# Patient Record
Sex: MALE | Race: WHITE | Employment: OTHER | URBAN - METROPOLITAN AREA
[De-identification: names, ages, dates, MRNs, and addresses within clinical notes are randomized per-mention and may not be internally consistent; named-entity substitution may affect disease eponyms.]

---

## 2017-01-05 RX ORDER — ALFUZOSIN HYDROCHLORIDE 10 MG/1
10 TABLET, EXTENDED RELEASE ORAL DAILY
Qty: 90 TABLET | Refills: 3 | Status: SHIPPED | OUTPATIENT
Start: 2017-01-05 | End: 2019-05-21

## 2017-01-05 NOTE — TELEPHONE ENCOUNTER
[facility] requesting refill for patient for Alfuzosin ER Tablet 10mg Take 1 tablet daily. LOV: 10/12/16, LRF: 1/8/16, Future Appt: 4/12/17. Medication pended for approval. Please advise.

## 2017-03-07 RX ORDER — HYDROCHLOROTHIAZIDE 12.5 MG/1
TABLET ORAL
Qty: 90 TABLET | Refills: 1 | Status: SHIPPED | OUTPATIENT
Start: 2017-03-07 | End: 2017-11-26

## 2017-03-07 RX ORDER — SIMVASTATIN 40 MG
TABLET ORAL
Qty: 90 TABLET | Refills: 1 | Status: SHIPPED | OUTPATIENT
Start: 2017-03-07 | End: 2017-04-12

## 2017-03-07 RX ORDER — LISINOPRIL 40 MG/1
TABLET ORAL
Qty: 90 TABLET | Refills: 1 | Status: SHIPPED | OUTPATIENT
Start: 2017-03-07 | End: 2017-11-26

## 2017-03-07 RX ORDER — NIFEDIPINE 60 MG/1
TABLET, FILM COATED, EXTENDED RELEASE ORAL
Qty: 90 TABLET | Refills: 1 | Status: SHIPPED | OUTPATIENT
Start: 2017-03-07 | End: 2017-11-26

## 2017-03-07 RX ORDER — LABETALOL 100 MG/1
TABLET, FILM COATED ORAL
Qty: 360 TABLET | Refills: 1 | Status: SHIPPED | OUTPATIENT
Start: 2017-03-07 | End: 2017-04-12

## 2017-03-27 RX ORDER — FINASTERIDE 5 MG/1
TABLET, FILM COATED ORAL
Qty: 90 TABLET | Refills: 3 | Status: SHIPPED | OUTPATIENT
Start: 2017-03-27 | End: 2018-05-02

## 2017-04-12 ENCOUNTER — APPOINTMENT (OUTPATIENT)
Dept: LAB | Facility: HOSPITAL | Age: 67
End: 2017-04-12
Attending: UROLOGY
Payer: COMMERCIAL

## 2017-04-12 ENCOUNTER — OFFICE VISIT (OUTPATIENT)
Dept: SURGERY | Facility: CLINIC | Age: 67
End: 2017-04-12

## 2017-04-12 VITALS
WEIGHT: 275 LBS | HEART RATE: 68 BPM | SYSTOLIC BLOOD PRESSURE: 124 MMHG | DIASTOLIC BLOOD PRESSURE: 64 MMHG | BODY MASS INDEX: 35.29 KG/M2 | TEMPERATURE: 98 F | RESPIRATION RATE: 16 BRPM | HEIGHT: 74 IN

## 2017-04-12 DIAGNOSIS — Z12.5 SCREENING PSA (PROSTATE SPECIFIC ANTIGEN): Primary | ICD-10-CM

## 2017-04-12 DIAGNOSIS — Z12.5 SCREENING PSA (PROSTATE SPECIFIC ANTIGEN): ICD-10-CM

## 2017-04-12 PROCEDURE — 36415 COLL VENOUS BLD VENIPUNCTURE: CPT

## 2017-04-12 PROCEDURE — 99214 OFFICE O/P EST MOD 30 MIN: CPT | Performed by: UROLOGY

## 2017-04-12 PROCEDURE — 99212 OFFICE O/P EST SF 10 MIN: CPT | Performed by: UROLOGY

## 2017-04-12 NOTE — PROGRESS NOTES
[address] Patient Status:  Outpatient    1950 MRN [id number]   Location [address] [place] [address] Attending Tej Angel.  [address] Day #  PCP MD Fela Nina is a 74 yea MG Oral Tab TAKE 1 TABLET DAILY Disp: 90 tablet Rfl: 3   HYDROCHLOROTHIAZIDE 12.5 MG Oral Tab TAKE 1 TABLET DAILY Disp: 90 tablet Rfl: 1   LISINOPRIL 40 MG Oral Tab TAKE 1 TABLET DAILY Disp: 90 tablet Rfl: 1   NIFEDIPINE ER 60 MG Oral Tablet 24 Hr TAKE 1 T as well as 5 alpha reductase inhibitors to form of Proscar. Nonetheless despite maximal medical therapy patient continues to have somewhat bothersome symptoms.   Initial consultation dates January 2006 low level microscopic hematuria without gross hematuri then today but states maybe with next visit he would consider the possibility of surgery.   I answered all the patient's questions spent 30 minutes with patient and well over half time in face-to-face discussion of further evaluation and therapy          Sc

## 2017-10-19 ENCOUNTER — APPOINTMENT (OUTPATIENT)
Dept: LAB | Facility: HOSPITAL | Age: 67
End: 2017-10-19
Attending: UROLOGY
Payer: COMMERCIAL

## 2017-10-19 ENCOUNTER — OFFICE VISIT (OUTPATIENT)
Dept: SURGERY | Facility: CLINIC | Age: 67
End: 2017-10-19

## 2017-10-19 VITALS
HEIGHT: 73 IN | DIASTOLIC BLOOD PRESSURE: 76 MMHG | HEART RATE: 64 BPM | BODY MASS INDEX: 34.72 KG/M2 | WEIGHT: 262 LBS | SYSTOLIC BLOOD PRESSURE: 130 MMHG

## 2017-10-19 DIAGNOSIS — N40.1 BPH WITH OBSTRUCTION/LOWER URINARY TRACT SYMPTOMS: Primary | ICD-10-CM

## 2017-10-19 DIAGNOSIS — N13.8 BPH WITH OBSTRUCTION/LOWER URINARY TRACT SYMPTOMS: Primary | ICD-10-CM

## 2017-10-19 DIAGNOSIS — N40.1 BPH WITH OBSTRUCTION/LOWER URINARY TRACT SYMPTOMS: ICD-10-CM

## 2017-10-19 DIAGNOSIS — N13.8 BPH WITH OBSTRUCTION/LOWER URINARY TRACT SYMPTOMS: ICD-10-CM

## 2017-10-19 PROCEDURE — 99212 OFFICE O/P EST SF 10 MIN: CPT | Performed by: UROLOGY

## 2017-10-19 PROCEDURE — 99214 OFFICE O/P EST MOD 30 MIN: CPT | Performed by: UROLOGY

## 2017-10-19 PROCEDURE — 81003 URINALYSIS AUTO W/O SCOPE: CPT

## 2017-10-19 NOTE — PROGRESS NOTES
[address] Patient Status:  Outpatient    1950 MRN [id number]   Location [address] [facility] Attending Faisal Jo.  [address] Day # 0 PCP Shayne Bamberger, MD Dorothey Seller is a 74 ye prescriptions have been marked as taking for the 10/19/17 encounter (Office Visit) with Josue Rodríguez MD.    No Known Allergies   10/19/17  0812   BP: 130/76   Pulse: 64   Weight: 262 lb (118.8 kg)   Height: 6' 1\" (1.854 m)          PHYSICAL EXAM:  Aicha mildly dissatisfied with his urination however several times in the past we have discussed possibility of surgery and he has politely refuses.   Again when patient initially presented we did workup for evaluation of outlet obstruction uroflow bladder ultras

## 2017-11-20 ENCOUNTER — TELEPHONE (OUTPATIENT)
Dept: INTERNAL MEDICINE CLINIC | Facility: CLINIC | Age: 67
End: 2017-11-20

## 2017-11-20 NOTE — TELEPHONE ENCOUNTER
Pt has sent in [employer] message requesting the following orders be added to his chart. Please call back once confirmed.   Fit Colorectal Screening   Colonoscopy,10 Years   Adult Pneumonia Vaccine

## 2017-11-28 RX ORDER — LABETALOL 100 MG/1
TABLET, FILM COATED ORAL
Qty: 360 TABLET | Refills: 0 | Status: SHIPPED | OUTPATIENT
Start: 2017-11-28 | End: 2017-12-22

## 2017-11-28 RX ORDER — NIFEDIPINE 60 MG/1
TABLET, FILM COATED, EXTENDED RELEASE ORAL
Qty: 90 TABLET | Refills: 0 | Status: SHIPPED | OUTPATIENT
Start: 2017-11-28 | End: 2018-02-22

## 2017-11-28 RX ORDER — HYDROCHLOROTHIAZIDE 12.5 MG/1
TABLET ORAL
Qty: 90 TABLET | Refills: 0 | Status: SHIPPED | OUTPATIENT
Start: 2017-11-28 | End: 2018-05-02

## 2017-11-28 RX ORDER — SIMVASTATIN 40 MG
TABLET ORAL
Qty: 90 TABLET | Refills: 0 | Status: SHIPPED | OUTPATIENT
Start: 2017-11-28 | End: 2017-12-22

## 2017-11-28 RX ORDER — LISINOPRIL 40 MG/1
TABLET ORAL
Qty: 90 TABLET | Refills: 0 | Status: SHIPPED | OUTPATIENT
Start: 2017-11-28 | End: 2018-05-02

## 2017-11-28 NOTE — TELEPHONE ENCOUNTER
Hypertensive Medications  Protocol Criteria:  · Appointment scheduled in the past 6 months or in the next 3 months  · BMP or CMP in the past 12 months  · Creatinine result < 2  Recent Outpatient Visits            1 month ago BPH with obstruction/lower urin Criteria:  · Appointment scheduled in the past 12 months or in the next 3 months  · ALT & LDL on file in the past 12 months  · ALT result < 80  · LDL result <130   Recent Outpatient Visits            1 month ago BPH with obstruction/lower urinary tract sym

## 2017-12-01 ENCOUNTER — OFFICE VISIT (OUTPATIENT)
Dept: ORTHOPEDICS CLINIC | Facility: CLINIC | Age: 67
End: 2017-12-01

## 2017-12-01 ENCOUNTER — HOSPITAL ENCOUNTER (OUTPATIENT)
Dept: GENERAL RADIOLOGY | Facility: HOSPITAL | Age: 67
Discharge: HOME | End: 2017-12-01
Attending: ORTHOPAEDIC SURGERY
Payer: COMMERCIAL

## 2017-12-01 DIAGNOSIS — M25.561 ACUTE BILATERAL KNEE PAIN: Primary | ICD-10-CM

## 2017-12-01 DIAGNOSIS — M25.562 PAIN IN BOTH KNEES, UNSPECIFIED CHRONICITY: ICD-10-CM

## 2017-12-01 DIAGNOSIS — M25.562 ACUTE BILATERAL KNEE PAIN: Primary | ICD-10-CM

## 2017-12-01 DIAGNOSIS — M25.561 PAIN IN BOTH KNEES, UNSPECIFIED CHRONICITY: ICD-10-CM

## 2017-12-01 PROCEDURE — 73565 X-RAY EXAM OF KNEES: CPT | Performed by: ORTHOPAEDIC SURGERY

## 2017-12-01 PROCEDURE — 99212 OFFICE O/P EST SF 10 MIN: CPT | Performed by: ORTHOPAEDIC SURGERY

## 2017-12-01 PROCEDURE — 99213 OFFICE O/P EST LOW 20 MIN: CPT | Performed by: ORTHOPAEDIC SURGERY

## 2017-12-01 PROCEDURE — 73560 X-RAY EXAM OF KNEE 1 OR 2: CPT | Performed by: ORTHOPAEDIC SURGERY

## 2017-12-01 NOTE — PROGRESS NOTES
12/1/2017  Molinajoy Hirsch  12/21/1950  74year old   male  Jam Patel MD    HPI:   Patient presents with:  Knee Pain: Bilateral- pt states 1 month ago he twisted knee while mowing the lawn and had pain since.  pt states right knee pain started 2 mo TESTING:  Plain films of the bilateral knees were obtained and reveals adequate placement of bilateral total knee arthroplasties.     ASSESSMENT AND PLAN:   Acute bilateral knee pain  (primary encounter diagnosis)  Pain in both knees, unspecified chronicity

## 2017-12-21 NOTE — H&P
[address] [facility] [address] Gastroenterology                                                                                                  Clinic History and Physical     Pa Renal cell carcinoma ([facility] [address]     clear cell   • Unspecified essential hypertension       Past Surgical History:  No date: CHOLECYSTECTOMY  2007: COLONOSCOPY  2012: KIDNEY SURGERY      Comment: left partial nephrectomy  2005: KNEE REPLACEMENT SURGERY Left hematuria  INTEGUMENT/BREAST:  SKIN:  negative for jaundice   ALLERGIC/IMMUNOLOGIC:  negative for hay fever  ENDOCRINE:  negative for cold intolerance and heat intolerance  MUSCULOSKELETAL:  negative for joint effusion/severe erythema  BEHAVIOR/PSYCH:  neg testing, they want to proceed with colonoscopy.   #Hx Colon Polyp - uncertain PATH  #Hx Renal Cell Carcinoma   #HTN  #HLD    Recommend:  -Schedule colonoscopy w/ Dr. Stephen Baron with IV twilight sedation   -Prep: Suprep - If too expensive, alternative preparatio

## 2017-12-22 ENCOUNTER — TELEPHONE (OUTPATIENT)
Dept: GASTROENTEROLOGY | Facility: CLINIC | Age: 67
End: 2017-12-22

## 2017-12-22 ENCOUNTER — OFFICE VISIT (OUTPATIENT)
Dept: GASTROENTEROLOGY | Facility: CLINIC | Age: 67
End: 2017-12-22

## 2017-12-22 VITALS
HEART RATE: 69 BPM | BODY MASS INDEX: 34.19 KG/M2 | HEIGHT: 72.5 IN | WEIGHT: 255.19 LBS | SYSTOLIC BLOOD PRESSURE: 127 MMHG | DIASTOLIC BLOOD PRESSURE: 74 MMHG

## 2017-12-22 DIAGNOSIS — Z12.11 ENCOUNTER FOR SCREENING COLONOSCOPY: Primary | ICD-10-CM

## 2017-12-22 PROCEDURE — 99212 OFFICE O/P EST SF 10 MIN: CPT | Performed by: PHYSICIAN ASSISTANT

## 2017-12-22 PROCEDURE — 99243 OFF/OP CNSLTJ NEW/EST LOW 30: CPT | Performed by: PHYSICIAN ASSISTANT

## 2017-12-22 NOTE — TELEPHONE ENCOUNTER
Scheduled for:  Colonoscopy [id number]  Provider Name: Dr. Moustapha Tenorio  Date:  1/2/18  Location:  [facility]  Sedation:  IV sedation  Time:  7:30 am, arrival 6:30 am   Prep: Suprep  Meds/Allergies Reconciled?: Syeda Dooley PA-C reviewed  Diagnosis with codes:  Encounter for scr

## 2017-12-22 NOTE — PATIENT INSTRUCTIONS
1. Schedule colonoscopy with Dr. Nick Darnell with IV twilight sedation in January 2018. 2.  bowel prep from pharmacy - I have prescribed Suprep. This is a smaller volume preparation.  If this is too expensive, however, please call my office and we nathaly

## 2017-12-28 ENCOUNTER — OFFICE VISIT (OUTPATIENT)
Dept: INTERNAL MEDICINE CLINIC | Facility: CLINIC | Age: 67
End: 2017-12-28

## 2017-12-28 VITALS
BODY MASS INDEX: 33.88 KG/M2 | WEIGHT: 252.88 LBS | DIASTOLIC BLOOD PRESSURE: 72 MMHG | TEMPERATURE: 98 F | SYSTOLIC BLOOD PRESSURE: 118 MMHG | HEART RATE: 60 BPM | HEIGHT: 72.5 IN | RESPIRATION RATE: 20 BRPM

## 2017-12-28 DIAGNOSIS — E78.5 HYPERLIPIDEMIA, UNSPECIFIED HYPERLIPIDEMIA TYPE: ICD-10-CM

## 2017-12-28 DIAGNOSIS — N40.0 BENIGN PROSTATIC HYPERPLASIA WITHOUT LOWER URINARY TRACT SYMPTOMS: ICD-10-CM

## 2017-12-28 DIAGNOSIS — Z00.00 ROUTINE PHYSICAL EXAMINATION: Primary | ICD-10-CM

## 2017-12-28 DIAGNOSIS — I10 ESSENTIAL HYPERTENSION WITH GOAL BLOOD PRESSURE LESS THAN 140/90: ICD-10-CM

## 2017-12-28 DIAGNOSIS — B35.1 TOENAIL FUNGUS: ICD-10-CM

## 2017-12-28 PROCEDURE — 99397 PER PM REEVAL EST PAT 65+ YR: CPT | Performed by: INTERNAL MEDICINE

## 2017-12-28 NOTE — PROGRESS NOTES
HPI:    Patient ID: Hayder Lira is a 69year old male. HPI  Patient is here for physical exam and follow-up on chronic medical problems as listed below. Last seen in the office in late September 2016.   At that time he had regained a lot of the weigh Cancer Mother    • Breast Cancer Mother    • Alcohol and Other Disorders Associated Father      alcoholism      Smoking status: Never Smoker                                                              Smokeless tobacco: Never Used                      Alc PHYSICAL EXAM:   Physical Exam    Constitutional: He appears well-developed and well-nourished.    HENT:   Right Ear: Tympanic membrane and ear canal normal.   Left Ear: Tympanic membrane and ear canal normal.   Nose: Nose normal.   Mouth/Throat: [address] well controlled. Continue current medication. Patient had a question about substitutes for nifedipine as he was in the Medicare part D. This seems to be an expensive medication. Advised to look into the cost of amlodipine. Follow-up here in 6 months.

## 2017-12-30 ENCOUNTER — LAB ENCOUNTER (OUTPATIENT)
Dept: LAB | Facility: HOSPITAL | Age: 67
End: 2017-12-30
Attending: INTERNAL MEDICINE
Payer: COMMERCIAL

## 2017-12-30 DIAGNOSIS — Z00.00 ROUTINE PHYSICAL EXAMINATION: ICD-10-CM

## 2017-12-30 LAB
ALBUMIN SERPL BCP-MCNC: 4.2 G/DL (ref 3.5–4.8)
ALBUMIN/GLOB SERPL: 1.4 {RATIO} (ref 1–2)
ALP SERPL-CCNC: 71 U/L (ref 32–100)
ALT SERPL-CCNC: 17 U/L (ref 17–63)
ANION GAP SERPL CALC-SCNC: 6 MMOL/L (ref 0–18)
AST SERPL-CCNC: 20 U/L (ref 15–41)
BASOPHILS # BLD: 0 K/UL (ref 0–0.2)
BASOPHILS NFR BLD: 1 %
BILIRUB SERPL-MCNC: 0.7 MG/DL (ref 0.3–1.2)
BUN SERPL-MCNC: 19 MG/DL (ref 8–20)
BUN/CREAT SERPL: 19.2 (ref 10–20)
CALCIUM SERPL-MCNC: 9.1 MG/DL (ref 8.5–10.5)
CHLORIDE SERPL-SCNC: 107 MMOL/L (ref 95–110)
CHOLEST SERPL-MCNC: 115 MG/DL (ref 110–200)
CO2 SERPL-SCNC: 27 MMOL/L (ref 22–32)
CREAT SERPL-MCNC: 0.99 MG/DL (ref 0.5–1.5)
EOSINOPHIL # BLD: 0.2 K/UL (ref 0–0.7)
EOSINOPHIL NFR BLD: 3 %
ERYTHROCYTE [DISTWIDTH] IN BLOOD BY AUTOMATED COUNT: 14.9 % (ref 11–15)
GLOBULIN PLAS-MCNC: 3 G/DL (ref 2.5–3.7)
GLUCOSE SERPL-MCNC: 100 MG/DL (ref 70–99)
HCT VFR BLD AUTO: 40.3 % (ref 41–52)
HDLC SERPL-MCNC: 39 MG/DL
HGB BLD-MCNC: 13.2 G/DL (ref 13.5–17.5)
LDLC SERPL CALC-MCNC: 66 MG/DL (ref 0–99)
LYMPHOCYTES # BLD: 1.3 K/UL (ref 1–4)
LYMPHOCYTES NFR BLD: 22 %
MCH RBC QN AUTO: 28.5 PG (ref 27–32)
MCHC RBC AUTO-ENTMCNC: 32.8 G/DL (ref 32–37)
MCV RBC AUTO: 87.1 FL (ref 80–100)
MONOCYTES # BLD: 0.6 K/UL (ref 0–1)
MONOCYTES NFR BLD: 10 %
NEUTROPHILS # BLD AUTO: 3.8 K/UL (ref 1.8–7.7)
NEUTROPHILS NFR BLD: 64 %
NONHDLC SERPL-MCNC: 76 MG/DL
OSMOLALITY UR CALC.SUM OF ELEC: 292 MOSM/KG (ref 275–295)
PLATELET # BLD AUTO: 208 K/UL (ref 140–400)
PMV BLD AUTO: 8.1 FL (ref 7.4–10.3)
POTASSIUM SERPL-SCNC: 3.8 MMOL/L (ref 3.3–5.1)
PROT SERPL-MCNC: 7.2 G/DL (ref 5.9–8.4)
RBC # BLD AUTO: 4.63 M/UL (ref 4.5–5.9)
SODIUM SERPL-SCNC: 140 MMOL/L (ref 136–144)
TRIGL SERPL-MCNC: 51 MG/DL (ref 1–149)
TSH SERPL-ACNC: 3.31 UIU/ML (ref 0.45–5.33)
WBC # BLD AUTO: 6 K/UL (ref 4–11)

## 2017-12-30 PROCEDURE — 80053 COMPREHEN METABOLIC PANEL: CPT

## 2017-12-30 PROCEDURE — 36415 COLL VENOUS BLD VENIPUNCTURE: CPT

## 2017-12-30 PROCEDURE — 80061 LIPID PANEL: CPT

## 2017-12-30 PROCEDURE — 85025 COMPLETE CBC W/AUTO DIFF WBC: CPT

## 2017-12-30 PROCEDURE — 84443 ASSAY THYROID STIM HORMONE: CPT

## 2018-01-02 ENCOUNTER — SURGERY (OUTPATIENT)
Age: 68
End: 2018-01-02

## 2018-01-02 ENCOUNTER — HOSPITAL ENCOUNTER (OUTPATIENT)
Facility: HOSPITAL | Age: 68
Setting detail: HOSPITAL OUTPATIENT SURGERY
Discharge: HOME | End: 2018-01-02
Attending: INTERNAL MEDICINE | Admitting: INTERNAL MEDICINE
Payer: COMMERCIAL

## 2018-01-02 DIAGNOSIS — Z12.11 ENCOUNTER FOR SCREENING COLONOSCOPY: ICD-10-CM

## 2018-01-02 PROCEDURE — 0DBK8ZX EXCISION OF ASCENDING COLON, VIA NATURAL OR ARTIFICIAL OPENING ENDOSCOPIC, DIAGNOSTIC: ICD-10-PCS | Performed by: INTERNAL MEDICINE

## 2018-01-02 PROCEDURE — 0DBH8ZX EXCISION OF CECUM, VIA NATURAL OR ARTIFICIAL OPENING ENDOSCOPIC, DIAGNOSTIC: ICD-10-PCS | Performed by: INTERNAL MEDICINE

## 2018-01-02 PROCEDURE — 0DBL8ZX EXCISION OF TRANSVERSE COLON, VIA NATURAL OR ARTIFICIAL OPENING ENDOSCOPIC, DIAGNOSTIC: ICD-10-PCS | Performed by: INTERNAL MEDICINE

## 2018-01-02 PROCEDURE — 99152 MOD SED SAME PHYS/QHP 5/>YRS: CPT | Performed by: INTERNAL MEDICINE

## 2018-01-02 PROCEDURE — 45385 COLONOSCOPY W/LESION REMOVAL: CPT | Performed by: INTERNAL MEDICINE

## 2018-01-02 RX ORDER — SODIUM CHLORIDE 0.9 % (FLUSH) 0.9 %
10 SYRINGE (ML) INJECTION AS NEEDED
Status: DISCONTINUED | OUTPATIENT
Start: 2018-01-02 | End: 2018-01-02

## 2018-01-02 RX ORDER — MIDAZOLAM HYDROCHLORIDE 1 MG/ML
INJECTION INTRAMUSCULAR; INTRAVENOUS
Status: DISCONTINUED | OUTPATIENT
Start: 2018-01-02 | End: 2018-01-02

## 2018-01-02 RX ORDER — SODIUM CHLORIDE, SODIUM LACTATE, POTASSIUM CHLORIDE, CALCIUM CHLORIDE 600; 310; 30; 20 MG/100ML; MG/100ML; MG/100ML; MG/100ML
INJECTION, SOLUTION INTRAVENOUS CONTINUOUS
Status: DISCONTINUED | OUTPATIENT
Start: 2018-01-02 | End: 2018-01-02

## 2018-01-02 RX ORDER — MIDAZOLAM HYDROCHLORIDE 1 MG/ML
1 INJECTION INTRAMUSCULAR; INTRAVENOUS EVERY 5 MIN PRN
Status: DISCONTINUED | OUTPATIENT
Start: 2018-01-02 | End: 2018-01-02

## 2018-01-02 NOTE — H&P
History & Physical Examination    Patient Name: Poly Blackwell  MRN: [id number]  CSN: [id number]  YOB: 1950    Diagnosis: Screening colonoscopy examination    Present Illness:     69year old male patient of Dr. Ovi Zhao with history of HLD, mouth daily. Disp: 90 tablet Rfl: 3 Taking   Labetalol HCl 100 MG Oral Tab Take 2 tablets (200 mg total) by mouth 2 (two) times daily.  Disp: 360 tablet Rfl: 1 1/1/2018   simvastatin (ZOCOR) 40 MG Oral Tab TAKE 1 TABLET NIGHTLY Disp: 90 tablet Rfl: 1 Taking [ x ] I have discussed the risks and benefits and alternatives with the patient/family. They understand and agree to proceed with plan of care. [ x ] I have reviewed the History and Physical done within the last 30 days. Any changes noted above.

## 2018-01-02 NOTE — OPERATIVE REPORT
COLONOSCOPY PROCEDURE REPORT     DATE OF PROCEDURE:  1/2/2018     PCP: Robert Wagoner MD     PREOPERATIVE DIAGNOSIS:  Screening colonoscopy examination     POSTOPERATIVE DIAGNOSIS:  See impression. SURGEON:  AURA Tucker:

## 2018-01-03 VITALS
DIASTOLIC BLOOD PRESSURE: 57 MMHG | HEIGHT: 73 IN | OXYGEN SATURATION: 96 % | HEART RATE: 55 BPM | RESPIRATION RATE: 14 BRPM | WEIGHT: 250 LBS | SYSTOLIC BLOOD PRESSURE: 123 MMHG | BODY MASS INDEX: 33.13 KG/M2

## 2018-01-18 ENCOUNTER — TELEPHONE (OUTPATIENT)
Dept: GASTROENTEROLOGY | Facility: CLINIC | Age: 68
End: 2018-01-18

## 2018-01-18 NOTE — TELEPHONE ENCOUNTER
I mailed out colonoscopy results letter to pt  Updated health maintenance  Entered into 3 yr recall  Recall colon in 3 years per.  Colon done 1/02/2018  GI RNs - please print and mail this letter to pt; recall for colonoscopy exam in 3yrs

## 2018-02-20 ENCOUNTER — PATIENT MESSAGE (OUTPATIENT)
Dept: INTERNAL MEDICINE CLINIC | Facility: CLINIC | Age: 68
End: 2018-02-20

## 2018-02-20 NOTE — TELEPHONE ENCOUNTER
Pt called regarding this concern. States has lump or pimple to right cheek , thinks it may be a boil.  + redness and tender to touch. No purulent drainage, afebrile. Does not effect oral movement, vision, or respiratory issues.   Pt is not touching it or

## 2018-02-20 NOTE — TELEPHONE ENCOUNTER
From: Rosetta Deluca  To: Zeeshan Nieto MD  Sent: 2/20/2018 9:47 AM CST  Subject: Non-Urgent Medical Question    I have some sort of \"pimple\" or something on my cheek for about a week now. it is a light brown and hard underneath.  You don't have an marita

## 2018-02-22 ENCOUNTER — OFFICE VISIT (OUTPATIENT)
Dept: INTERNAL MEDICINE CLINIC | Facility: CLINIC | Age: 68
End: 2018-02-22

## 2018-02-22 VITALS
HEIGHT: 72.5 IN | TEMPERATURE: 98 F | HEART RATE: 62 BPM | DIASTOLIC BLOOD PRESSURE: 66 MMHG | BODY MASS INDEX: 34.03 KG/M2 | WEIGHT: 254 LBS | SYSTOLIC BLOOD PRESSURE: 108 MMHG | RESPIRATION RATE: 18 BRPM

## 2018-02-22 DIAGNOSIS — L72.9 INFECTED CYST OF SKIN: Primary | ICD-10-CM

## 2018-02-22 DIAGNOSIS — L08.9 INFECTED CYST OF SKIN: Primary | ICD-10-CM

## 2018-02-22 DIAGNOSIS — I10 ESSENTIAL HYPERTENSION WITH GOAL BLOOD PRESSURE LESS THAN 140/90: ICD-10-CM

## 2018-02-22 PROCEDURE — 99213 OFFICE O/P EST LOW 20 MIN: CPT | Performed by: INTERNAL MEDICINE

## 2018-02-22 PROCEDURE — G0463 HOSPITAL OUTPT CLINIC VISIT: HCPCS | Performed by: INTERNAL MEDICINE

## 2018-02-22 RX ORDER — AMLODIPINE BESYLATE 5 MG/1
5 TABLET ORAL DAILY
Qty: 90 TABLET | Refills: 1 | Status: SHIPPED | OUTPATIENT
Start: 2018-02-22 | End: 2018-12-10

## 2018-02-22 RX ORDER — CEFADROXIL 500 MG/1
500 CAPSULE ORAL 2 TIMES DAILY
Qty: 10 CAPSULE | Refills: 0 | Status: SHIPPED | OUTPATIENT
Start: 2018-02-22 | End: 2018-02-27

## 2018-02-22 RX ORDER — SIMVASTATIN 40 MG
TABLET ORAL
Qty: 90 TABLET | Refills: 1 | Status: SHIPPED | OUTPATIENT
Start: 2018-02-22 | End: 2018-08-03

## 2018-02-22 NOTE — PROGRESS NOTES
HPI:    Patient ID: Doyle Lambert is a 69year old male. HPI  Patient is here with concerns about bump on his right cheek of his face. There is a red bump there. Is been there for 1 week. No pain, drainage, itching. He has not treated it.   No histo was drained. It was cleaned with alcohol swab. Using a #11 scalpel a tiny nick was made. Small amount of pus and blood was obtained. Patient tolerated the procedure well. Bleeding stopped with pressure applied. Placed on [address] [place] [address] for 5 days.   Call if

## 2018-05-02 DIAGNOSIS — I10 ESSENTIAL HYPERTENSION: Primary | ICD-10-CM

## 2018-05-03 RX ORDER — LISINOPRIL 40 MG/1
40 TABLET ORAL
Qty: 90 TABLET | Refills: 0 | Status: SHIPPED
Start: 2018-05-03 | End: 2018-08-03

## 2018-05-03 RX ORDER — LABETALOL 100 MG/1
200 TABLET, FILM COATED ORAL 2 TIMES DAILY
Qty: 360 TABLET | Refills: 0 | Status: SHIPPED
Start: 2018-05-03 | End: 2018-10-08

## 2018-05-03 RX ORDER — HYDROCHLOROTHIAZIDE 12.5 MG/1
12.5 TABLET ORAL
Qty: 90 TABLET | Refills: 0 | Status: SHIPPED
Start: 2018-05-03 | End: 2018-10-03

## 2018-05-03 RX ORDER — FINASTERIDE 5 MG/1
5 TABLET, FILM COATED ORAL
Qty: 90 TABLET | Refills: 0 | Status: SHIPPED
Start: 2018-05-03 | End: 2018-08-03

## 2018-05-03 NOTE — TELEPHONE ENCOUNTER
Hypertensive Medications  Protocol Criteria:  · Appointment scheduled in the past 6 months or in the next 3 months  · BMP or CMP in the past 12 months  · Creatinine result < 2  Recent Outpatient Visits            2 months ago Infected cyst of skin    Elu

## 2018-05-03 NOTE — TELEPHONE ENCOUNTER
From: Jannet Ortiz  Sent: 5/2/2018 10:05 AM CDT  Subject: Medication Renewal Request    Jannet Ortiz would like a refill of the following medications:     Labetalol HCl 100 MG Oral Tab Oli Shaw MD]     FINASTERIDE 5 MG Oral Tab Oli Tinajeros

## 2018-05-03 NOTE — TELEPHONE ENCOUNTER
Refill Protocol Appointment Criteria  · Appointment scheduled in the past 12 months or in the next 3 months  Recent Outpatient Visits            2 months ago Infected cyst of skin    Ella Dorsey MD    Office Visit

## 2018-05-23 ENCOUNTER — PATIENT MESSAGE (OUTPATIENT)
Dept: GASTROENTEROLOGY | Facility: CLINIC | Age: 68
End: 2018-05-23

## 2018-05-23 NOTE — TELEPHONE ENCOUNTER
From: Jaime Hutton  To: Reese [place]  Sent: 5/23/2018 10:36 AM CDT  Subject: Test Results Question    Hi Velia     I had a colonoscopy with Dr Ross Mann on January 2 and I just realized I don't remember seeing any results.  I just went to [facility] and co

## 2018-05-23 NOTE — TELEPHONE ENCOUNTER
Patient contacted, verified and letter read to patient. Patient also requested copy be mailed to him. Address verified and letter mailed. Patient verbalized understanding.     Dear Mr. Marisela Gillis  Your recent colonoscopy exam at [facility]

## 2018-05-28 ENCOUNTER — APPOINTMENT (OUTPATIENT)
Dept: GENERAL RADIOLOGY | Age: 68
End: 2018-05-28
Attending: EMERGENCY MEDICINE
Payer: MEDICARE

## 2018-05-28 ENCOUNTER — HOSPITAL ENCOUNTER (OUTPATIENT)
Age: 68
Discharge: HOME | End: 2018-05-28
Attending: EMERGENCY MEDICINE
Payer: MEDICARE

## 2018-05-28 VITALS
DIASTOLIC BLOOD PRESSURE: 73 MMHG | OXYGEN SATURATION: 100 % | BODY MASS INDEX: 33.13 KG/M2 | HEIGHT: 73 IN | TEMPERATURE: 98 F | HEART RATE: 71 BPM | WEIGHT: 250 LBS | SYSTOLIC BLOOD PRESSURE: 131 MMHG | RESPIRATION RATE: 18 BRPM

## 2018-05-28 DIAGNOSIS — M25.552 ACUTE HIP PAIN, LEFT: Primary | ICD-10-CM

## 2018-05-28 DIAGNOSIS — M54.32 SCIATICA OF LEFT SIDE: ICD-10-CM

## 2018-05-28 PROCEDURE — 73552 X-RAY EXAM OF FEMUR 2/>: CPT | Performed by: EMERGENCY MEDICINE

## 2018-05-28 PROCEDURE — 99213 OFFICE O/P EST LOW 20 MIN: CPT

## 2018-05-28 PROCEDURE — 72170 X-RAY EXAM OF PELVIS: CPT | Performed by: EMERGENCY MEDICINE

## 2018-05-28 PROCEDURE — 99214 OFFICE O/P EST MOD 30 MIN: CPT

## 2018-05-28 RX ORDER — PREDNISONE 20 MG/1
40 TABLET ORAL DAILY
Qty: 10 TABLET | Refills: 0 | Status: SHIPPED | OUTPATIENT
Start: 2018-05-28 | End: 2018-06-02

## 2018-05-28 NOTE — ED PROVIDER NOTES
Patient Seen in: [address]    History   Patient presents with:  Leg Pain    Stated Complaint: left leg pain     HPI    The patient is a 17-year-old male with past history of hypertension, hyperlipidemia, renal cell carc systems reviewed and negative except as noted above.     Physical Exam   ED Triage Vitals [05/28/18 1449]  BP: 131/73  Pulse: 71  Resp: 18  Temp: 98 °F (36.7 °C)  Temp src: Oral  SpO2: 100 %  O2 Device: None (Room air)    Current:/73   Pulse 71   Temp diagnosis)  Sciatica of left side    Disposition:  Discharge  5/28/2018  3:46 pm    Follow-up:  Maurizio Carpio MD  [address]  [phone]    In 3 days  If symptoms worsen        Medications Prescribed:  Discharge Medica

## 2018-05-28 NOTE — ED INITIAL ASSESSMENT (HPI)
Patient states having pain going down left upper leg, states pain increases with laying down. Patient states he first noticed the pain to left upper leg when he was walking around today.

## 2018-06-05 ENCOUNTER — HOSPITAL ENCOUNTER (OUTPATIENT)
Dept: GENERAL RADIOLOGY | Facility: HOSPITAL | Age: 68
Discharge: HOME | End: 2018-06-05
Attending: ORTHOPAEDIC SURGERY | Admitting: ORTHOPAEDIC SURGERY
Payer: MEDICARE

## 2018-06-05 ENCOUNTER — OFFICE VISIT (OUTPATIENT)
Dept: ORTHOPEDICS CLINIC | Facility: CLINIC | Age: 68
End: 2018-06-05

## 2018-06-05 DIAGNOSIS — M51.36 DDD (DEGENERATIVE DISC DISEASE), LUMBAR: Primary | ICD-10-CM

## 2018-06-05 DIAGNOSIS — M79.605 LEFT LEG PAIN: ICD-10-CM

## 2018-06-05 PROCEDURE — 72100 X-RAY EXAM L-S SPINE 2/3 VWS: CPT | Performed by: ORTHOPAEDIC SURGERY

## 2018-06-05 PROCEDURE — 99214 OFFICE O/P EST MOD 30 MIN: CPT | Performed by: ORTHOPAEDIC SURGERY

## 2018-06-05 PROCEDURE — G0463 HOSPITAL OUTPT CLINIC VISIT: HCPCS | Performed by: ORTHOPAEDIC SURGERY

## 2018-06-05 NOTE — PROGRESS NOTES
6/5/2018  Courtney Davies  12/21/1950  69year old   male  Lalita Mike MD    HPI:   Patient presents with:  Leg Pain: left upper leg pain- pt states pain started 3 wks ago. pt denies any injury.  pt states he went to  on 5/28 and had pelvis and fem disease. ASSESSMENT AND PLAN:   Ddd (degenerative disc disease), lumbar  (primary encounter diagnosis)  Left leg pain    This is a pleasant 56-year-old male with degenerative disc disease of the lumbar spine.   Patient was told that he would benefit from

## 2018-06-06 ENCOUNTER — APPOINTMENT (OUTPATIENT)
Dept: LAB | Facility: HOSPITAL | Age: 68
End: 2018-06-06
Attending: UROLOGY
Payer: MEDICARE

## 2018-06-06 ENCOUNTER — OFFICE VISIT (OUTPATIENT)
Dept: SURGERY | Facility: CLINIC | Age: 68
End: 2018-06-06

## 2018-06-06 VITALS
SYSTOLIC BLOOD PRESSURE: 140 MMHG | TEMPERATURE: 98 F | DIASTOLIC BLOOD PRESSURE: 70 MMHG | HEIGHT: 73 IN | WEIGHT: 253 LBS | BODY MASS INDEX: 33.53 KG/M2

## 2018-06-06 DIAGNOSIS — Z12.5 SCREENING PSA (PROSTATE SPECIFIC ANTIGEN): ICD-10-CM

## 2018-06-06 DIAGNOSIS — C64.2 MALIGNANT NEOPLASM OF LEFT KIDNEY (HCC): ICD-10-CM

## 2018-06-06 DIAGNOSIS — Z12.5 SCREENING PSA (PROSTATE SPECIFIC ANTIGEN): Primary | ICD-10-CM

## 2018-06-06 PROCEDURE — 36415 COLL VENOUS BLD VENIPUNCTURE: CPT

## 2018-06-06 PROCEDURE — 99214 OFFICE O/P EST MOD 30 MIN: CPT | Performed by: UROLOGY

## 2018-06-06 PROCEDURE — G0463 HOSPITAL OUTPT CLINIC VISIT: HCPCS | Performed by: UROLOGY

## 2018-06-06 NOTE — PROGRESS NOTES
[address] Patient Status:  Outpatient    1950 MRN [id number]   Location [address] [facility] Attending Jenna Burch.  [address] Day # 0 PCP MD Linden Arteaga is a 69 ye by mouth daily. Disp: 90 tablet Rfl: 3   aspirin 81 MG Oral Tab Take 1 tablet by mouth daily. Disp: 1 tablet Rfl: 0       Labetalol HCl 100 MG Oral Tab Take 2 tablets (200 mg total) by mouth 2 (two) times daily.  Disp: 360 tablet Rfl: 0   finasteride 5 MG O renal ultrasound for screening of his history of left renal mass partial nephrectomy      ASSESSMENT AND PLAN:  Case summary: Patient is a 18-year-old white male being followed in routine six-month basis for BPH with obstruction that includes maximal medic for another ultrasound the present time. Patient will also sit for PSA blood test I do think patient's visits can now stretch out to once a year.   I answered all the patient's questions spent 30 minutes with patient and well over half time in face-to-face

## 2018-06-08 ENCOUNTER — OFFICE VISIT (OUTPATIENT)
Dept: PHYSICAL THERAPY | Facility: HOSPITAL | Age: 68
End: 2018-06-08
Attending: ORTHOPAEDIC SURGERY
Payer: MEDICARE

## 2018-06-08 DIAGNOSIS — M79.605 LEFT LEG PAIN: ICD-10-CM

## 2018-06-08 DIAGNOSIS — M51.36 DDD (DEGENERATIVE DISC DISEASE), LUMBAR: ICD-10-CM

## 2018-06-08 PROCEDURE — 97162 PT EVAL MOD COMPLEX 30 MIN: CPT

## 2018-06-08 PROCEDURE — 97110 THERAPEUTIC EXERCISES: CPT

## 2018-06-08 NOTE — PROGRESS NOTES
LUMBAR SPINE EVALUATION:   Referring Physician: Dr. Charlette Solorio DX:  DDD (degenerative disc disease), lumbar (M51.36)  Left leg pain (M79.605)   Date of Service: 6/8/2018       PATIENT SUMMARY   Dyan Painting is a 69year old y/o male who prese pain and return to prior level of function.      OBJECTIVE:   Observation/Posture: slouched sitting, right lumbar shift, right shoulder elevated        Gait: no gait impairments  Neurological Screen (Motor deficit, Sensory Deficit, Reflexes, Dural Sign): WN Neuromuscular Re-education; Therapeutic Activity; Ultrasound; Electrical Stim; Traction; Patient education: Home exercise program instruction    Education or treatment limitation: None  Rehab Potential:excellent     FOTO: 58/100  Current status G Code:  Ini

## 2018-06-11 ENCOUNTER — OFFICE VISIT (OUTPATIENT)
Dept: PHYSICAL THERAPY | Facility: HOSPITAL | Age: 68
End: 2018-06-11
Attending: ORTHOPAEDIC SURGERY
Payer: MEDICARE

## 2018-06-11 PROCEDURE — 97110 THERAPEUTIC EXERCISES: CPT

## 2018-06-11 NOTE — PROGRESS NOTES
Associated DX:  DDD (degenerative disc disease), lumbar (M51.36)  Left leg pain (M79.605)    Insurance:Medicare   Next MD visit: not scheduled  Fall Risk: standard         Precautions: n/a           Medication Changes since last visit?: No  Subjective: Razia Jimenez

## 2018-06-12 ENCOUNTER — HOSPITAL ENCOUNTER (OUTPATIENT)
Dept: ULTRASOUND IMAGING | Age: 68
Discharge: HOME | End: 2018-06-12
Attending: UROLOGY
Payer: MEDICARE

## 2018-06-12 DIAGNOSIS — C64.2 MALIGNANT NEOPLASM OF LEFT KIDNEY (HCC): ICD-10-CM

## 2018-06-12 PROCEDURE — 76775 US EXAM ABDO BACK WALL LIM: CPT | Performed by: UROLOGY

## 2018-06-13 ENCOUNTER — APPOINTMENT (OUTPATIENT)
Dept: PHYSICAL THERAPY | Facility: HOSPITAL | Age: 68
End: 2018-06-13
Attending: ORTHOPAEDIC SURGERY
Payer: MEDICARE

## 2018-06-15 ENCOUNTER — OFFICE VISIT (OUTPATIENT)
Dept: PHYSICAL THERAPY | Facility: HOSPITAL | Age: 68
End: 2018-06-15
Attending: ORTHOPAEDIC SURGERY
Payer: MEDICARE

## 2018-06-15 DIAGNOSIS — M51.36 DDD (DEGENERATIVE DISC DISEASE), LUMBAR: ICD-10-CM

## 2018-06-15 DIAGNOSIS — M79.605 LEFT LEG PAIN: ICD-10-CM

## 2018-06-15 PROCEDURE — 97110 THERAPEUTIC EXERCISES: CPT

## 2018-06-15 NOTE — PROGRESS NOTES
Associated DX:  DDD (degenerative disc disease), lumbar (M51.36)  Left leg pain (M79.605)    Insurance:Medicare   Next MD visit: not scheduled  Fall Risk: standard         Precautions: n/a           Medication Changes since last visit?: No  Subjective: Anni Gonzalez

## 2018-06-19 ENCOUNTER — OFFICE VISIT (OUTPATIENT)
Dept: PHYSICAL THERAPY | Facility: HOSPITAL | Age: 68
End: 2018-06-19
Attending: ORTHOPAEDIC SURGERY
Payer: MEDICARE

## 2018-06-19 ENCOUNTER — OFFICE VISIT (OUTPATIENT)
Dept: PODIATRY CLINIC | Facility: CLINIC | Age: 68
End: 2018-06-19

## 2018-06-19 DIAGNOSIS — M79.605 LEFT LEG PAIN: ICD-10-CM

## 2018-06-19 DIAGNOSIS — M20.41 HAMMER TOE OF RIGHT FOOT: ICD-10-CM

## 2018-06-19 DIAGNOSIS — M51.36 DDD (DEGENERATIVE DISC DISEASE), LUMBAR: ICD-10-CM

## 2018-06-19 DIAGNOSIS — B35.1 ONYCHOMYCOSIS: Primary | ICD-10-CM

## 2018-06-19 PROCEDURE — G0463 HOSPITAL OUTPT CLINIC VISIT: HCPCS | Performed by: PODIATRIST

## 2018-06-19 PROCEDURE — 99203 OFFICE O/P NEW LOW 30 MIN: CPT | Performed by: PODIATRIST

## 2018-06-19 PROCEDURE — 97110 THERAPEUTIC EXERCISES: CPT

## 2018-06-19 NOTE — PROGRESS NOTES
Associated DX:  DDD (degenerative disc disease), lumbar (M51.36)  Left leg pain (M79.605)    Insurance:Medicare   Next MD visit: not scheduled  Fall Risk: standard         Precautions: n/a           Medication Changes since last visit?: No  Subjective: \"I initiate extension exercises for lower back, pt respond well no increased pain or discomfort. Patient c/o slight left leg tired but no inc pain and prone with B knee flex pt make slide snipping noise left posterior knee and per pt it's slight discomfort.  E

## 2018-06-19 NOTE — PROGRESS NOTES
HPI:    Patient ID: Santo Collado is a 69year old male. HPI  This 58-year-old male presents as a new patient to me and states that he is referred by [facility]. He has 3 concerns. The first is in reference to nail changes on both feet.   The second i culture to assist in making the diagnosis. The cord on the fifth left toe is over the proximal interphalangeal joint. Sharply debridement demonstrates no open or draining.   The toe is semirigid in its position and therefore based on shoe wear this will b

## 2018-06-21 DIAGNOSIS — I10 ESSENTIAL HYPERTENSION: ICD-10-CM

## 2018-06-21 RX ORDER — HYDROCHLOROTHIAZIDE 12.5 MG/1
TABLET ORAL
Qty: 90 TABLET | OUTPATIENT
Start: 2018-06-21

## 2018-06-21 RX ORDER — LABETALOL 100 MG/1
TABLET, FILM COATED ORAL
Qty: 360 TABLET | OUTPATIENT
Start: 2018-06-21

## 2018-06-21 RX ORDER — LISINOPRIL 40 MG/1
TABLET ORAL
Qty: 90 TABLET | OUTPATIENT
Start: 2018-06-21

## 2018-06-21 RX ORDER — FINASTERIDE 5 MG/1
TABLET, FILM COATED ORAL
Qty: 90 TABLET | OUTPATIENT
Start: 2018-06-21

## 2018-06-22 ENCOUNTER — OFFICE VISIT (OUTPATIENT)
Dept: PHYSICAL THERAPY | Facility: HOSPITAL | Age: 68
End: 2018-06-22
Attending: ORTHOPAEDIC SURGERY
Payer: MEDICARE

## 2018-06-22 DIAGNOSIS — M51.36 DDD (DEGENERATIVE DISC DISEASE), LUMBAR: ICD-10-CM

## 2018-06-22 DIAGNOSIS — M79.605 LEFT LEG PAIN: ICD-10-CM

## 2018-06-22 PROCEDURE — 97110 THERAPEUTIC EXERCISES: CPT

## 2018-06-22 NOTE — PROGRESS NOTES
Associated DX:  DDD (degenerative disc disease), lumbar (M51.36)  Left leg pain (M79.605)    Insurance:Medicare   Next MD visit: 7/3/18  Fall Risk: standard         Precautions: n/a           Medication Changes since last visit?: No  Subjective: Patient st day          Assessment: Patient had no significant reduction of pain with side glide today so repeated flexion was assessed. Extension produced leg pain. Patient had decrease in pain with repeated flexion and patient will trial this at home. Goals:   To

## 2018-06-25 ENCOUNTER — OFFICE VISIT (OUTPATIENT)
Dept: PHYSICAL THERAPY | Facility: HOSPITAL | Age: 68
End: 2018-06-25
Attending: ORTHOPAEDIC SURGERY
Payer: MEDICARE

## 2018-06-25 DIAGNOSIS — M79.605 LEFT LEG PAIN: ICD-10-CM

## 2018-06-25 DIAGNOSIS — M51.36 DDD (DEGENERATIVE DISC DISEASE), LUMBAR: ICD-10-CM

## 2018-06-25 PROCEDURE — 97110 THERAPEUTIC EXERCISES: CPT

## 2018-06-25 NOTE — PROGRESS NOTES
Associated DX:  DDD (degenerative disc disease), lumbar (M51.36)  Left leg pain (M79.605)    Insurance:Medicare   Next MD visit: 7/3/18  Fall Risk: standard         Precautions: n/a           Medication Changes since last visit?: No  Subjective: Patient st previous however standing straight instead of flexion bias Not today Repeated flexion in seated 5 times a day Repeated flexion in seated and standing x 10 (4 times a day)          Assessment: Patient responded very well to repeated flexion and symptoms imp

## 2018-06-29 ENCOUNTER — OFFICE VISIT (OUTPATIENT)
Dept: PHYSICAL THERAPY | Facility: HOSPITAL | Age: 68
End: 2018-06-29
Attending: ORTHOPAEDIC SURGERY
Payer: MEDICARE

## 2018-06-29 DIAGNOSIS — M51.36 DDD (DEGENERATIVE DISC DISEASE), LUMBAR: ICD-10-CM

## 2018-06-29 DIAGNOSIS — M79.605 LEFT LEG PAIN: ICD-10-CM

## 2018-06-29 PROCEDURE — 97110 THERAPEUTIC EXERCISES: CPT

## 2018-07-02 ENCOUNTER — APPOINTMENT (OUTPATIENT)
Dept: PHYSICAL THERAPY | Facility: HOSPITAL | Age: 68
End: 2018-07-02
Attending: ORTHOPAEDIC SURGERY
Payer: MEDICARE

## 2018-07-03 ENCOUNTER — OFFICE VISIT (OUTPATIENT)
Dept: ORTHOPEDICS CLINIC | Facility: CLINIC | Age: 68
End: 2018-07-03

## 2018-07-03 DIAGNOSIS — M51.36 DDD (DEGENERATIVE DISC DISEASE), LUMBAR: Primary | ICD-10-CM

## 2018-07-03 PROCEDURE — G0463 HOSPITAL OUTPT CLINIC VISIT: HCPCS | Performed by: ORTHOPAEDIC SURGERY

## 2018-07-03 PROCEDURE — 99213 OFFICE O/P EST LOW 20 MIN: CPT | Performed by: ORTHOPAEDIC SURGERY

## 2018-07-03 NOTE — PROGRESS NOTES
This is a pleasant 40-year-old male with previous diagnosis of degenerative disc disease of the lumbar spine. The patient performed a course of physical therapy reports he feels much better than he did previously.   He continues to have intermittent anteri

## 2018-07-09 ENCOUNTER — OFFICE VISIT (OUTPATIENT)
Dept: PHYSICAL THERAPY | Facility: HOSPITAL | Age: 68
End: 2018-07-09
Attending: ORTHOPAEDIC SURGERY
Payer: MEDICARE

## 2018-07-09 PROCEDURE — 97110 THERAPEUTIC EXERCISES: CPT

## 2018-07-09 RX ORDER — SIMVASTATIN 40 MG
TABLET ORAL
Qty: 90 TABLET | OUTPATIENT
Start: 2018-07-09

## 2018-07-09 NOTE — PROGRESS NOTES
Discharge Summary    Associated DX:  DDD (degenerative disc disease), lumbar (M51.36)  Left leg pain (M79.605)    Insurance:Medicare   Next MD visit: 7/3/18  Fall Risk: standard         Precautions: n/a           Medication Changes since last visit?: No  S anterior thigh    RFIL 2 x 10 - slightly better    RFISeated x 10 - no pain during, better after     RFISeated  3 x 10    Inclined walking x 6 minutes 2.0mph (attended for symptom monitoring)    Standing lumbar flexion 2 x 10         Inclined walking x 6 m [phone]     Sincerely,  Electronically signed by therapist: Tobias Alejandra, PT, DPT, Cert. MDT     Physician's certification required: Yes  I certify the need for these services furnished under this plan of treatment and while under my care.     X______

## 2018-07-20 ENCOUNTER — APPOINTMENT (OUTPATIENT)
Dept: PHYSICAL THERAPY | Facility: HOSPITAL | Age: 68
End: 2018-07-20
Attending: ORTHOPAEDIC SURGERY
Payer: MEDICARE

## 2018-07-24 ENCOUNTER — OFFICE VISIT (OUTPATIENT)
Dept: PODIATRY CLINIC | Facility: CLINIC | Age: 68
End: 2018-07-24
Payer: MEDICARE

## 2018-07-24 DIAGNOSIS — B35.1 ONYCHOMYCOSIS: Primary | ICD-10-CM

## 2018-07-24 PROCEDURE — G0463 HOSPITAL OUTPT CLINIC VISIT: HCPCS | Performed by: PODIATRIST

## 2018-07-24 PROCEDURE — 99212 OFFICE O/P EST SF 10 MIN: CPT | Performed by: PODIATRIST

## 2018-07-24 RX ORDER — TERBINAFINE HYDROCHLORIDE 250 MG/1
250 TABLET ORAL DAILY
Qty: 90 TABLET | Refills: 0 | Status: SHIPPED | OUTPATIENT
Start: 2018-07-24 | End: 2019-07-03

## 2018-07-24 NOTE — PROGRESS NOTES
HPI:    Patient ID: Silvio Hernandez is a 69year old male. HPI  56-year-old male presents for follow-up and further considerations of treatment associated with fungus toenails.   After review of the culture, evaluation of the clinical nail changes and his Sig: Take 1 tablet (250 mg total) by mouth daily.            Imaging & Referrals:  None       MT#[id number]

## 2018-08-03 ENCOUNTER — PATIENT MESSAGE (OUTPATIENT)
Dept: INTERNAL MEDICINE CLINIC | Facility: CLINIC | Age: 68
End: 2018-08-03

## 2018-08-03 DIAGNOSIS — I10 ESSENTIAL HYPERTENSION: ICD-10-CM

## 2018-08-03 NOTE — TELEPHONE ENCOUNTER
From: Liz Ward  Sent: 8/3/2018 3:37 PM CDT  Subject: Medication Renewal Request    Liz Ward would like a refill of the following medications:     simvastatin 40 MG Oral Tab Shaunna Ruiz MD]     finasteride 5 MG Oral Tab Shaunna Ruiz MD

## 2018-08-04 RX ORDER — LISINOPRIL 40 MG/1
40 TABLET ORAL
Qty: 90 TABLET | Refills: 0 | Status: SHIPPED | OUTPATIENT
Start: 2018-08-04 | End: 2018-10-03

## 2018-08-04 NOTE — TELEPHONE ENCOUNTER
Please review pended refill requests as does not fall under a protocol and simvastatin shows interaction with amlodipine.     Other med(s) refilled per [facility] protocol:    Hypertensive Medications  Protocol Criteria:  · Appointment scheduled in the

## 2018-08-04 NOTE — TELEPHONE ENCOUNTER
From: Laura De Santiago  To: Saroj Perkins MD  Sent: 8/3/2018 3:44 PM CDT  Subject: Non-Urgent Medical Question    I am finally ready to start taking the Amlodipine in place of the NIfedipine.  I believe you wanted me to come in to see Bolivar paz

## 2018-08-06 RX ORDER — FINASTERIDE 5 MG/1
5 TABLET, FILM COATED ORAL
Qty: 90 TABLET | Refills: 1 | Status: SHIPPED | OUTPATIENT
Start: 2018-08-06 | End: 2018-12-10

## 2018-08-06 RX ORDER — SIMVASTATIN 40 MG
TABLET ORAL
Qty: 90 TABLET | Refills: 1 | Status: SHIPPED | OUTPATIENT
Start: 2018-08-06 | End: 2018-12-10

## 2018-09-23 DIAGNOSIS — I10 ESSENTIAL HYPERTENSION: ICD-10-CM

## 2018-09-24 RX ORDER — LISINOPRIL 40 MG/1
TABLET ORAL
Qty: 90 TABLET | Refills: 0 | OUTPATIENT
Start: 2018-09-24

## 2018-10-03 ENCOUNTER — OFFICE VISIT (OUTPATIENT)
Dept: INTERNAL MEDICINE CLINIC | Facility: CLINIC | Age: 68
End: 2018-10-03
Payer: MEDICARE

## 2018-10-03 VITALS
HEART RATE: 61 BPM | SYSTOLIC BLOOD PRESSURE: 114 MMHG | BODY MASS INDEX: 34.47 KG/M2 | HEIGHT: 73 IN | WEIGHT: 260.13 LBS | DIASTOLIC BLOOD PRESSURE: 70 MMHG

## 2018-10-03 DIAGNOSIS — Z23 NEED FOR VACCINATION: Primary | ICD-10-CM

## 2018-10-03 DIAGNOSIS — I10 ESSENTIAL HYPERTENSION: ICD-10-CM

## 2018-10-03 PROCEDURE — 90653 IIV ADJUVANT VACCINE IM: CPT | Performed by: PHYSICIAN ASSISTANT

## 2018-10-03 PROCEDURE — G0463 HOSPITAL OUTPT CLINIC VISIT: HCPCS | Performed by: PHYSICIAN ASSISTANT

## 2018-10-03 PROCEDURE — G0008 ADMIN INFLUENZA VIRUS VAC: HCPCS | Performed by: PHYSICIAN ASSISTANT

## 2018-10-03 PROCEDURE — 99213 OFFICE O/P EST LOW 20 MIN: CPT | Performed by: PHYSICIAN ASSISTANT

## 2018-10-03 RX ORDER — LISINOPRIL 40 MG/1
40 TABLET ORAL
Qty: 90 TABLET | Refills: 1 | Status: SHIPPED | OUTPATIENT
Start: 2018-10-03 | End: 2019-05-07

## 2018-10-03 RX ORDER — HYDROCHLOROTHIAZIDE 12.5 MG/1
12.5 TABLET ORAL
Qty: 90 TABLET | Refills: 1 | Status: SHIPPED | OUTPATIENT
Start: 2018-10-03 | End: 2019-02-06

## 2018-10-03 NOTE — PROGRESS NOTES
HPI:    Patient ID: Meera Couch is a 69year old male. HPI   Patient presents today for follow-up of hypertension. He was last seen in the office by PCP in February.   At that time we switched nifedipine to amlodipine due to insurance issues  That ti tablet by mouth daily. Disp: 1 tablet Rfl: 0     Allergies:No Known Allergies   PHYSICAL EXAM:   Physical Exam   Nursing note and vitals reviewed. Constitutional: He appears well-developed and well-nourished. HENT:   Head: Normocephalic and atraumatic.

## 2018-10-08 ENCOUNTER — PATIENT MESSAGE (OUTPATIENT)
Dept: INTERNAL MEDICINE CLINIC | Facility: CLINIC | Age: 68
End: 2018-10-08

## 2018-10-08 DIAGNOSIS — I10 ESSENTIAL HYPERTENSION: ICD-10-CM

## 2018-10-09 NOTE — TELEPHONE ENCOUNTER
From: Courtney Davies  To: Peyton Baez PA-C  Sent: 10/8/2018 9:39 PM CDT  Subject: Prescription Question    just noticed, I need Labetalol not lisinopril. Did I give you the wrong med to refill?  I put in a prescription request for Labetalol separately j

## 2018-10-09 NOTE — TELEPHONE ENCOUNTER
From: Shyanne Ramirez  To: Alyssa Gray PA-C  Sent: 10/8/2018 9:27 PM CDT  Subject: Prescription Question    I saw you last week and requested refills for hydrocholorothiazide and Lisinopril. I received only the hydro from my prescription service.  Want t

## 2018-10-10 RX ORDER — LABETALOL 100 MG/1
200 TABLET, FILM COATED ORAL 2 TIMES DAILY
Qty: 360 TABLET | Refills: 0 | Status: SHIPPED | OUTPATIENT
Start: 2018-10-10 | End: 2018-11-28

## 2018-10-10 NOTE — TELEPHONE ENCOUNTER
Requested Prescriptions     Signed Prescriptions Disp Refills   • Labetalol HCl 100 MG Oral Tab 360 tablet 0     Sig: Take 2 tablets (200 mg total) by mouth 2 (two) times daily.      Authorizing Provider: Dulce Webster     Ordering User: Juanito Lopez  12/30/2017    CO2 27 12/30/2017    GLOBULIN 3.0 12/30/2017    AGRATIO 1.3 09/29/2016    ANIONGAP 6 12/30/2017    OSMOCALC 292 12/30/2017

## 2018-10-22 ENCOUNTER — OFFICE VISIT (OUTPATIENT)
Dept: PODIATRY CLINIC | Facility: CLINIC | Age: 68
End: 2018-10-22
Payer: MEDICARE

## 2018-10-22 DIAGNOSIS — B35.1 ONYCHOMYCOSIS: Primary | ICD-10-CM

## 2018-10-22 PROCEDURE — 99212 OFFICE O/P EST SF 10 MIN: CPT | Performed by: PODIATRIST

## 2018-10-22 PROCEDURE — G0463 HOSPITAL OUTPT CLINIC VISIT: HCPCS | Performed by: PODIATRIST

## 2018-10-22 NOTE — PROGRESS NOTES
HPI:    Patient ID: Courtney Davies is a 69year old male. 68-year-old male presents for fungus nail evaluation. He is now completed 2 months of the oral antifungal medications.   He reports no ill effects with the use of the medication and there is a posi encounter       Imaging & Referrals:  None       #[id number]

## 2018-10-23 ENCOUNTER — TELEPHONE (OUTPATIENT)
Dept: INTERNAL MEDICINE CLINIC | Facility: CLINIC | Age: 68
End: 2018-10-23

## 2018-10-23 NOTE — TELEPHONE ENCOUNTER
Pharmacy called in with a drug interaction of medications below. Pharmacy states that it is recommended that the simvastatin not exceed 20MG if amlodipine is prescribed. Ref #[id number]     Please advise.        Current Outpatient Medications:   •  simva

## 2018-10-24 NOTE — TELEPHONE ENCOUNTER
Please contact the patient. Advised him that his pharmacy is contacted as an we have discussed the potential risks of the amlodipine with the simvastatin. Overall the risk is very small that anything would happen.   He seems to be tolerating both of the m

## 2018-10-25 NOTE — TELEPHONE ENCOUNTER
Advised patient of Dr. Cristiane Coates note and notes below. Patient verbalized understanding and will cut the simvastatin 40mg tablets in half.

## 2018-11-28 DIAGNOSIS — I10 ESSENTIAL HYPERTENSION: ICD-10-CM

## 2018-11-28 RX ORDER — LABETALOL 100 MG/1
TABLET, FILM COATED ORAL
Qty: 360 TABLET | Refills: 0 | Status: SHIPPED | OUTPATIENT
Start: 2018-11-28 | End: 2019-02-06

## 2018-11-28 NOTE — TELEPHONE ENCOUNTER
Hypertensive Medications  Protocol Criteria:  · Appointment scheduled in the past 6 months or in the next 3 months  · BMP or CMP in the past 12 months  · Creatinine result < 2  Recent Outpatient Visits            1 month ago Onychomycosis    [facility]

## 2018-12-11 RX ORDER — SIMVASTATIN 40 MG
TABLET ORAL
Qty: 90 TABLET | Refills: 1 | Status: SHIPPED | OUTPATIENT
Start: 2018-12-11 | End: 2019-05-07

## 2018-12-11 RX ORDER — AMLODIPINE BESYLATE 5 MG/1
TABLET ORAL
Qty: 90 TABLET | Refills: 1 | Status: SHIPPED | OUTPATIENT
Start: 2018-12-11 | End: 2019-05-07

## 2018-12-11 RX ORDER — FINASTERIDE 5 MG/1
TABLET, FILM COATED ORAL
Qty: 90 TABLET | Refills: 1 | Status: SHIPPED | OUTPATIENT
Start: 2018-12-11 | End: 2019-05-07

## 2018-12-11 NOTE — TELEPHONE ENCOUNTER
JSK please advise on refills and drug message below from pharmacy/epic    Plasma concentrations and pharmacologic effects of simvastatin may be increased by amlodipine.  The daily dose of simvastatin should not exceed 20 mg in patients receiving amlodipine the next 3 months  · BMP or CMP in the past 12 months  · Creatinine result < 2  Recent Outpatient Visits            1 month ago Onychomycosis    [facility], [place], [address], [place], [place]    Office Visit    2 months ago Need for vacci

## 2019-02-06 DIAGNOSIS — I10 ESSENTIAL HYPERTENSION: ICD-10-CM

## 2019-02-07 RX ORDER — HYDROCHLOROTHIAZIDE 12.5 MG/1
TABLET ORAL
Qty: 90 TABLET | Refills: 1 | Status: SHIPPED | OUTPATIENT
Start: 2019-02-07 | End: 2019-07-25

## 2019-02-07 RX ORDER — LABETALOL 100 MG/1
TABLET, FILM COATED ORAL
Qty: 360 TABLET | Refills: 1 | Status: SHIPPED | OUTPATIENT
Start: 2019-02-07 | End: 2019-07-25

## 2019-05-07 DIAGNOSIS — I10 ESSENTIAL HYPERTENSION: ICD-10-CM

## 2019-05-09 NOTE — TELEPHONE ENCOUNTER
Please review; protocol failed due to appt and labs.     Requested Prescriptions     Pending Prescriptions Disp Refills   • AMLODIPINE BESYLATE 5 MG Oral Tab [Pharmacy Med Name: AMLODIPINE  5MG  TAB] 90 tablet 1     Sig: TAKE 1 TABLET BY MOUTH  DAILY   • SI

## 2019-05-10 RX ORDER — SIMVASTATIN 40 MG
TABLET ORAL
Qty: 90 TABLET | Refills: 0 | Status: SHIPPED | OUTPATIENT
Start: 2019-05-10 | End: 2019-07-25

## 2019-05-10 RX ORDER — LISINOPRIL 40 MG/1
TABLET ORAL
Qty: 90 TABLET | Refills: 0 | Status: SHIPPED | OUTPATIENT
Start: 2019-05-10 | End: 2019-07-25

## 2019-05-10 RX ORDER — FINASTERIDE 5 MG/1
TABLET, FILM COATED ORAL
Qty: 90 TABLET | Refills: 0 | Status: SHIPPED | OUTPATIENT
Start: 2019-05-10 | End: 2019-07-25

## 2019-05-10 RX ORDER — AMLODIPINE BESYLATE 5 MG/1
TABLET ORAL
Qty: 90 TABLET | Refills: 0 | Status: SHIPPED | OUTPATIENT
Start: 2019-05-10 | End: 2019-07-25

## 2019-05-21 ENCOUNTER — OFFICE VISIT (OUTPATIENT)
Dept: SURGERY | Facility: CLINIC | Age: 69
End: 2019-05-21
Payer: MEDICARE

## 2019-05-21 VITALS
HEIGHT: 73 IN | TEMPERATURE: 98 F | WEIGHT: 260 LBS | DIASTOLIC BLOOD PRESSURE: 90 MMHG | HEART RATE: 59 BPM | RESPIRATION RATE: 16 BRPM | BODY MASS INDEX: 34.46 KG/M2 | SYSTOLIC BLOOD PRESSURE: 150 MMHG

## 2019-05-21 DIAGNOSIS — N40.1 BPH WITH OBSTRUCTION/LOWER URINARY TRACT SYMPTOMS: Primary | ICD-10-CM

## 2019-05-21 DIAGNOSIS — N13.8 BPH WITH OBSTRUCTION/LOWER URINARY TRACT SYMPTOMS: Primary | ICD-10-CM

## 2019-05-21 DIAGNOSIS — C64.2 MALIGNANT NEOPLASM OF LEFT KIDNEY (HCC): ICD-10-CM

## 2019-05-21 PROCEDURE — 99214 OFFICE O/P EST MOD 30 MIN: CPT | Performed by: UROLOGY

## 2019-05-21 PROCEDURE — G0463 HOSPITAL OUTPT CLINIC VISIT: HCPCS | Performed by: UROLOGY

## 2019-05-21 RX ORDER — ALFUZOSIN HYDROCHLORIDE 10 MG/1
10 TABLET, EXTENDED RELEASE ORAL DAILY
Qty: 30 TABLET | Refills: 0 | Status: SHIPPED | OUTPATIENT
Start: 2019-05-21 | End: 2019-07-03

## 2019-05-21 RX ORDER — ALFUZOSIN HYDROCHLORIDE 10 MG/1
10 TABLET, EXTENDED RELEASE ORAL DAILY
Qty: 90 TABLET | Refills: 3 | Status: SHIPPED | OUTPATIENT
Start: 2019-05-21 | End: 2020-03-13

## 2019-05-21 NOTE — PROGRESS NOTES
SUBJECTIVE:  Hayder Lira is a 77year old male who presents for an office visit regarding  benign prostatic hyperplasia and history of renal cancer. He states that the problem is unchanged. Symptoms include see below.   Long term patient of Dr. Catarino Diop tightness, wheezing, cough, shortness of breath,  sputum production,chest pain or pleurisy. CARDIOVASCULAR:  Negative for pain or chest discomfort, dizziness or fainting, palpitations, leg swelling, nocturia, or claudication.   GASTROINTESTINAL:  Negative unless the patient develop any signs or symptoms of metastatic or locally recurrent disease. Consider chest x-ray after his next visit, cmp. – Scheduled for office cystoscopy and consideration for surgical therapy of his BPH. –PSA after next visit.     Kayode Lopez

## 2019-07-03 ENCOUNTER — OFFICE VISIT (OUTPATIENT)
Dept: INTERNAL MEDICINE CLINIC | Facility: CLINIC | Age: 69
End: 2019-07-03
Payer: MEDICARE

## 2019-07-03 ENCOUNTER — APPOINTMENT (OUTPATIENT)
Dept: LAB | Facility: HOSPITAL | Age: 69
End: 2019-07-03
Attending: INTERNAL MEDICINE
Payer: MEDICARE

## 2019-07-03 VITALS
TEMPERATURE: 98 F | HEIGHT: 73 IN | DIASTOLIC BLOOD PRESSURE: 76 MMHG | BODY MASS INDEX: 35.67 KG/M2 | RESPIRATION RATE: 20 BRPM | HEART RATE: 62 BPM | SYSTOLIC BLOOD PRESSURE: 128 MMHG | WEIGHT: 269.13 LBS

## 2019-07-03 DIAGNOSIS — M19.90 OSTEOARTHRITIS, UNSPECIFIED OSTEOARTHRITIS TYPE, UNSPECIFIED SITE: ICD-10-CM

## 2019-07-03 DIAGNOSIS — Z23 NEED FOR VACCINATION: ICD-10-CM

## 2019-07-03 DIAGNOSIS — Z00.00 ENCOUNTER FOR ANNUAL HEALTH EXAMINATION: Primary | ICD-10-CM

## 2019-07-03 DIAGNOSIS — R10.9 ABDOMINAL PAIN, UNSPECIFIED ABDOMINAL LOCATION: ICD-10-CM

## 2019-07-03 DIAGNOSIS — I10 ESSENTIAL HYPERTENSION: ICD-10-CM

## 2019-07-03 DIAGNOSIS — E55.9 VITAMIN D DEFICIENCY: ICD-10-CM

## 2019-07-03 DIAGNOSIS — E78.5 HYPERLIPIDEMIA, UNSPECIFIED HYPERLIPIDEMIA TYPE: ICD-10-CM

## 2019-07-03 DIAGNOSIS — N40.0 BENIGN PROSTATIC HYPERPLASIA WITHOUT LOWER URINARY TRACT SYMPTOMS: ICD-10-CM

## 2019-07-03 LAB — 25(OH)D3 SERPL-MCNC: 24.6 NG/ML (ref 30–100)

## 2019-07-03 PROCEDURE — 82306 VITAMIN D 25 HYDROXY: CPT

## 2019-07-03 PROCEDURE — G0439 PPPS, SUBSEQ VISIT: HCPCS | Performed by: INTERNAL MEDICINE

## 2019-07-03 PROCEDURE — 36415 COLL VENOUS BLD VENIPUNCTURE: CPT

## 2019-07-03 NOTE — PATIENT INSTRUCTIONS
Theo De Santiago's SCREENING SCHEDULE   Tests on this list are recommended by your physician but may not be covered, or covered at this frequency, by your insurer. Please check with your insurance carrier before scheduling to verify coverage.     PREVENTATI Cancer Screening Covered up to Age 76     Colonoscopy Screen   Covered every 10 years- more often if abnormal Colonoscopy due on 01/02/2021 Update Health Maintenance if applicable    Flex Sigmoidoscopy Screen  Covered every 5 years No results found for [facility] may be covered with your prescription benefits, but Medicare does not cover unless Medically needed    Zoster (Not covered by Medicare Part B) No orders found for this or any previous visit.  This may be covered with your pharmacy  prescription benefits

## 2019-07-03 NOTE — PROGRESS NOTES
HPI:   Silvio Hernandez is a 77year old male who presents for a Medicare Subsequent Annual Wellness visit (Pt already had Initial Annual Wellness).     Patient is here for Medicare annual wellness visit and follow-up on chronic medical issues as listed bel risk: Fall/Risk Scorin    Cognitive Assessment   He had a completely normal cognitive assessment- see flowsheet entries    Functional Ability/Status   Rosita Rich has some abnormal functions as listed below:  He has no issues with dressing and bathi Readings from Last 3 Encounters:  07/03/19 : 269 lb 1.6 oz (122.1 kg)  05/21/19 : 260 lb (117.9 kg)  10/03/18 : 260 lb 1.6 oz (118 kg)     Last Cholesterol Labs:   Lab Results   Component Value Date    CHOLEST 115 12/30/2017    HDL 39 12/30/2017    LDL 66 mother; Cancer in his mother. SOCIAL HISTORY:   He  reports that he has never smoked. He has never used smokeless tobacco. He reports that he drinks alcohol. He reports that he does not use drugs.      REVIEW OF SYSTEMS:   Review of Systems   Constitution misunderstand some words in a sentence and need to ask people to repeat themselves:  No   I especially have trouble understanding the speech of women and children:  No I have trouble understanding the speaker in a large room such as at a meeting or place o Right: No inguinal adenopathy present. Left: No inguinal adenopathy present. Neurological: He is alert. No cranial nerve deficit. Coordination normal.   Skin: Skin is warm and dry. No rash noted.    Left foot with skin growth/tag   Psychiatric: He February. May be related to season and lack of sunshine. Recheck at this time. - VITAMIN D, 25-HYDROXY; Future    8. Abdominal pain, unspecified abdominal location  Vague upper abdominal pain usually occurring overnight.   Exam is normal.  Up-to-date on 10 years No results found for this or any previous visit. No flowsheet data found. Fecal Occult Blood Annually No results found for: FOB No flowsheet data found.     Glaucoma Screening      Ophthalmology Visit Annually: Diabetics, FHx Glaucoma, AA>50, H Serum Conc  Annually No results found for: DIGOXIN, DIG, VALP No flowsheet data found.

## 2019-07-08 ENCOUNTER — OFFICE VISIT (OUTPATIENT)
Dept: SURGERY | Facility: CLINIC | Age: 69
End: 2019-07-08
Payer: MEDICARE

## 2019-07-08 VITALS
RESPIRATION RATE: 18 BRPM | DIASTOLIC BLOOD PRESSURE: 95 MMHG | WEIGHT: 265 LBS | HEART RATE: 67 BPM | SYSTOLIC BLOOD PRESSURE: 163 MMHG | BODY MASS INDEX: 35.12 KG/M2 | TEMPERATURE: 98 F | HEIGHT: 73 IN

## 2019-07-08 DIAGNOSIS — N40.1 BPH WITH OBSTRUCTION/LOWER URINARY TRACT SYMPTOMS: Primary | ICD-10-CM

## 2019-07-08 DIAGNOSIS — C64.2 MALIGNANT NEOPLASM OF LEFT KIDNEY (HCC): ICD-10-CM

## 2019-07-08 DIAGNOSIS — N13.8 BPH WITH OBSTRUCTION/LOWER URINARY TRACT SYMPTOMS: Primary | ICD-10-CM

## 2019-07-08 PROCEDURE — G0463 HOSPITAL OUTPT CLINIC VISIT: HCPCS | Performed by: UROLOGY

## 2019-07-08 PROCEDURE — 99213 OFFICE O/P EST LOW 20 MIN: CPT | Performed by: UROLOGY

## 2019-07-08 PROCEDURE — 52000 CYSTOURETHROSCOPY: CPT | Performed by: UROLOGY

## 2019-07-08 RX ORDER — CIPROFLOXACIN 500 MG/1
500 TABLET, FILM COATED ORAL ONCE
Status: COMPLETED | OUTPATIENT
Start: 2019-07-08 | End: 2019-07-08

## 2019-07-08 RX ADMIN — CIPROFLOXACIN 500 MG: 500 TABLET, FILM COATED ORAL at 08:01:00

## 2019-07-08 NOTE — PROGRESS NOTES
Alina Hirsch is a 77year old male. HPI:   Patient presents with:  Urinary Symptoms (urologic): patient presents for office cystoscopy.      64-year-old male with BPH, history of right renal cell cancer Status postleft partial nephrectomy at [facility] in TAKE 1 TABLET BY MOUTH  DAILY Disp: 90 tablet Rfl: 0   SIMVASTATIN 40 MG Oral Tab TAKE 1 TABLET BY MOUTH  NIGHTLY Disp: 90 tablet Rfl: 0   FINASTERIDE 5 MG Oral Tab TAKE 1 TABLET BY MOUTH ONCE DAILY Disp: 90 tablet Rfl: 0   HYDROCHLOROTHIAZIDE 12.5 MG Oral Mahendra Godinez MD

## 2019-07-25 DIAGNOSIS — I10 ESSENTIAL HYPERTENSION: ICD-10-CM

## 2019-07-25 NOTE — TELEPHONE ENCOUNTER
Please review; protocol failed.     Requested Prescriptions     Pending Prescriptions Disp Refills   • LISINOPRIL 40 MG Oral Tab [Pharmacy Med Name: LISINOPRIL  40MG  TAB] 90 tablet 0     Sig: TAKE 1 TABLET BY MOUTH ONCE DAILY   • HYDROCHLOROTHIAZIDE 12.5 M

## 2019-07-26 RX ORDER — LABETALOL 100 MG/1
TABLET, FILM COATED ORAL
Qty: 360 TABLET | Refills: 1 | Status: SHIPPED | OUTPATIENT
Start: 2019-07-26 | End: 2019-12-19

## 2019-07-26 RX ORDER — LISINOPRIL 40 MG/1
TABLET ORAL
Qty: 90 TABLET | Refills: 1 | Status: SHIPPED | OUTPATIENT
Start: 2019-07-26 | End: 2019-12-19

## 2019-07-26 RX ORDER — SIMVASTATIN 40 MG
TABLET ORAL
Qty: 90 TABLET | Refills: 1 | Status: SHIPPED | OUTPATIENT
Start: 2019-07-26 | End: 2019-12-19

## 2019-07-26 RX ORDER — HYDROCHLOROTHIAZIDE 12.5 MG/1
TABLET ORAL
Qty: 90 TABLET | Refills: 1 | Status: SHIPPED | OUTPATIENT
Start: 2019-07-26 | End: 2019-12-19

## 2019-07-26 RX ORDER — AMLODIPINE BESYLATE 5 MG/1
TABLET ORAL
Qty: 90 TABLET | Refills: 1 | Status: SHIPPED | OUTPATIENT
Start: 2019-07-26 | End: 2019-12-19

## 2019-07-26 RX ORDER — FINASTERIDE 5 MG/1
TABLET, FILM COATED ORAL
Qty: 90 TABLET | Refills: 1 | Status: SHIPPED | OUTPATIENT
Start: 2019-07-26 | End: 2019-12-19

## 2019-10-19 ENCOUNTER — NURSE ONLY (OUTPATIENT)
Dept: INTERNAL MEDICINE CLINIC | Facility: CLINIC | Age: 69
End: 2019-10-19
Payer: MEDICARE

## 2019-10-19 VITALS — TEMPERATURE: 98 F

## 2019-10-19 DIAGNOSIS — Z23 NEED FOR INFLUENZA VACCINATION: Primary | ICD-10-CM

## 2019-10-19 DIAGNOSIS — Z23 NEED FOR PNEUMOCOCCAL VACCINATION: ICD-10-CM

## 2019-10-19 PROCEDURE — G0008 ADMIN INFLUENZA VIRUS VAC: HCPCS | Performed by: INTERNAL MEDICINE

## 2019-10-19 PROCEDURE — 90662 IIV NO PRSV INCREASED AG IM: CPT | Performed by: INTERNAL MEDICINE

## 2019-10-19 PROCEDURE — 99211 OFF/OP EST MAY X REQ PHY/QHP: CPT | Performed by: INTERNAL MEDICINE

## 2019-10-19 PROCEDURE — 90732 PPSV23 VACC 2 YRS+ SUBQ/IM: CPT | Performed by: INTERNAL MEDICINE

## 2019-10-19 PROCEDURE — G0009 ADMIN PNEUMOCOCCAL VACCINE: HCPCS | Performed by: INTERNAL MEDICINE

## 2019-10-19 PROCEDURE — G0463 HOSPITAL OUTPT CLINIC VISIT: HCPCS | Performed by: INTERNAL MEDICINE

## 2019-10-19 NOTE — PROGRESS NOTES
Pt presents for flu and [id number] vaccination. Verbal order confirmed with NICHOLE. Fluzone administered to right deltoid and [id number] administered to left deltoid. Pt tolerated both injections well. No adverse reactions noted.

## 2019-12-19 DIAGNOSIS — I10 ESSENTIAL HYPERTENSION: ICD-10-CM

## 2019-12-21 NOTE — TELEPHONE ENCOUNTER
Please review; protocol failed due to labs. Please advise.     Requested Prescriptions     Pending Prescriptions Disp Refills   • FINASTERIDE 5 MG Oral Tab [Pharmacy Med Name: FINASTERIDE 5MG TABLET] 90 tablet 1     Sig: TAKE 1 TABLET BY MOUTH ONCE DAILY

## 2019-12-22 RX ORDER — LABETALOL 100 MG/1
TABLET, FILM COATED ORAL
Qty: 360 TABLET | Refills: 1 | Status: SHIPPED | OUTPATIENT
Start: 2019-12-22 | End: 2020-05-29

## 2019-12-22 RX ORDER — AMLODIPINE BESYLATE 5 MG/1
TABLET ORAL
Qty: 90 TABLET | Refills: 1 | Status: SHIPPED | OUTPATIENT
Start: 2019-12-22 | End: 2020-05-29

## 2019-12-22 RX ORDER — SIMVASTATIN 40 MG
TABLET ORAL
Qty: 90 TABLET | Refills: 1 | Status: SHIPPED | OUTPATIENT
Start: 2019-12-22 | End: 2021-12-14

## 2019-12-22 RX ORDER — HYDROCHLOROTHIAZIDE 12.5 MG/1
TABLET ORAL
Qty: 90 TABLET | Refills: 1 | Status: SHIPPED | OUTPATIENT
Start: 2019-12-22 | End: 2020-05-29

## 2019-12-22 RX ORDER — FINASTERIDE 5 MG/1
TABLET, FILM COATED ORAL
Qty: 90 TABLET | Refills: 1 | Status: SHIPPED | OUTPATIENT
Start: 2019-12-22 | End: 2020-09-22

## 2019-12-22 RX ORDER — LISINOPRIL 40 MG/1
TABLET ORAL
Qty: 90 TABLET | Refills: 1 | Status: SHIPPED | OUTPATIENT
Start: 2019-12-22 | End: 2020-05-29

## 2020-01-08 ENCOUNTER — OFFICE VISIT (OUTPATIENT)
Dept: INTERNAL MEDICINE CLINIC | Facility: CLINIC | Age: 70
End: 2020-01-08
Payer: MEDICARE

## 2020-01-08 VITALS
HEIGHT: 73 IN | RESPIRATION RATE: 20 BRPM | TEMPERATURE: 98 F | DIASTOLIC BLOOD PRESSURE: 82 MMHG | WEIGHT: 266 LBS | BODY MASS INDEX: 35.25 KG/M2 | HEART RATE: 58 BPM | SYSTOLIC BLOOD PRESSURE: 136 MMHG

## 2020-01-08 DIAGNOSIS — E78.5 HYPERLIPIDEMIA, UNSPECIFIED HYPERLIPIDEMIA TYPE: ICD-10-CM

## 2020-01-08 DIAGNOSIS — Z12.5 SCREENING FOR PROSTATE CANCER: ICD-10-CM

## 2020-01-08 DIAGNOSIS — N40.0 BENIGN PROSTATIC HYPERPLASIA WITHOUT LOWER URINARY TRACT SYMPTOMS: ICD-10-CM

## 2020-01-08 DIAGNOSIS — M19.90 OSTEOARTHRITIS, UNSPECIFIED OSTEOARTHRITIS TYPE, UNSPECIFIED SITE: ICD-10-CM

## 2020-01-08 DIAGNOSIS — I10 ESSENTIAL HYPERTENSION: Primary | ICD-10-CM

## 2020-01-08 DIAGNOSIS — E55.9 VITAMIN D DEFICIENCY: ICD-10-CM

## 2020-01-08 PROCEDURE — 99214 OFFICE O/P EST MOD 30 MIN: CPT | Performed by: INTERNAL MEDICINE

## 2020-01-08 PROCEDURE — G0463 HOSPITAL OUTPT CLINIC VISIT: HCPCS | Performed by: INTERNAL MEDICINE

## 2020-01-08 NOTE — PROGRESS NOTES
HPI:    Patient ID: Jose Liao is a 68year old male. HPI  Patient is here for follow-up on chronic medical issues as listed below. Last seen here about 6 months ago for annual wellness visit. Had some vague abdominal pain at that time.   Advised t 2005    Right TKR 10/2015      Family History   Problem Relation Age of Onset   • Cancer Mother    • Breast Cancer Mother    • Alcohol and Other Disorders Associated Father         alcoholism      Social History    Tobacco Use      Smoking status: Never Sm °F (36.4 °C) (Oral)   Resp 20   Ht 6' 1\" (1.854 m)   Wt 266 lb (120.7 kg)   BMI 35.09 kg/m²      Physical Exam    Constitutional: He appears well-developed and well-nourished.    HENT:   Nose: Nose normal.   Mouth/Throat: Oropharynx is clear and moist.   E sometime later in the year. 5. Osteoarthritis, unspecified osteoarthritis type, unspecified site  Status post knee replacements on both knees. He has some asymmetric bulging on the right knee but it is not symptomatic.   No need for further treatment ri

## 2020-01-21 ENCOUNTER — OFFICE VISIT (OUTPATIENT)
Dept: SURGERY | Facility: CLINIC | Age: 70
End: 2020-01-21
Payer: MEDICARE

## 2020-01-21 VITALS
HEIGHT: 73 IN | TEMPERATURE: 98 F | WEIGHT: 262 LBS | OXYGEN SATURATION: 95 % | BODY MASS INDEX: 34.72 KG/M2 | HEART RATE: 67 BPM | SYSTOLIC BLOOD PRESSURE: 152 MMHG | DIASTOLIC BLOOD PRESSURE: 88 MMHG

## 2020-01-21 DIAGNOSIS — N40.1 BPH WITH OBSTRUCTION/LOWER URINARY TRACT SYMPTOMS: Primary | ICD-10-CM

## 2020-01-21 DIAGNOSIS — C64.2 MALIGNANT NEOPLASM OF LEFT KIDNEY (HCC): ICD-10-CM

## 2020-01-21 DIAGNOSIS — R39.9 SYMPTOM INVOLVING BLADDER: ICD-10-CM

## 2020-01-21 DIAGNOSIS — Z01.818 PREOP EXAMINATION: ICD-10-CM

## 2020-01-21 DIAGNOSIS — N13.8 BPH WITH OBSTRUCTION/LOWER URINARY TRACT SYMPTOMS: Primary | ICD-10-CM

## 2020-01-21 PROCEDURE — 99214 OFFICE O/P EST MOD 30 MIN: CPT | Performed by: UROLOGY

## 2020-01-21 PROCEDURE — G0463 HOSPITAL OUTPT CLINIC VISIT: HCPCS | Performed by: UROLOGY

## 2020-01-21 NOTE — PROGRESS NOTES
Patient seen in office, scheduled for Cystoscopy, Green light Laser Transuretheral Resection of the prostate, Wednesday 02/26/2020, [facility]/outpatient, went over pre-op/labs with patient verbalized understanding.

## 2020-01-21 NOTE — PROGRESS NOTES
Sena Driver is a 68year old male. HPI:   Patient presents with:  BPH      43-year-old male presents in follow-up to visit July 8, 2019 with BPH lower urinary tract symptoms refractory to treatment with alfuzocin and finasteride chronically.   Has a p Medications (Active prior to today's visit):  Current Outpatient Medications   Medication Sig Dispense Refill   • FINASTERIDE 5 MG Oral Tab TAKE 1 TABLET BY MOUTH ONCE DAILY 90 tablet 1   • HYDROCHLOROTHIAZIDE 12.5 MG Oral Tab TAKE 1 TABLET BY MOUTH ON

## 2020-01-29 ENCOUNTER — HOSPITAL ENCOUNTER (OUTPATIENT)
Dept: GENERAL RADIOLOGY | Facility: HOSPITAL | Age: 70
Discharge: HOME | End: 2020-01-29
Attending: ORTHOPAEDIC SURGERY
Payer: MEDICARE

## 2020-01-29 ENCOUNTER — OFFICE VISIT (OUTPATIENT)
Dept: ORTHOPEDICS CLINIC | Facility: CLINIC | Age: 70
End: 2020-01-29
Payer: MEDICARE

## 2020-01-29 VITALS — HEIGHT: 73 IN | BODY MASS INDEX: 34.72 KG/M2 | WEIGHT: 262 LBS

## 2020-01-29 DIAGNOSIS — M25.562 LEFT KNEE PAIN, UNSPECIFIED CHRONICITY: Primary | ICD-10-CM

## 2020-01-29 DIAGNOSIS — M25.562 LEFT KNEE PAIN, UNSPECIFIED CHRONICITY: ICD-10-CM

## 2020-01-29 PROCEDURE — G0463 HOSPITAL OUTPT CLINIC VISIT: HCPCS | Performed by: ORTHOPAEDIC SURGERY

## 2020-01-29 PROCEDURE — 99213 OFFICE O/P EST LOW 20 MIN: CPT | Performed by: ORTHOPAEDIC SURGERY

## 2020-01-29 PROCEDURE — 73564 X-RAY EXAM KNEE 4 OR MORE: CPT | Performed by: ORTHOPAEDIC SURGERY

## 2020-01-29 NOTE — PROGRESS NOTES
NURSING INTAKE COMMENTS: Patient presents with:  Consult: Marion 3 weeks ago his left knee started to hurt only when going up the stairs. At todays visit the sharp pain has improved. Recalls no specific injuires that caused the pain.   Stts he had a left kn BY MOUTH  DAILY 90 tablet 1   • Alfuzosin HCl ER 10 MG Oral Tablet 24 Hr Take 1 tablet (10 mg total) by mouth daily.  90 tablet 3     No Known Allergies  Family History   Problem Relation Age of Onset   • Cancer Mother    • Breast Cancer Mother    • Alcohol to be a well aligned well fixed total knee arthroplasty.   There may be a mild lucency in the femoral component    Lab Results   Component Value Date    WBC 6.0 12/30/2017    HGB 13.2 (L) 12/30/2017     12/30/2017      Lab Results   Component Value D

## 2020-02-15 ENCOUNTER — HOSPITAL ENCOUNTER (OUTPATIENT)
Dept: GENERAL RADIOLOGY | Facility: HOSPITAL | Age: 70
Discharge: HOME | End: 2020-02-15
Attending: UROLOGY
Payer: MEDICARE

## 2020-02-15 ENCOUNTER — APPOINTMENT (OUTPATIENT)
Dept: LAB | Facility: HOSPITAL | Age: 70
End: 2020-02-15
Attending: UROLOGY
Payer: MEDICARE

## 2020-02-15 ENCOUNTER — LAB ENCOUNTER (OUTPATIENT)
Dept: LAB | Facility: HOSPITAL | Age: 70
End: 2020-02-15
Attending: UROLOGY
Payer: MEDICARE

## 2020-02-15 DIAGNOSIS — I10 ESSENTIAL HYPERTENSION: ICD-10-CM

## 2020-02-15 DIAGNOSIS — E55.9 VITAMIN D DEFICIENCY: ICD-10-CM

## 2020-02-15 DIAGNOSIS — Z01.818 PREOP EXAMINATION: ICD-10-CM

## 2020-02-15 DIAGNOSIS — Z12.5 SCREENING FOR PROSTATE CANCER: ICD-10-CM

## 2020-02-15 DIAGNOSIS — R39.9 SYMPTOM INVOLVING BLADDER: ICD-10-CM

## 2020-02-15 LAB
ALBUMIN SERPL-MCNC: 4 G/DL (ref 3.4–5)
ALBUMIN/GLOB SERPL: 1.1 {RATIO} (ref 1–2)
ALP LIVER SERPL-CCNC: 71 U/L (ref 45–117)
ALT SERPL-CCNC: 25 U/L (ref 16–61)
ANION GAP SERPL CALC-SCNC: 6 MMOL/L (ref 0–18)
AST SERPL-CCNC: 26 U/L (ref 15–37)
BASOPHILS # BLD AUTO: 0.02 X10(3) UL (ref 0–0.2)
BASOPHILS NFR BLD AUTO: 0.3 %
BILIRUB SERPL-MCNC: 0.8 MG/DL (ref 0.1–2)
BILIRUB UR QL: NEGATIVE
BUN BLD-MCNC: 18 MG/DL (ref 7–18)
BUN/CREAT SERPL: 18 (ref 10–20)
CALCIUM BLD-MCNC: 9 MG/DL (ref 8.5–10.1)
CHLORIDE SERPL-SCNC: 110 MMOL/L (ref 98–112)
CHOLEST SMN-MCNC: 122 MG/DL (ref ?–200)
CO2 SERPL-SCNC: 28 MMOL/L (ref 21–32)
COLOR UR: YELLOW
COMPLEXED PSA SERPL-MCNC: 0.35 NG/ML (ref ?–4)
CREAT BLD-MCNC: 1 MG/DL (ref 0.7–1.3)
DEPRECATED RDW RBC AUTO: 45 FL (ref 35.1–46.3)
EOSINOPHIL # BLD AUTO: 0.22 X10(3) UL (ref 0–0.7)
EOSINOPHIL NFR BLD AUTO: 3.7 %
ERYTHROCYTE [DISTWIDTH] IN BLOOD BY AUTOMATED COUNT: 14.2 % (ref 11–15)
GLOBULIN PLAS-MCNC: 3.5 G/DL (ref 2.8–4.4)
GLUCOSE BLD-MCNC: 95 MG/DL (ref 70–99)
GLUCOSE UR-MCNC: NEGATIVE MG/DL
HCT VFR BLD AUTO: 42.9 % (ref 39–53)
HDLC SERPL-MCNC: 39 MG/DL (ref 40–59)
HGB BLD-MCNC: 14 G/DL (ref 13–17.5)
HGB UR QL STRIP.AUTO: NEGATIVE
IMM GRANULOCYTES # BLD AUTO: 0.02 X10(3) UL (ref 0–1)
IMM GRANULOCYTES NFR BLD: 0.3 %
KETONES UR-MCNC: NEGATIVE MG/DL
LDLC SERPL CALC-MCNC: 67 MG/DL (ref ?–100)
LEUKOCYTE ESTERASE UR QL STRIP.AUTO: NEGATIVE
LYMPHOCYTES # BLD AUTO: 1.47 X10(3) UL (ref 1–4)
LYMPHOCYTES NFR BLD AUTO: 24.9 %
M PROTEIN MFR SERPL ELPH: 7.5 G/DL (ref 6.4–8.2)
MCH RBC QN AUTO: 28.3 PG (ref 26–34)
MCHC RBC AUTO-ENTMCNC: 32.6 G/DL (ref 31–37)
MCV RBC AUTO: 86.7 FL (ref 80–100)
MONOCYTES # BLD AUTO: 0.64 X10(3) UL (ref 0.1–1)
MONOCYTES NFR BLD AUTO: 10.8 %
NEUTROPHILS # BLD AUTO: 3.53 X10 (3) UL (ref 1.5–7.7)
NEUTROPHILS # BLD AUTO: 3.53 X10(3) UL (ref 1.5–7.7)
NEUTROPHILS NFR BLD AUTO: 60 %
NITRITE UR QL STRIP.AUTO: NEGATIVE
NONHDLC SERPL-MCNC: 83 MG/DL (ref ?–130)
OSMOLALITY SERPL CALC.SUM OF ELEC: 300 MOSM/KG (ref 275–295)
PATIENT FASTING Y/N/NP: YES
PATIENT FASTING Y/N/NP: YES
PH UR: 7 [PH] (ref 5–8)
PLATELET # BLD AUTO: 193 10(3)UL (ref 150–450)
POTASSIUM SERPL-SCNC: 3.7 MMOL/L (ref 3.5–5.1)
PROT UR-MCNC: NEGATIVE MG/DL
RBC # BLD AUTO: 4.95 X10(6)UL (ref 3.8–5.8)
SODIUM SERPL-SCNC: 144 MMOL/L (ref 136–145)
SP GR UR STRIP: 1.02 (ref 1–1.03)
TRIGL SERPL-MCNC: 79 MG/DL (ref 30–149)
TSI SER-ACNC: 2.76 MIU/ML (ref 0.36–3.74)
UROBILINOGEN UR STRIP-ACNC: <2
VIT B12 SERPL-MCNC: 320 PG/ML (ref 193–986)
VLDLC SERPL CALC-MCNC: 16 MG/DL (ref 0–30)
WBC # BLD AUTO: 5.9 X10(3) UL (ref 4–11)

## 2020-02-15 PROCEDURE — 82306 VITAMIN D 25 HYDROXY: CPT

## 2020-02-15 PROCEDURE — 93010 ELECTROCARDIOGRAM REPORT: CPT | Performed by: UROLOGY

## 2020-02-15 PROCEDURE — 87086 URINE CULTURE/COLONY COUNT: CPT

## 2020-02-15 PROCEDURE — 82607 VITAMIN B-12: CPT

## 2020-02-15 PROCEDURE — 36415 COLL VENOUS BLD VENIPUNCTURE: CPT

## 2020-02-15 PROCEDURE — 85025 COMPLETE CBC W/AUTO DIFF WBC: CPT

## 2020-02-15 PROCEDURE — 80061 LIPID PANEL: CPT

## 2020-02-15 PROCEDURE — 71046 X-RAY EXAM CHEST 2 VIEWS: CPT | Performed by: UROLOGY

## 2020-02-15 PROCEDURE — 93005 ELECTROCARDIOGRAM TRACING: CPT

## 2020-02-15 PROCEDURE — 80053 COMPREHEN METABOLIC PANEL: CPT

## 2020-02-15 PROCEDURE — 81003 URINALYSIS AUTO W/O SCOPE: CPT

## 2020-02-15 PROCEDURE — 84443 ASSAY THYROID STIM HORMONE: CPT

## 2020-02-17 LAB — 25(OH)D3 SERPL-MCNC: 29.7 NG/ML (ref 30–100)

## 2020-02-19 RX ORDER — FAMOTIDINE 20 MG/1
20 TABLET ORAL ONCE
Status: DISCONTINUED | OUTPATIENT
Start: 2020-02-19 | End: 2020-02-19

## 2020-02-19 RX ORDER — SODIUM CHLORIDE, SODIUM LACTATE, POTASSIUM CHLORIDE, CALCIUM CHLORIDE 600; 310; 30; 20 MG/100ML; MG/100ML; MG/100ML; MG/100ML
INJECTION, SOLUTION INTRAVENOUS CONTINUOUS
Status: DISCONTINUED | OUTPATIENT
Start: 2020-02-19 | End: 2020-02-19

## 2020-02-19 RX ORDER — ACETAMINOPHEN 500 MG
1000 TABLET ORAL ONCE
Status: DISCONTINUED | OUTPATIENT
Start: 2020-02-19 | End: 2020-02-19

## 2020-02-19 RX ORDER — METOCLOPRAMIDE 10 MG/1
10 TABLET ORAL ONCE
Status: DISCONTINUED | OUTPATIENT
Start: 2020-02-19 | End: 2020-02-19

## 2020-02-26 ENCOUNTER — ANESTHESIA (OUTPATIENT)
Dept: SURGERY | Facility: HOSPITAL | Age: 70
End: 2020-02-26
Payer: MEDICARE

## 2020-02-26 ENCOUNTER — HOSPITAL ENCOUNTER (OUTPATIENT)
Facility: HOSPITAL | Age: 70
Setting detail: HOSPITAL OUTPATIENT SURGERY
Discharge: HOME | End: 2020-02-26
Attending: UROLOGY | Admitting: UROLOGY
Payer: MEDICARE

## 2020-02-26 ENCOUNTER — ANESTHESIA EVENT (OUTPATIENT)
Dept: SURGERY | Facility: HOSPITAL | Age: 70
End: 2020-02-26
Payer: MEDICARE

## 2020-02-26 VITALS
DIASTOLIC BLOOD PRESSURE: 72 MMHG | WEIGHT: 260 LBS | HEIGHT: 73 IN | BODY MASS INDEX: 34.46 KG/M2 | SYSTOLIC BLOOD PRESSURE: 128 MMHG | RESPIRATION RATE: 20 BRPM | OXYGEN SATURATION: 96 % | HEART RATE: 60 BPM | TEMPERATURE: 98 F

## 2020-02-26 DIAGNOSIS — N13.8 BPH WITH OBSTRUCTION/LOWER URINARY TRACT SYMPTOMS: ICD-10-CM

## 2020-02-26 DIAGNOSIS — N40.1 BPH WITH OBSTRUCTION/LOWER URINARY TRACT SYMPTOMS: ICD-10-CM

## 2020-02-26 PROCEDURE — 52648 LASER SURGERY OF PROSTATE: CPT | Performed by: UROLOGY

## 2020-02-26 PROCEDURE — 0VB08ZZ EXCISION OF PROSTATE, VIA NATURAL OR ARTIFICIAL OPENING ENDOSCOPIC: ICD-10-PCS | Performed by: UROLOGY

## 2020-02-26 RX ORDER — SODIUM CHLORIDE, SODIUM LACTATE, POTASSIUM CHLORIDE, CALCIUM CHLORIDE 600; 310; 30; 20 MG/100ML; MG/100ML; MG/100ML; MG/100ML
INJECTION, SOLUTION INTRAVENOUS CONTINUOUS
Status: DISCONTINUED | OUTPATIENT
Start: 2020-02-26 | End: 2020-02-26

## 2020-02-26 RX ORDER — SODIUM CHLORIDE 9 MG/ML
INJECTION, SOLUTION INTRAVENOUS CONTINUOUS
Status: DISCONTINUED | OUTPATIENT
Start: 2020-02-26 | End: 2020-02-26

## 2020-02-26 RX ORDER — DEXAMETHASONE SODIUM PHOSPHATE 4 MG/ML
VIAL (ML) INJECTION AS NEEDED
Status: DISCONTINUED | OUTPATIENT
Start: 2020-02-26 | End: 2020-02-26 | Stop reason: SURG

## 2020-02-26 RX ORDER — HYDROMORPHONE HYDROCHLORIDE 1 MG/ML
0.2 INJECTION, SOLUTION INTRAMUSCULAR; INTRAVENOUS; SUBCUTANEOUS EVERY 5 MIN PRN
Status: DISCONTINUED | OUTPATIENT
Start: 2020-02-26 | End: 2020-02-26

## 2020-02-26 RX ORDER — MORPHINE SULFATE 4 MG/ML
4 INJECTION, SOLUTION INTRAMUSCULAR; INTRAVENOUS EVERY 10 MIN PRN
Status: DISCONTINUED | OUTPATIENT
Start: 2020-02-26 | End: 2020-02-26

## 2020-02-26 RX ORDER — EPHEDRINE SULFATE 50 MG/ML
INJECTION, SOLUTION INTRAVENOUS AS NEEDED
Status: DISCONTINUED | OUTPATIENT
Start: 2020-02-26 | End: 2020-02-26 | Stop reason: SURG

## 2020-02-26 RX ORDER — SODIUM CHLORIDE, SODIUM LACTATE, POTASSIUM CHLORIDE, CALCIUM CHLORIDE 600; 310; 30; 20 MG/100ML; MG/100ML; MG/100ML; MG/100ML
INJECTION, SOLUTION INTRAVENOUS CONTINUOUS PRN
Status: DISCONTINUED | OUTPATIENT
Start: 2020-02-26 | End: 2020-02-26 | Stop reason: SURG

## 2020-02-26 RX ORDER — METOCLOPRAMIDE 10 MG/1
10 TABLET ORAL ONCE
Status: COMPLETED | OUTPATIENT
Start: 2020-02-26 | End: 2020-02-26

## 2020-02-26 RX ORDER — FAMOTIDINE 20 MG/1
20 TABLET ORAL ONCE
Status: COMPLETED | OUTPATIENT
Start: 2020-02-26 | End: 2020-02-26

## 2020-02-26 RX ORDER — ONDANSETRON 2 MG/ML
4 INJECTION INTRAMUSCULAR; INTRAVENOUS ONCE AS NEEDED
Status: DISCONTINUED | OUTPATIENT
Start: 2020-02-26 | End: 2020-02-26

## 2020-02-26 RX ORDER — PHENAZOPYRIDINE HYDROCHLORIDE 200 MG/1
200 TABLET, FILM COATED ORAL 3 TIMES DAILY PRN
Qty: 10 TABLET | Refills: 0 | Status: SHIPPED | OUTPATIENT
Start: 2020-02-26 | End: 2020-07-08

## 2020-02-26 RX ORDER — MORPHINE SULFATE 10 MG/ML
6 INJECTION, SOLUTION INTRAMUSCULAR; INTRAVENOUS EVERY 10 MIN PRN
Status: DISCONTINUED | OUTPATIENT
Start: 2020-02-26 | End: 2020-02-26

## 2020-02-26 RX ORDER — HALOPERIDOL 5 MG/ML
0.25 INJECTION INTRAMUSCULAR ONCE AS NEEDED
Status: DISCONTINUED | OUTPATIENT
Start: 2020-02-26 | End: 2020-02-26

## 2020-02-26 RX ORDER — HYDROCODONE BITARTRATE AND ACETAMINOPHEN 5; 325 MG/1; MG/1
1 TABLET ORAL AS NEEDED
Status: DISCONTINUED | OUTPATIENT
Start: 2020-02-26 | End: 2020-02-26

## 2020-02-26 RX ORDER — MORPHINE SULFATE 4 MG/ML
2 INJECTION, SOLUTION INTRAMUSCULAR; INTRAVENOUS EVERY 10 MIN PRN
Status: DISCONTINUED | OUTPATIENT
Start: 2020-02-26 | End: 2020-02-26

## 2020-02-26 RX ORDER — METOPROLOL TARTRATE 5 MG/5ML
2.5 INJECTION INTRAVENOUS ONCE
Status: DISCONTINUED | OUTPATIENT
Start: 2020-02-26 | End: 2020-02-26

## 2020-02-26 RX ORDER — HYDROMORPHONE HYDROCHLORIDE 1 MG/ML
0.6 INJECTION, SOLUTION INTRAMUSCULAR; INTRAVENOUS; SUBCUTANEOUS EVERY 5 MIN PRN
Status: DISCONTINUED | OUTPATIENT
Start: 2020-02-26 | End: 2020-02-26

## 2020-02-26 RX ORDER — NALOXONE HYDROCHLORIDE 0.4 MG/ML
80 INJECTION, SOLUTION INTRAMUSCULAR; INTRAVENOUS; SUBCUTANEOUS AS NEEDED
Status: DISCONTINUED | OUTPATIENT
Start: 2020-02-26 | End: 2020-02-26

## 2020-02-26 RX ORDER — PROCHLORPERAZINE EDISYLATE 5 MG/ML
5 INJECTION INTRAMUSCULAR; INTRAVENOUS ONCE AS NEEDED
Status: DISCONTINUED | OUTPATIENT
Start: 2020-02-26 | End: 2020-02-26

## 2020-02-26 RX ORDER — MAGNESIUM HYDROXIDE 1200 MG/15ML
LIQUID ORAL CONTINUOUS PRN
Status: COMPLETED | OUTPATIENT
Start: 2020-02-26 | End: 2020-02-26

## 2020-02-26 RX ORDER — PHENAZOPYRIDINE HYDROCHLORIDE 200 MG/1
200 TABLET, FILM COATED ORAL ONCE
Status: CANCELLED | OUTPATIENT
Start: 2020-02-26 | End: 2020-02-26

## 2020-02-26 RX ORDER — SULFAMETHOXAZOLE AND TRIMETHOPRIM 800; 160 MG/1; MG/1
1 TABLET ORAL 2 TIMES DAILY
Qty: 6 TABLET | Refills: 0 | Status: SHIPPED | OUTPATIENT
Start: 2020-02-27 | End: 2020-03-01

## 2020-02-26 RX ORDER — HYDROMORPHONE HYDROCHLORIDE 1 MG/ML
0.4 INJECTION, SOLUTION INTRAMUSCULAR; INTRAVENOUS; SUBCUTANEOUS EVERY 5 MIN PRN
Status: DISCONTINUED | OUTPATIENT
Start: 2020-02-26 | End: 2020-02-26

## 2020-02-26 RX ORDER — HYDROCODONE BITARTRATE AND ACETAMINOPHEN 5; 325 MG/1; MG/1
2 TABLET ORAL AS NEEDED
Status: DISCONTINUED | OUTPATIENT
Start: 2020-02-26 | End: 2020-02-26

## 2020-02-26 RX ORDER — ACETAMINOPHEN 500 MG
1000 TABLET ORAL ONCE
Status: COMPLETED | OUTPATIENT
Start: 2020-02-26 | End: 2020-02-26

## 2020-02-26 RX ORDER — ONDANSETRON 2 MG/ML
INJECTION INTRAMUSCULAR; INTRAVENOUS AS NEEDED
Status: DISCONTINUED | OUTPATIENT
Start: 2020-02-26 | End: 2020-02-26 | Stop reason: SURG

## 2020-02-26 RX ADMIN — EPHEDRINE SULFATE 5 MG: 50 INJECTION, SOLUTION INTRAVENOUS at 08:05:00

## 2020-02-26 RX ADMIN — ONDANSETRON 4 MG: 2 INJECTION INTRAMUSCULAR; INTRAVENOUS at 08:33:00

## 2020-02-26 RX ADMIN — SODIUM CHLORIDE, SODIUM LACTATE, POTASSIUM CHLORIDE, CALCIUM CHLORIDE: 600; 310; 30; 20 INJECTION, SOLUTION INTRAVENOUS at 07:40:00

## 2020-02-26 RX ADMIN — EPHEDRINE SULFATE 5 MG: 50 INJECTION, SOLUTION INTRAVENOUS at 08:08:00

## 2020-02-26 RX ADMIN — DEXAMETHASONE SODIUM PHOSPHATE 4 MG: 4 MG/ML VIAL (ML) INJECTION at 08:33:00

## 2020-02-26 RX ADMIN — EPHEDRINE SULFATE 5 MG: 50 INJECTION, SOLUTION INTRAVENOUS at 08:12:00

## 2020-02-26 NOTE — INTERVAL H&P NOTE
Pre-op Diagnosis: BPH with obstruction/lower urinary tract symptoms [N40.1, N13.8]    The above referenced H&P was reviewed by Sofia Galvan MD on 2/26/2020, the patient was examined and no significant changes have occurred in the patient's condition sinc

## 2020-02-26 NOTE — ANESTHESIA PREPROCEDURE EVALUATION
Anesthesia PreOp Note    HPI:     Meera Couch is a 68year old male who presents for preoperative consultation requested by:  Jeanie Ricardo MD    Date of Surgery: 2/26/2020    Procedure(s):  GREEN LIGHT LASER OF THE PROSTATE  CYSTOSCOPY TRANSURETHRAL R Unknown time  HYDROCHLOROTHIAZIDE 12.5 MG Oral Tab, TAKE 1 TABLET BY MOUTH ONCE DAILY, Disp: 90 tablet, Rfl: 1, 2/25/2020 at Unknown time  LABETALOL  MG Oral Tab, TAKE 2 TABLETS BY MOUTH TWO TIMES DAILY, Disp: 360 tablet, Rfl: 1, 2/26/2020 at [facility]: Not on file        Non-medical: Not on file    Tobacco Use      Smoking status: Never Smoker      Smokeless tobacco: Never Used    Substance and Sexual Activity      Alcohol use:  Yes        Alcohol/week: 4.0 standard drinks        Types: 4 Glass 02/15/2020    BUN 18 02/15/2020    CREATSERUM 1.00 02/15/2020    GLU 95 02/15/2020    CA 9.0 02/15/2020          Vital Signs: Body mass index is 34.3 kg/m². height is 1.854 m (6' 1\") and weight is 117.9 kg (260 lb). His oral temperature is 98.5 °F (36.

## 2020-02-26 NOTE — ANESTHESIA PROCEDURE NOTES
Airway  Urgency: Elective      General Information and Staff    Patient location during procedure: OR  Anesthesiologist: Dariusz Yang MD  Performed: anesthesiologist     Indications and Patient Condition  Indications for airway management: anesthesia

## 2020-02-26 NOTE — H&P
75-year-old male presents in follow-up to visit July 8, 2019 with BPH lower urinary tract symptoms refractory to treatment with alfuzocin and finasteride chronically.   Has a previous urologic history of renal cell cancer status post left partial nephrectom to today's visit):         Current Outpatient Medications   Medication Sig Dispense Refill   • FINASTERIDE 5 MG Oral Tab TAKE 1 TABLET BY MOUTH ONCE DAILY 90 tablet 1   • HYDROCHLOROTHIAZIDE 12.5 MG Oral Tab TAKE 1 TABLET BY MOUTH ONCE DAILY 90 tablet 1

## 2020-02-26 NOTE — OPERATIVE REPORT
[facility]     Operative Note           Demetri Magana Location: OR   [facility] [id number] MRN [id number]   Admission Date 2/26/2020 Operation Date 2/26/2020   Attending Physician Janice Garcia MD Operating Physician Sara Mitchell MD      Preop vaporization was done beyond the verumontanum. At the completion of the procedure there was a nice open channel through the urethra. The cystoscope was then removed.   An 25 Urdu coudé tipped Landeros catheter with a 5 cc balloon was then instilled into th

## 2020-02-26 NOTE — DISCHARGE SUMMARY
[facility]    Operative Note     Rosedegeoff Cesariogreta Location: OR   [facility] [id number] MRN [id number]   Admission Date 2/26/2020 Operation Date 2/26/2020   Attending Physician Janice Garcia MD Operating Physician Sara Mitchell MD      Preoperative done beyond the verumontanum. At the completion of the procedure there was a nice open channel through the urethra. The cystoscope was then removed. An 25 Bermudian coudé tipped Landeros catheter with a 5 cc balloon was then instilled into the bladder.   The p

## 2020-02-26 NOTE — ANESTHESIA POSTPROCEDURE EVALUATION
Patient: Alina Hirsch    Procedure Summary     Date:  02/26/20 Room / Location:  [facility] OR  / [facility] OR    Anesthesia Start:  0732 Anesthesia Stop:  0848    Procedures:       GREEN LIGHT LASER OF THE PROSTATE (N/A )      CYSTOSCOPY TRANSURETHRAL RE

## 2020-02-28 ENCOUNTER — TELEPHONE (OUTPATIENT)
Dept: SURGERY | Facility: CLINIC | Age: 70
End: 2020-02-28

## 2020-02-28 NOTE — TELEPHONE ENCOUNTER
Spoke with patient. Assisted in scheduling follow up visit. PT confirmed and verbalized understanding.      Future Appointments   Date Time Provider Afia Castañeda   3/12/2020  8:50 AM Devon Helton MD [facility]   7/8/2020  8:20 AM Brianna Pearson

## 2020-03-12 ENCOUNTER — OFFICE VISIT (OUTPATIENT)
Dept: SURGERY | Facility: CLINIC | Age: 70
End: 2020-03-12
Payer: MEDICARE

## 2020-03-12 VITALS — SYSTOLIC BLOOD PRESSURE: 132 MMHG | HEART RATE: 64 BPM | DIASTOLIC BLOOD PRESSURE: 84 MMHG

## 2020-03-12 DIAGNOSIS — N13.8 BPH WITH OBSTRUCTION/LOWER URINARY TRACT SYMPTOMS: Primary | ICD-10-CM

## 2020-03-12 DIAGNOSIS — N40.1 BPH WITH OBSTRUCTION/LOWER URINARY TRACT SYMPTOMS: Primary | ICD-10-CM

## 2020-03-12 PROCEDURE — 99024 POSTOP FOLLOW-UP VISIT: CPT | Performed by: UROLOGY

## 2020-03-12 PROCEDURE — G0463 HOSPITAL OUTPT CLINIC VISIT: HCPCS | Performed by: UROLOGY

## 2020-03-12 NOTE — PROGRESS NOTES
Mitzi Cifuentes is a 68year old male. HPI:   Patient presents with: Follow - Up: PT presents for follow up after procedure. PT states he still has some burning with urination at the tip of his penis. PT denies hematuria.  PT states he is still get Disorders Associated Father         alcoholism      Social History: Social History    Tobacco Use      Smoking status: Never Smoker      Smokeless tobacco: Never Used    Alcohol use:  Yes      Alcohol/week: 4.0 standard drinks      Types: 4 Glasses of wine

## 2020-03-13 RX ORDER — ALFUZOSIN HYDROCHLORIDE 10 MG/1
TABLET, EXTENDED RELEASE ORAL
Qty: 90 TABLET | Refills: 3 | Status: SHIPPED | OUTPATIENT
Start: 2020-03-13 | End: 2020-04-13

## 2020-03-13 NOTE — TELEPHONE ENCOUNTER
Pt LOV with Dr. Jerome Mcburney 3/12/2020 pt pharmacy requesting refill on alfuzosin if you agree please review and sign med, I copied and pasted part of [facility] note below. Continues on alfuzocin and finasteride.   Reports minimal dysuria which appears to be improvi

## 2020-04-02 ENCOUNTER — TELEPHONE (OUTPATIENT)
Dept: SURGERY | Facility: CLINIC | Age: 70
End: 2020-04-02

## 2020-04-02 NOTE — TELEPHONE ENCOUNTER
LMTCB also sent [facility] message. Due to the recent covid-19 crisis we are reaching out to patient if they would like to reschedule their appointment or have visit over a telephone encounter.

## 2020-04-08 NOTE — TELEPHONE ENCOUNTER
Spoke with patient regarding upcoming appt. Due to recent covid-19 virus and cdc recommendations we are offering if patients would like to reschedule appt or telephone visit. Patient states would like telephone visit.      Patients best phone number:   [phone]

## 2020-04-13 ENCOUNTER — VIRTUAL PHONE E/M (OUTPATIENT)
Dept: SURGERY | Facility: CLINIC | Age: 70
End: 2020-04-13
Payer: MEDICARE

## 2020-04-13 DIAGNOSIS — N13.8 BPH WITH OBSTRUCTION/LOWER URINARY TRACT SYMPTOMS: Primary | ICD-10-CM

## 2020-04-13 DIAGNOSIS — N40.1 BPH WITH OBSTRUCTION/LOWER URINARY TRACT SYMPTOMS: Primary | ICD-10-CM

## 2020-04-13 PROCEDURE — 99024 POSTOP FOLLOW-UP VISIT: CPT | Performed by: UROLOGY

## 2020-04-13 NOTE — PROGRESS NOTES
Virtual Telephone Check-In    Lakeisha Rodarte verbally consents to a Virtual/Telephone Check-In visit on 04/13/20.     Patient understands and accepts financial responsibility for any deductible, co-insurance and/or co-pays associated with this service

## 2020-04-14 ENCOUNTER — PATIENT MESSAGE (OUTPATIENT)
Dept: SURGERY | Facility: CLINIC | Age: 70
End: 2020-04-14

## 2020-04-15 NOTE — TELEPHONE ENCOUNTER
From: Pamela Clark  To: Eh Perez MD  Sent: 4/14/2020 4:54 PM CDT  Subject: Prescription Question    The perscription you sent for me (Fesoterodine Fumarate ER) turned out to cost over $500 copay for the 90-day supply!  This is something I adrienne

## 2020-04-24 RX ORDER — SOLIFENACIN SUCCINATE 5 MG/1
5 TABLET, FILM COATED ORAL DAILY
Qty: 30 TABLET | Refills: 11 | Status: SHIPPED | OUTPATIENT
Start: 2020-04-24 | End: 2020-07-08

## 2020-04-24 NOTE — TELEPHONE ENCOUNTER
I switch the patient from Fesoterodine Ericlorie Stockton) to Solifenacin. Cant tell how much it would cost but he should call us back if prohibitively expensive. Otherwise followup as planned. Please notify patient.

## 2020-05-14 NOTE — TELEPHONE ENCOUNTER
RN called [facility] pharmacy to clarify the status of the medication, Solifenacin prescribed in April. Per Emiliano Alonso, the pharmacist, this medication is not covered by the insurance. Asked the pharmacist if they have contacted the patient to inform this.  Pharma

## 2020-05-29 DIAGNOSIS — I10 ESSENTIAL HYPERTENSION: ICD-10-CM

## 2020-05-29 RX ORDER — LABETALOL 100 MG/1
TABLET, FILM COATED ORAL
Qty: 360 TABLET | Refills: 1 | Status: SHIPPED | OUTPATIENT
Start: 2020-05-29 | End: 2020-12-17

## 2020-05-29 RX ORDER — AMLODIPINE BESYLATE 5 MG/1
TABLET ORAL
Qty: 90 TABLET | Refills: 1 | Status: SHIPPED | OUTPATIENT
Start: 2020-05-29 | End: 2020-12-17

## 2020-05-29 RX ORDER — HYDROCHLOROTHIAZIDE 12.5 MG/1
TABLET ORAL
Qty: 90 TABLET | Refills: 1 | Status: SHIPPED | OUTPATIENT
Start: 2020-05-29 | End: 2020-12-17

## 2020-05-29 RX ORDER — LISINOPRIL 40 MG/1
TABLET ORAL
Qty: 90 TABLET | Refills: 1 | Status: SHIPPED | OUTPATIENT
Start: 2020-05-29 | End: 2020-12-17

## 2020-07-08 ENCOUNTER — OFFICE VISIT (OUTPATIENT)
Dept: INTERNAL MEDICINE CLINIC | Facility: CLINIC | Age: 70
End: 2020-07-08
Payer: MEDICARE

## 2020-07-08 VITALS
BODY MASS INDEX: 33.93 KG/M2 | WEIGHT: 256 LBS | HEART RATE: 62 BPM | RESPIRATION RATE: 16 BRPM | DIASTOLIC BLOOD PRESSURE: 78 MMHG | HEIGHT: 73 IN | SYSTOLIC BLOOD PRESSURE: 132 MMHG

## 2020-07-08 DIAGNOSIS — N40.0 BENIGN PROSTATIC HYPERPLASIA WITHOUT LOWER URINARY TRACT SYMPTOMS: ICD-10-CM

## 2020-07-08 DIAGNOSIS — I10 ESSENTIAL HYPERTENSION: ICD-10-CM

## 2020-07-08 DIAGNOSIS — E78.5 HYPERLIPIDEMIA, UNSPECIFIED HYPERLIPIDEMIA TYPE: ICD-10-CM

## 2020-07-08 DIAGNOSIS — Z00.00 ENCOUNTER FOR ANNUAL HEALTH EXAMINATION: Primary | ICD-10-CM

## 2020-07-08 DIAGNOSIS — M19.90 OSTEOARTHRITIS, UNSPECIFIED OSTEOARTHRITIS TYPE, UNSPECIFIED SITE: ICD-10-CM

## 2020-07-08 DIAGNOSIS — E55.9 VITAMIN D DEFICIENCY: ICD-10-CM

## 2020-07-08 PROCEDURE — G0439 PPPS, SUBSEQ VISIT: HCPCS | Performed by: INTERNAL MEDICINE

## 2020-07-08 NOTE — PROGRESS NOTES
HPI:   Wesley Rodriguez is a 68year old male who presents for a Medicare Subsequent Annual Wellness visit (Pt already had Initial Annual Wellness). Patient is here requesting Medicare annual wellness visit and follow-up on chronic medical issues. and Family/surrogate (if present), and forms available to patient in AVS         He has never smoked tobacco.    CAGE Alcohol screening   Wesley Rodriguez was screened for Alcohol abuse and had a score of 0 so is at low risk.      Patient Care Team: Luis Fernando Leo INFORMATION:   He  has a past medical history of Cholecystitis, Epidermal inclusion cyst, High blood pressure, High cholesterol, Osteoarthritis, Other and unspecified hyperlipidemia, Prostate enlargement, Renal cell carcinoma ([facility] [address] (2014), and Unspecified Screening Method:  Questionnaire  I have a problem hearing over the telephone:  No I have trouble following the conversations when two or more people are talking at the same time:  No   I have trouble understanding things on the TV:  No I have to strain Edema not present. Carotid bruit not present. Pulmonary/Chest: Effort normal and breath sounds normal. He has no wheezes. He has no rales. Abdominal: Soft. Bowel sounds are normal. He exhibits no mass. There is no tenderness.  Hernia confirmed negative medications. Work on diet and exercise. 3. Hyperlipidemia, unspecified hyperlipidemia type  Lipid profile in February looked good. Continue current dose of simvastatin. 4. Benign prostatic hyperplasia without lower urinary tract symptoms  Stable. applicable    Flex Sigmoidoscopy Screen every 10 years No results found for this or any previous visit. No flowsheet data found. Fecal Occult Blood Annually No results found for: FOB No flowsheet data found.     Glaucoma Screening      Ophthalmology Mery Galindo Annually No results found for: DIGOXIN, DIG, VALP No flowsheet data found.

## 2020-07-08 NOTE — PATIENT INSTRUCTIONS
Pancho De Santiago's SCREENING SCHEDULE   Tests on this list are recommended by your physician but may not be covered, or covered at this frequency, by your insurer. Please check with your insurance carrier before scheduling to verify coverage.     PREV if abnormal Colonoscopy due on 01/02/2021 Update [facility] if applicable    Flex Sigmoidoscopy Screen  Covered every 5 years No results found for this or any previous visit. No flowsheet data found.      Fecal Occult Blood   Covered Annually No res prescription benefits, but Medicare does not cover unless Medically needed    Zoster (Not covered by Medicare Part B) No orders found for this or any previous visit.  This may be covered with your pharmacy  prescription benefits     Recommended Websites for

## 2020-09-22 RX ORDER — FINASTERIDE 5 MG/1
TABLET, FILM COATED ORAL
Qty: 90 TABLET | Refills: 3 | Status: SHIPPED | OUTPATIENT
Start: 2020-09-22 | End: 2021-05-03

## 2020-09-23 ENCOUNTER — OFFICE VISIT (OUTPATIENT)
Dept: SURGERY | Facility: CLINIC | Age: 70
End: 2020-09-23
Payer: MEDICARE

## 2020-09-23 VITALS
BODY MASS INDEX: 34 KG/M2 | DIASTOLIC BLOOD PRESSURE: 82 MMHG | HEART RATE: 62 BPM | WEIGHT: 256 LBS | SYSTOLIC BLOOD PRESSURE: 151 MMHG

## 2020-09-23 DIAGNOSIS — N13.8 BPH WITH OBSTRUCTION/LOWER URINARY TRACT SYMPTOMS: Primary | ICD-10-CM

## 2020-09-23 DIAGNOSIS — N40.1 BPH WITH OBSTRUCTION/LOWER URINARY TRACT SYMPTOMS: Primary | ICD-10-CM

## 2020-09-23 PROCEDURE — 99213 OFFICE O/P EST LOW 20 MIN: CPT | Performed by: UROLOGY

## 2020-09-23 PROCEDURE — G0463 HOSPITAL OUTPT CLINIC VISIT: HCPCS | Performed by: UROLOGY

## 2020-09-23 NOTE — PROGRESS NOTES
Cecelia Perez is a 68year old male. HPI:   Patient presents with: Follow - Up: PT presents for follow up visit. PT c/o notcutria 4-5x nightly. PT denies dysuria or hematuria.        68-year-old male who I spoke with by phone visit April 13, 2020 CYSTOSCOPY TRANSURETHRAL RESECTION PROSTATE N/A 2/26/2020    Performed by Hall Runner, MD at [facility] OR   • [address] OF THE PROSTATE N/A 2/26/2020    Performed by Hall Runner, MD at [facility] OR   • KIDNEY SURGERY  2012    left partial nephr Otherwise asked the patient to call me with an update in 2 to 3 weeks. He will follow-up otherwise in 6 months. Orders This Visit:  No orders of the defined types were placed in this encounter.       Meds This Visit:  Requested Prescriptions      N

## 2020-09-28 ENCOUNTER — TELEPHONE (OUTPATIENT)
Dept: INTERNAL MEDICINE CLINIC | Facility: CLINIC | Age: 70
End: 2020-09-28

## 2020-09-28 ENCOUNTER — IMMUNIZATION (OUTPATIENT)
Dept: INTERNAL MEDICINE CLINIC | Facility: CLINIC | Age: 70
End: 2020-09-28
Payer: MEDICARE

## 2020-09-28 DIAGNOSIS — Z23 NEED FOR VACCINATION: ICD-10-CM

## 2020-09-28 PROCEDURE — 90662 IIV NO PRSV INCREASED AG IM: CPT | Performed by: INTERNAL MEDICINE

## 2020-09-28 PROCEDURE — G0008 ADMIN INFLUENZA VIRUS VAC: HCPCS | Performed by: INTERNAL MEDICINE

## 2020-09-28 NOTE — TELEPHONE ENCOUNTER
Patient was in today and is asking if he is due for a pnumonia vaccine  Dr. Karie Ward please advise   Immunization History   Administered Date(s) Administered   • FLU VAC High Dose 65 YRS & Older PRSV Free (44369) 11/28/2017, 10/19/2019, 09/28/2020   • FLUAD

## 2020-12-02 ENCOUNTER — PATIENT MESSAGE (OUTPATIENT)
Dept: SURGERY | Facility: CLINIC | Age: 70
End: 2020-12-02

## 2020-12-02 ENCOUNTER — TELEPHONE (OUTPATIENT)
Dept: GASTROENTEROLOGY | Facility: CLINIC | Age: 70
End: 2020-12-02

## 2020-12-04 NOTE — TELEPHONE ENCOUNTER
Please advise    Two questions, :  1) Should I still be taking Finasteride? You took me off . You took me off the Alfuzosin early on after surgery, and we discussed taking me off Finasteride.  I am asking because, I am told the cost of Finasteride is goi

## 2020-12-17 DIAGNOSIS — I10 ESSENTIAL HYPERTENSION: ICD-10-CM

## 2020-12-17 RX ORDER — LISINOPRIL 40 MG/1
TABLET ORAL
Qty: 90 TABLET | Refills: 3 | Status: SHIPPED | OUTPATIENT
Start: 2020-12-17 | End: 2021-12-28

## 2020-12-17 RX ORDER — HYDROCHLOROTHIAZIDE 12.5 MG/1
TABLET ORAL
Qty: 90 TABLET | Refills: 3 | Status: SHIPPED | OUTPATIENT
Start: 2020-12-17

## 2020-12-17 RX ORDER — AMLODIPINE BESYLATE 5 MG/1
TABLET ORAL
Qty: 90 TABLET | Refills: 3 | Status: SHIPPED | OUTPATIENT
Start: 2020-12-17

## 2020-12-17 RX ORDER — LABETALOL 100 MG/1
TABLET, FILM COATED ORAL
Qty: 360 TABLET | Refills: 3 | Status: SHIPPED | OUTPATIENT
Start: 2020-12-17 | End: 2021-12-28

## 2020-12-18 ENCOUNTER — TELEPHONE (OUTPATIENT)
Dept: GASTROENTEROLOGY | Facility: CLINIC | Age: 70
End: 2020-12-18

## 2020-12-18 ENCOUNTER — TELEPHONE (OUTPATIENT)
Dept: SURGERY | Facility: CLINIC | Age: 70
End: 2020-12-18

## 2020-12-18 DIAGNOSIS — Z86.010 PERSONAL HISTORY OF COLONIC POLYPS: Primary | ICD-10-CM

## 2020-12-18 NOTE — TELEPHONE ENCOUNTER
S/W pt who called because he sent a My chart msg on 12/2 and did not get a response. He asks about whether or not he is supposed to continue to take Finasteride daily because he thought SAJAN told him for 30 days after surgery.  Pt also states that the [facility]

## 2020-12-21 NOTE — TELEPHONE ENCOUNTER
JAZLYN;,  Patient received a recall letter to repeat Colon in 5 yrs. Calling to schedule. Please advise to what prep and Diagnosis,thank you    Last prep given was Suprep .   COLONOSCOPY PROCEDURE REPORT     DATE OF PROCEDURE:  1/2/2018      PCP: Manfred

## 2020-12-22 RX ORDER — SODIUM, POTASSIUM,MAG SULFATES 17.5-3.13G
SOLUTION, RECONSTITUTED, ORAL ORAL
Qty: 1 BOTTLE | Refills: 0 | Status: SHIPPED | OUTPATIENT
Start: 2020-12-22 | End: 2021-05-18 | Stop reason: ALTCHOICE

## 2020-12-22 RX ORDER — OXYBUTYNIN CHLORIDE 10 MG/1
10 TABLET, EXTENDED RELEASE ORAL DAILY
Qty: 30 TABLET | Refills: 11 | Status: SHIPPED | OUTPATIENT
Start: 2020-12-22 | End: 2021-05-03

## 2020-12-22 NOTE — TELEPHONE ENCOUNTER
Last Procedure, Date, MD:  01/02/2018, Colonoscopy Dr Konstantin Navas  Last Diagnosis: tubular adenoma    Recalled for (mth/yrs): 3 years  Sedation used previously:  IV  Last Prep Used (if known):  Suprep  Quality of prep (if known): good  Anticoagulants: no  Diabe

## 2020-12-22 NOTE — TELEPHONE ENCOUNTER
Final Diagnosis:      A. Ascending colon polyps:   · Multiple fragments of tubular adenoma.     B. Cecum polyp:   · Fragments of tubular adenoma.     C. Transverse colon polyp:   · Small superficial fragments of hyperplastic polyp.

## 2020-12-22 NOTE — TELEPHONE ENCOUNTER
He should continue on finasteride until I see the patient in March. I will start him on oxybutynin extended release 10 mg daily. Side effects include constipation and dry mouth. Please call and notify the patient.

## 2020-12-23 NOTE — TELEPHONE ENCOUNTER
GI schedulers –    Annual health visit Dr. Prabhakar Cruz 7/8/2020 reviewed.     Colonoscopy operative report 1/2/2018 reviewed    Please schedule colonoscopy exam at [facility] (Bal Rodriguez)    BMI Readings from Last 1 Encounters:  09/

## 2020-12-24 NOTE — TELEPHONE ENCOUNTER
I called pt verified name/ informed of Dr. Susi Ornelas message below, pt is to continue on finasteride until his visit in 2021 and to start the oxybutynin; pt aware of the side effects. Pt already received message from pharmacy about med.  Pt is thankfu

## 2020-12-30 NOTE — TELEPHONE ENCOUNTER
Scheduled for:  Colonoscopy - [address]27  Provider Name:  Dr. Cathrine Cranker  Date:  2/16/21  Location:  [facility]  Sedation:  MAC  Time:  12:30 pm (pt is aware to arrive at 11:30 am)  Prep:  Suprep, Prep instructions were given to pt over the phone, pt verbalized understand

## 2021-01-05 ENCOUNTER — LAB ENCOUNTER (OUTPATIENT)
Dept: LAB | Facility: HOSPITAL | Age: 71
End: 2021-01-05
Attending: INTERNAL MEDICINE
Payer: MEDICARE

## 2021-01-05 DIAGNOSIS — I10 ESSENTIAL HYPERTENSION: ICD-10-CM

## 2021-01-05 DIAGNOSIS — E55.9 VITAMIN D DEFICIENCY: ICD-10-CM

## 2021-01-05 LAB
ALBUMIN SERPL-MCNC: 4 G/DL (ref 3.4–5)
ALBUMIN/GLOB SERPL: 1 {RATIO} (ref 1–2)
ALP LIVER SERPL-CCNC: 78 U/L
ALT SERPL-CCNC: 31 U/L
ANION GAP SERPL CALC-SCNC: 6 MMOL/L (ref 0–18)
AST SERPL-CCNC: 23 U/L (ref 15–37)
BASOPHILS # BLD AUTO: 0.02 X10(3) UL (ref 0–0.2)
BASOPHILS NFR BLD AUTO: 0.3 %
BILIRUB SERPL-MCNC: 0.6 MG/DL (ref 0.1–2)
BUN BLD-MCNC: 22 MG/DL (ref 7–18)
BUN/CREAT SERPL: 18.3 (ref 10–20)
CALCIUM BLD-MCNC: 9 MG/DL (ref 8.5–10.1)
CHLORIDE SERPL-SCNC: 109 MMOL/L (ref 98–112)
CHOLEST SMN-MCNC: 122 MG/DL (ref ?–200)
CO2 SERPL-SCNC: 28 MMOL/L (ref 21–32)
CREAT BLD-MCNC: 1.2 MG/DL
DEPRECATED RDW RBC AUTO: 43 FL (ref 35.1–46.3)
EOSINOPHIL # BLD AUTO: 0.19 X10(3) UL (ref 0–0.7)
EOSINOPHIL NFR BLD AUTO: 2.8 %
ERYTHROCYTE [DISTWIDTH] IN BLOOD BY AUTOMATED COUNT: 13.5 % (ref 11–15)
GLOBULIN PLAS-MCNC: 3.9 G/DL (ref 2.8–4.4)
GLUCOSE BLD-MCNC: 96 MG/DL (ref 70–99)
HCT VFR BLD AUTO: 43.4 %
HDLC SERPL-MCNC: 39 MG/DL (ref 40–59)
HGB BLD-MCNC: 14.3 G/DL
IMM GRANULOCYTES # BLD AUTO: 0.02 X10(3) UL (ref 0–1)
IMM GRANULOCYTES NFR BLD: 0.3 %
LDLC SERPL CALC-MCNC: 64 MG/DL (ref ?–100)
LYMPHOCYTES # BLD AUTO: 1.66 X10(3) UL (ref 1–4)
LYMPHOCYTES NFR BLD AUTO: 24.7 %
M PROTEIN MFR SERPL ELPH: 7.9 G/DL (ref 6.4–8.2)
MCH RBC QN AUTO: 28.5 PG (ref 26–34)
MCHC RBC AUTO-ENTMCNC: 32.9 G/DL (ref 31–37)
MCV RBC AUTO: 86.6 FL
MONOCYTES # BLD AUTO: 0.61 X10(3) UL (ref 0.1–1)
MONOCYTES NFR BLD AUTO: 9.1 %
NEUTROPHILS # BLD AUTO: 4.23 X10 (3) UL (ref 1.5–7.7)
NEUTROPHILS # BLD AUTO: 4.23 X10(3) UL (ref 1.5–7.7)
NEUTROPHILS NFR BLD AUTO: 62.8 %
NONHDLC SERPL-MCNC: 83 MG/DL (ref ?–130)
OSMOLALITY SERPL CALC.SUM OF ELEC: 299 MOSM/KG (ref 275–295)
PATIENT FASTING Y/N/NP: YES
PATIENT FASTING Y/N/NP: YES
PLATELET # BLD AUTO: 217 10(3)UL (ref 150–450)
POTASSIUM SERPL-SCNC: 3.3 MMOL/L (ref 3.5–5.1)
RBC # BLD AUTO: 5.01 X10(6)UL
SODIUM SERPL-SCNC: 143 MMOL/L (ref 136–145)
TRIGL SERPL-MCNC: 97 MG/DL (ref 30–149)
TSI SER-ACNC: 2.55 MIU/ML (ref 0.36–3.74)
VLDLC SERPL CALC-MCNC: 19 MG/DL (ref 0–30)
WBC # BLD AUTO: 6.7 X10(3) UL (ref 4–11)

## 2021-01-05 PROCEDURE — 80053 COMPREHEN METABOLIC PANEL: CPT

## 2021-01-05 PROCEDURE — 84443 ASSAY THYROID STIM HORMONE: CPT

## 2021-01-05 PROCEDURE — 36415 COLL VENOUS BLD VENIPUNCTURE: CPT

## 2021-01-05 PROCEDURE — 85025 COMPLETE CBC W/AUTO DIFF WBC: CPT

## 2021-01-05 PROCEDURE — 80061 LIPID PANEL: CPT

## 2021-01-05 PROCEDURE — 82306 VITAMIN D 25 HYDROXY: CPT

## 2021-01-06 LAB — 25(OH)D3 SERPL-MCNC: 27.4 NG/ML (ref 30–100)

## 2021-01-11 ENCOUNTER — OFFICE VISIT (OUTPATIENT)
Dept: INTERNAL MEDICINE CLINIC | Facility: CLINIC | Age: 71
End: 2021-01-11
Payer: MEDICARE

## 2021-01-11 VITALS
DIASTOLIC BLOOD PRESSURE: 74 MMHG | WEIGHT: 260 LBS | SYSTOLIC BLOOD PRESSURE: 118 MMHG | HEART RATE: 64 BPM | HEIGHT: 73 IN | RESPIRATION RATE: 20 BRPM | BODY MASS INDEX: 34.46 KG/M2

## 2021-01-11 DIAGNOSIS — E78.5 HYPERLIPIDEMIA, UNSPECIFIED HYPERLIPIDEMIA TYPE: ICD-10-CM

## 2021-01-11 DIAGNOSIS — M19.90 OSTEOARTHRITIS, UNSPECIFIED OSTEOARTHRITIS TYPE, UNSPECIFIED SITE: ICD-10-CM

## 2021-01-11 DIAGNOSIS — E55.9 VITAMIN D DEFICIENCY: ICD-10-CM

## 2021-01-11 DIAGNOSIS — N40.0 BENIGN PROSTATIC HYPERPLASIA WITHOUT LOWER URINARY TRACT SYMPTOMS: ICD-10-CM

## 2021-01-11 DIAGNOSIS — I10 ESSENTIAL HYPERTENSION: Primary | ICD-10-CM

## 2021-01-11 DIAGNOSIS — E87.6 HYPOKALEMIA: ICD-10-CM

## 2021-01-11 PROCEDURE — 99214 OFFICE O/P EST MOD 30 MIN: CPT | Performed by: INTERNAL MEDICINE

## 2021-01-11 RX ORDER — ALFUZOSIN HYDROCHLORIDE 10 MG/1
TABLET, EXTENDED RELEASE ORAL
COMMUNITY
Start: 2020-10-29 | End: 2021-04-16

## 2021-01-11 RX ORDER — TAMSULOSIN HYDROCHLORIDE 0.4 MG/1
0.4 CAPSULE ORAL DAILY
COMMUNITY
End: 2021-01-11

## 2021-01-11 NOTE — PROGRESS NOTES
HPI:    Patient ID: Samy Jacques is a 65year old male. HPI  Patient is here for follow-up on chronic medical issues as listed below. Last seen here in July for annual wellness visit. Since that time he saw the urology doctor.   No changes made by Nany Wallace MD at [facility] MAIN OR   • KIDNEY SURGERY  2012    left partial nephrectomy   • KNEE REPLACEMENT SURGERY Left 2005    Right TKR 10/2015   • TOTAL KNEE REPLACEMENT        Family History   Problem Relation Age of Onset   • Cancer Mother    • Br Alfuzosin HCl ER 10 MG Oral Tablet 24 Hr      • Na Sulfate-K Sulfate-Mg Sulf (SUPREP BOWEL PREP KIT) 17.5-3.13-1.6 GM/177ML Oral Solution Take as directed 1 Bottle 0     Allergies:No Known Allergies     PHYSICAL EXAM:   /84 (BP Location: Left arm, Pa type  Stable on the half dose of simvastatin daily. Continue with that. 3. Benign prostatic hyperplasia without lower urinary tract symptoms  Doing reasonably well. We will see if stopping the hydrochlorothiazide is of any benefit.   Continue current m

## 2021-02-13 ENCOUNTER — LAB ENCOUNTER (OUTPATIENT)
Dept: LAB | Facility: HOSPITAL | Age: 71
End: 2021-02-13
Attending: INTERNAL MEDICINE
Payer: MEDICARE

## 2021-02-13 DIAGNOSIS — Z01.818 PRE-OP TESTING: ICD-10-CM

## 2021-02-13 LAB — SARS-COV-2 RNA RESP QL NAA+PROBE: NOT DETECTED

## 2021-02-16 ENCOUNTER — HOSPITAL ENCOUNTER (OUTPATIENT)
Facility: HOSPITAL | Age: 71
Setting detail: HOSPITAL OUTPATIENT SURGERY
Discharge: HOME | End: 2021-02-16
Attending: INTERNAL MEDICINE | Admitting: INTERNAL MEDICINE
Payer: MEDICARE

## 2021-02-16 ENCOUNTER — ANESTHESIA EVENT (OUTPATIENT)
Dept: ENDOSCOPY | Facility: HOSPITAL | Age: 71
End: 2021-02-16
Payer: MEDICARE

## 2021-02-16 ENCOUNTER — ANESTHESIA (OUTPATIENT)
Dept: ENDOSCOPY | Facility: HOSPITAL | Age: 71
End: 2021-02-16
Payer: MEDICARE

## 2021-02-16 VITALS
OXYGEN SATURATION: 96 % | WEIGHT: 256 LBS | HEART RATE: 60 BPM | HEIGHT: 73 IN | BODY MASS INDEX: 33.93 KG/M2 | DIASTOLIC BLOOD PRESSURE: 73 MMHG | SYSTOLIC BLOOD PRESSURE: 115 MMHG | TEMPERATURE: 97 F | RESPIRATION RATE: 17 BRPM

## 2021-02-16 DIAGNOSIS — Z01.818 PRE-OP TESTING: Primary | ICD-10-CM

## 2021-02-16 DIAGNOSIS — Z86.010 PERSONAL HISTORY OF COLONIC POLYPS: ICD-10-CM

## 2021-02-16 PROCEDURE — 45390 COLONOSCOPY W/RESECTION: CPT | Performed by: INTERNAL MEDICINE

## 2021-02-16 PROCEDURE — 0DBL8ZX EXCISION OF TRANSVERSE COLON, VIA NATURAL OR ARTIFICIAL OPENING ENDOSCOPIC, DIAGNOSTIC: ICD-10-PCS | Performed by: INTERNAL MEDICINE

## 2021-02-16 PROCEDURE — 45381 COLONOSCOPY SUBMUCOUS NJX: CPT | Performed by: INTERNAL MEDICINE

## 2021-02-16 PROCEDURE — 3E0H8GC INTRODUCTION OF OTHER THERAPEUTIC SUBSTANCE INTO LOWER GI, VIA NATURAL OR ARTIFICIAL OPENING ENDOSCOPIC: ICD-10-PCS | Performed by: INTERNAL MEDICINE

## 2021-02-16 RX ORDER — SODIUM CHLORIDE, SODIUM LACTATE, POTASSIUM CHLORIDE, CALCIUM CHLORIDE 600; 310; 30; 20 MG/100ML; MG/100ML; MG/100ML; MG/100ML
INJECTION, SOLUTION INTRAVENOUS CONTINUOUS
Status: DISCONTINUED | OUTPATIENT
Start: 2021-02-16 | End: 2021-02-16

## 2021-02-16 RX ORDER — NALOXONE HYDROCHLORIDE 0.4 MG/ML
80 INJECTION, SOLUTION INTRAMUSCULAR; INTRAVENOUS; SUBCUTANEOUS AS NEEDED
Status: DISCONTINUED | OUTPATIENT
Start: 2021-02-16 | End: 2021-02-16

## 2021-02-16 RX ORDER — LIDOCAINE HYDROCHLORIDE 10 MG/ML
INJECTION, SOLUTION EPIDURAL; INFILTRATION; INTRACAUDAL; PERINEURAL AS NEEDED
Status: DISCONTINUED | OUTPATIENT
Start: 2021-02-16 | End: 2021-02-16 | Stop reason: SURG

## 2021-02-16 RX ADMIN — SODIUM CHLORIDE, SODIUM LACTATE, POTASSIUM CHLORIDE, CALCIUM CHLORIDE: 600; 310; 30; 20 INJECTION, SOLUTION INTRAVENOUS at 13:25:00

## 2021-02-16 RX ADMIN — LIDOCAINE HYDROCHLORIDE 50 MG: 10 INJECTION, SOLUTION EPIDURAL; INFILTRATION; INTRACAUDAL; PERINEURAL at 12:44:00

## 2021-02-16 NOTE — H&P
History & Physical Examination    Patient Name: Wesley Rodriguez  MRN: [id number]  CSN: [id number]  YOB: 1950    Diagnosis: Personal history adenomatous colon polyps    Present Illness: 68-year-old gentleman with history hypertension, dy • COLONOSCOPY N/A 1/2/2018    Performed by Ellyn Mendiola MD at [place] [address] N/A 2/26/2020    Performed by Carmelo López MD at [facility] OR   • GREEN LIGHT LASER OF THE PROSTATE N/A 2/26/2020

## 2021-02-16 NOTE — OPERATIVE REPORT
COLONOSCOPY PROCEDURE REPORT     DATE OF PROCEDURE:  2/16/2021     PCP: Colby Benitez MD     PREOPERATIVE DIAGNOSIS:  Personal history adenomatous colon polyps     POSTOPERATIVE DIAGNOSIS:  See impression. SURGEON:  AURA [place]

## 2021-02-16 NOTE — ANESTHESIA POSTPROCEDURE EVALUATION
Patient: Krystal Arreola    Procedure Summary     Date: 02/16/21 Room / Location: [address] [place] [address] ENDOSCOPY 05 / [address] [place] [address] ENDOSCOPY    Anesthesia Start: 6877 Anesthesia Stop:     Procedure: COLONOSCOPY (N/A ) Diagnosis:       Personal history of colonic polyps      (p

## 2021-02-16 NOTE — ANESTHESIA PREPROCEDURE EVALUATION
Anesthesia PreOp Note    HPI:     Abbi Ryan is a 65year old male who presents for preoperative consultation requested by: Bárbara Lundy MD    Date of Surgery: 2/16/2021    Procedure(s):  COLONOSCOPY  Indication: Personal history of • TOTAL KNEE REPLACEMENT           •  Multiple Vitamin (MULTIVITAMIN ADULT OR), Take by mouth., Disp: , Rfl: , 2/9/2021    •  Oxybutynin Chloride ER 10 MG Oral Tablet 24 Hr, Take 1 tablet (10 mg total) by mouth daily. , Disp: 30 tablet, Rfl: 11, 2/15/2021 Tobacco Use      Smoking status: Never Smoker      Smokeless tobacco: Never Used    Substance and Sexual Activity      Alcohol use:  Yes        Alcohol/week: 4.0 standard drinks        Types: 4 Glasses of wine per week        Frequency: 2-3 times a week (H) 01/05/2021    CREATSERUM 1.20 01/05/2021    GLU 96 01/05/2021    CA 9.0 01/05/2021          Vital Signs: Body mass index is 33.78 kg/m². height is 1.854 m (6' 1\") and weight is 116.1 kg (256 lb). His oral temperature is 97.2 °F (36.2 °C).  His blood

## 2021-03-03 ENCOUNTER — TELEPHONE (OUTPATIENT)
Dept: GASTROENTEROLOGY | Facility: CLINIC | Age: 71
End: 2021-03-03

## 2021-03-03 NOTE — TELEPHONE ENCOUNTER
Results letter mailed to patient per   Colon recall entered for repeat in 3 yrs, 2/16/2024  Colon done in 2/16/2021   Updated and Patient Outreach was placed for Colon recall  Neftaly Medrano MD  P Em Gi Clinical Staff             GI R

## 2021-03-04 ENCOUNTER — PATIENT MESSAGE (OUTPATIENT)
Dept: INTERNAL MEDICINE CLINIC | Facility: CLINIC | Age: 71
End: 2021-03-04

## 2021-03-05 NOTE — TELEPHONE ENCOUNTER
From: Wesley Rodriguez  To: Marcellus Gallo MD  Sent: 3/4/2021 3:27 PM CST  Subject: Other    Correction. This is the correct COVID vaccine confirmation image.  PLease delete the previous file sent  Thanks  Serenity Moss

## 2021-04-15 ENCOUNTER — OFFICE VISIT (OUTPATIENT)
Dept: PODIATRY CLINIC | Facility: CLINIC | Age: 71
End: 2021-04-15
Payer: MEDICARE

## 2021-04-15 DIAGNOSIS — B07.0 PLANTAR WART: Primary | ICD-10-CM

## 2021-04-15 PROCEDURE — 99213 OFFICE O/P EST LOW 20 MIN: CPT | Performed by: PODIATRIST

## 2021-04-15 NOTE — PROGRESS NOTES
HPI:    Patient ID: Lukasz Wiseman is a 65year old male. This pleasant 53-year-old male presents to the office today having not been seen since October 2018.   The reason he is here is because of a painful callus on the ball of the left foot that Sharp blade excisional debridement of the area would indicate to me that this is a wart. I described and reviewed the etiology, progression, and the 2 options of care.   We elected acid applications first.  I dispensed acid I demonstrated its use and instr

## 2021-04-16 ENCOUNTER — OFFICE VISIT (OUTPATIENT)
Dept: INTERNAL MEDICINE CLINIC | Facility: CLINIC | Age: 71
End: 2021-04-16
Payer: MEDICARE

## 2021-04-16 VITALS
BODY MASS INDEX: 34.79 KG/M2 | HEIGHT: 73 IN | WEIGHT: 262.5 LBS | SYSTOLIC BLOOD PRESSURE: 130 MMHG | HEART RATE: 62 BPM | DIASTOLIC BLOOD PRESSURE: 78 MMHG | RESPIRATION RATE: 20 BRPM

## 2021-04-16 DIAGNOSIS — I10 ESSENTIAL HYPERTENSION: Primary | ICD-10-CM

## 2021-04-16 DIAGNOSIS — K13.0 CYST OF LIP: ICD-10-CM

## 2021-04-16 PROCEDURE — 99213 OFFICE O/P EST LOW 20 MIN: CPT | Performed by: INTERNAL MEDICINE

## 2021-04-16 NOTE — PROGRESS NOTES
HPI:    Patient ID: Desi Lawson is a 65year old male. HPI  Patient is here for follow-up on chronic medical issues, mainly the high blood pressure issues. Last seen here in January.   At that time we stopped the hydrochlorothiazide because of 10/2015   • TOTAL KNEE REPLACEMENT        Family History   Problem Relation Age of Onset   • Cancer Mother    • Breast Cancer Mother    • Alcohol and Other Disorders Associated Father         alcoholism      Social History    Tobacco Use      Smoking statu PREP KIT) 17.5-3.13-1.6 GM/177ML Oral Solution Take as directed 1 Bottle 0     Allergies:No Known Allergies     PHYSICAL EXAM:   /90 (BP Location: Right arm, Patient Position: Sitting, Cuff Size: large)   Pulse 62   Resp 20   Ht 6' 1\" (1.854 m)   Wt documentation.          Meds This Visit:  Requested Prescriptions      No prescriptions requested or ordered in this encounter       Imaging & Referrals:  None         Ai Monaco MD

## 2021-05-03 ENCOUNTER — OFFICE VISIT (OUTPATIENT)
Dept: SURGERY | Facility: CLINIC | Age: 71
End: 2021-05-03
Payer: MEDICARE

## 2021-05-03 VITALS
BODY MASS INDEX: 34 KG/M2 | HEART RATE: 62 BPM | DIASTOLIC BLOOD PRESSURE: 82 MMHG | SYSTOLIC BLOOD PRESSURE: 150 MMHG | WEIGHT: 260 LBS

## 2021-05-03 DIAGNOSIS — C64.2 MALIGNANT NEOPLASM OF LEFT KIDNEY (HCC): ICD-10-CM

## 2021-05-03 DIAGNOSIS — N40.1 BPH WITH OBSTRUCTION/LOWER URINARY TRACT SYMPTOMS: Primary | ICD-10-CM

## 2021-05-03 DIAGNOSIS — N13.8 BPH WITH OBSTRUCTION/LOWER URINARY TRACT SYMPTOMS: Primary | ICD-10-CM

## 2021-05-03 PROCEDURE — 99214 OFFICE O/P EST MOD 30 MIN: CPT | Performed by: UROLOGY

## 2021-05-03 RX ORDER — OXYBUTYNIN CHLORIDE 10 MG/1
10 TABLET, EXTENDED RELEASE ORAL DAILY
Qty: 90 TABLET | Refills: 3 | Status: SHIPPED | OUTPATIENT
Start: 2021-05-03 | End: 2021-10-22

## 2021-05-03 NOTE — PROGRESS NOTES
Navid Godinez is a 65year old male. HPI:   No chief complaint on file.       20-year-old male with a history of BPH status post cystoscopy and greenlight laser vaporization of the prostate February 26, 2020 continuing on finasteride 5 mg daily an Onset   • Cancer Mother    • Breast Cancer Mother    • Alcohol and Other Disorders Associated Father         alcoholism      Social History: Social History    Tobacco Use      Smoking status: Never Smoker      Smokeless tobacco: Never Used    Vaping Use the time in face-to-face discussion.          Orders This Visit:  Orders Placed This Encounter      PSA Diagnostic [E]      Urinalysis, Routine      Meds This Visit:  Requested Prescriptions     Signed Prescriptions Disp Refills   • Oxybutynin Chloride ER 1

## 2021-05-18 ENCOUNTER — OFFICE VISIT (OUTPATIENT)
Dept: PODIATRY CLINIC | Facility: CLINIC | Age: 71
End: 2021-05-18
Payer: MEDICARE

## 2021-05-18 DIAGNOSIS — B07.0 PLANTAR WART: Primary | ICD-10-CM

## 2021-05-18 PROCEDURE — 99212 OFFICE O/P EST SF 10 MIN: CPT | Performed by: PODIATRIST

## 2021-05-18 NOTE — PROGRESS NOTES
HPI:    Patient ID: Walter Jane is a 65year old male. 68-year-old male presents for follow-up in reference to wart treatment on the plantar aspect the left forefoot.   He is not aware of any discomfort and it seems that he is applying the acid an

## 2021-06-22 ENCOUNTER — OFFICE VISIT (OUTPATIENT)
Dept: PODIATRY CLINIC | Facility: CLINIC | Age: 71
End: 2021-06-22
Payer: MEDICARE

## 2021-06-22 DIAGNOSIS — B07.0 PLANTAR WART: Primary | ICD-10-CM

## 2021-06-22 PROCEDURE — 99212 OFFICE O/P EST SF 10 MIN: CPT | Performed by: PODIATRIST

## 2021-06-22 RX ORDER — FINASTERIDE 5 MG/1
TABLET, FILM COATED ORAL
COMMUNITY
Start: 2021-06-07 | End: 2021-07-30

## 2021-06-22 NOTE — PROGRESS NOTES
HPI:    Patient ID: Carrie Snyder is a 65year old male. This 76-year-old male presents for follow-up in reference to care associated with a wart on the ball of the left foot.   He states that all of his pain is gone and is struggling to isolate the

## 2021-07-30 RX ORDER — FINASTERIDE 5 MG/1
TABLET, FILM COATED ORAL
Qty: 90 TABLET | Refills: 3 | Status: SHIPPED | OUTPATIENT
Start: 2021-07-30

## 2021-08-06 ENCOUNTER — LAB ENCOUNTER (OUTPATIENT)
Dept: LAB | Facility: HOSPITAL | Age: 71
End: 2021-08-06
Attending: UROLOGY
Payer: MEDICARE

## 2021-08-06 DIAGNOSIS — E87.6 HYPOKALEMIA: ICD-10-CM

## 2021-08-06 DIAGNOSIS — N13.8 BPH WITH OBSTRUCTION/LOWER URINARY TRACT SYMPTOMS: ICD-10-CM

## 2021-08-06 DIAGNOSIS — N40.1 BPH WITH OBSTRUCTION/LOWER URINARY TRACT SYMPTOMS: ICD-10-CM

## 2021-08-06 LAB
ANION GAP SERPL CALC-SCNC: 5 MMOL/L (ref 0–18)
BILIRUB UR QL: NEGATIVE
BUN BLD-MCNC: 21 MG/DL (ref 7–18)
BUN/CREAT SERPL: 18.8 (ref 10–20)
CALCIUM BLD-MCNC: 9.5 MG/DL (ref 8.5–10.1)
CHLORIDE SERPL-SCNC: 111 MMOL/L (ref 98–112)
CLARITY UR: CLEAR
CO2 SERPL-SCNC: 28 MMOL/L (ref 21–32)
COLOR UR: YELLOW
CREAT BLD-MCNC: 1.12 MG/DL
GLUCOSE BLD-MCNC: 105 MG/DL (ref 70–99)
GLUCOSE UR-MCNC: NEGATIVE MG/DL
HGB UR QL STRIP.AUTO: NEGATIVE
KETONES UR-MCNC: NEGATIVE MG/DL
NITRITE UR QL STRIP.AUTO: NEGATIVE
OSMOLALITY SERPL CALC.SUM OF ELEC: 301 MOSM/KG (ref 275–295)
PATIENT FASTING Y/N/NP: NO
PH UR: 6 [PH] (ref 5–8)
POTASSIUM SERPL-SCNC: 3.8 MMOL/L (ref 3.5–5.1)
PROT UR-MCNC: 30 MG/DL
PSA SERPL-MCNC: 0.22 NG/ML (ref ?–4)
SODIUM SERPL-SCNC: 144 MMOL/L (ref 136–145)
SP GR UR STRIP: 1.02 (ref 1–1.03)
UROBILINOGEN UR STRIP-ACNC: <2

## 2021-08-06 PROCEDURE — 84153 ASSAY OF PSA TOTAL: CPT

## 2021-08-06 PROCEDURE — 81001 URINALYSIS AUTO W/SCOPE: CPT

## 2021-08-06 PROCEDURE — 36415 COLL VENOUS BLD VENIPUNCTURE: CPT

## 2021-08-06 PROCEDURE — 80048 BASIC METABOLIC PNL TOTAL CA: CPT

## 2021-08-11 ENCOUNTER — OFFICE VISIT (OUTPATIENT)
Dept: INTERNAL MEDICINE CLINIC | Facility: CLINIC | Age: 71
End: 2021-08-11
Payer: MEDICARE

## 2021-08-11 VITALS
BODY MASS INDEX: 34.19 KG/M2 | HEART RATE: 62 BPM | HEIGHT: 73 IN | WEIGHT: 258 LBS | RESPIRATION RATE: 20 BRPM | TEMPERATURE: 98 F | SYSTOLIC BLOOD PRESSURE: 120 MMHG | DIASTOLIC BLOOD PRESSURE: 72 MMHG

## 2021-08-11 DIAGNOSIS — I10 ESSENTIAL HYPERTENSION: ICD-10-CM

## 2021-08-11 DIAGNOSIS — E78.5 HYPERLIPIDEMIA, UNSPECIFIED HYPERLIPIDEMIA TYPE: ICD-10-CM

## 2021-08-11 DIAGNOSIS — R29.898 WEAKNESS OF BOTH LOWER EXTREMITIES: ICD-10-CM

## 2021-08-11 DIAGNOSIS — E55.9 VITAMIN D DEFICIENCY: ICD-10-CM

## 2021-08-11 DIAGNOSIS — M19.90 OSTEOARTHRITIS, UNSPECIFIED OSTEOARTHRITIS TYPE, UNSPECIFIED SITE: ICD-10-CM

## 2021-08-11 DIAGNOSIS — Z00.00 ENCOUNTER FOR ANNUAL HEALTH EXAMINATION: Primary | ICD-10-CM

## 2021-08-11 DIAGNOSIS — N40.0 BENIGN PROSTATIC HYPERPLASIA WITHOUT LOWER URINARY TRACT SYMPTOMS: ICD-10-CM

## 2021-08-11 PROCEDURE — G0439 PPPS, SUBSEQ VISIT: HCPCS | Performed by: INTERNAL MEDICINE

## 2021-08-11 NOTE — PATIENT INSTRUCTIONS
Lary De Santiago's SCREENING SCHEDULE   Tests on this list are recommended by your physician but may not be covered, or covered at this frequency, by your insurer. Please check with your insurance carrier before scheduling to verify coverage.    PRE Pneumococcal Each vaccine ([id number] & [id number]) covered once after 65 Prevnar 13: 12/28/2017    Gejrhynuz14: 10/19/2019     No recommendations at this time    Hepatitis B One screening covered for patients with certain risk factors   -  No recommend

## 2021-08-11 NOTE — PROGRESS NOTES
HPI:   Saul Gutiérrez is a 65year old male who presents for a Medicare Subsequent Annual Wellness visit (Pt already had Initial Annual Wellness). Patient is here for Medicare annual wellness visit. Last seen here in April.   Blood pressure davonte Living Will on file in [address] [place] [address] Rd.    Advance care planning including the explanation and discussion of advance directives standard forms performed Face to Face with patient and Family/surrogate (if present), and forms available to patient in AVS     He does NOT TABLET BY MOUTH ONCE DAILY  Oxybutynin Chloride ER 10 MG Oral Tablet 24 Hr, Take 1 tablet (10 mg total) by mouth daily. Multiple Vitamin (MULTIVITAMIN ADULT OR), Take by mouth.   AMLODIPINE BESYLATE 5 MG Oral Tab, TAKE 1 TABLET BY MOUTH  DAILY  LABETALOL H hearing over the telephone: No I have trouble following the conversations when two or more people are talking at the same time: No   I have trouble understanding things on the TV: No I have to strain to understand conversations: No   I have to worry about Normal heart sounds. No murmur heard. Pulmonary:      Effort: Pulmonary effort is normal.      Breath sounds: Normal breath sounds. No wheezing or rales. Abdominal:      General: Bowel sounds are normal.      Palpations: Abdomen is soft.  There is no reviewed. Overall the patient is doing well. Encouraged regular exercise and healthy diet. Maintain healthy body weight. Follow-up in 6 months. Blood work at that time.     2. Essential hypertension  Blood pressure when checked by physician today 120/7 Supplementary Documentation:   Serg De Santiago's SCREENING SCHEDULE   Tests on this list are recommended by your physician but may not be covered, or covered at this frequency, by your insurer.    Please check with your insurance carrier before yolie 09/28/2020  No recommendations at this time    Pneumococcal Each vaccine ([id number] & [id number]) covered once after 65 Prevnar 13: 12/28/2017    Fxwybbdym99: 10/19/2019     No recommendations at this time    Hepatitis B One screening covered for patients

## 2021-10-22 ENCOUNTER — PATIENT MESSAGE (OUTPATIENT)
Dept: INTERNAL MEDICINE CLINIC | Facility: CLINIC | Age: 71
End: 2021-10-22

## 2021-10-22 RX ORDER — OXYBUTYNIN CHLORIDE 10 MG/1
TABLET, EXTENDED RELEASE ORAL
Qty: 90 TABLET | Refills: 3 | Status: SHIPPED | OUTPATIENT
Start: 2021-10-22

## 2021-10-30 ENCOUNTER — PATIENT MESSAGE (OUTPATIENT)
Dept: INTERNAL MEDICINE CLINIC | Facility: CLINIC | Age: 71
End: 2021-10-30

## 2021-10-30 NOTE — TELEPHONE ENCOUNTER
From: Lukasz Wiseman  To: Max Escudero MD  Sent: 10/30/2021 11:58 AM CDT  Subject: COVID BOOSTER AND SHINGLES SHOT 1    see attached.  Moderna Booster and first shot of Shingles vaccine administered 2/26/2021  Thanks  Meghna Miller

## 2021-11-14 ENCOUNTER — NURSE TRIAGE (OUTPATIENT)
Dept: INTERNAL MEDICINE CLINIC | Facility: CLINIC | Age: 71
End: 2021-11-14

## 2021-11-15 NOTE — TELEPHONE ENCOUNTER
Action Requested: Summary for Provider     []  Critical Lab, Recommendations Needed  [] Need Additional Advice  []   FYI    []   Need Orders  [] Need Medications Sent to Pharmacy  []  Other     SUMMARY: Per protocol: OV. Dr. Flannery Nim is not available.  Appoi

## 2021-11-15 NOTE — TELEPHONE ENCOUNTER
----- Message from Andry Story sent at 11/14/2021  2:57 PM CST -----  Regarding: Boil or  cyst on back  Several years ago I had a boil or cyst on my back which was eventually removed by a surgeon in the [facility].  I believe it was infected and

## 2021-11-16 ENCOUNTER — OFFICE VISIT (OUTPATIENT)
Dept: INTERNAL MEDICINE CLINIC | Facility: CLINIC | Age: 71
End: 2021-11-16
Payer: MEDICARE

## 2021-11-16 ENCOUNTER — TELEPHONE (OUTPATIENT)
Dept: INTERNAL MEDICINE CLINIC | Facility: CLINIC | Age: 71
End: 2021-11-16

## 2021-11-16 ENCOUNTER — PATIENT MESSAGE (OUTPATIENT)
Dept: INTERNAL MEDICINE CLINIC | Facility: CLINIC | Age: 71
End: 2021-11-16

## 2021-11-16 VITALS
BODY MASS INDEX: 35.12 KG/M2 | HEIGHT: 73 IN | HEART RATE: 56 BPM | WEIGHT: 265 LBS | DIASTOLIC BLOOD PRESSURE: 93 MMHG | SYSTOLIC BLOOD PRESSURE: 170 MMHG

## 2021-11-16 DIAGNOSIS — L02.92 BOIL: Primary | ICD-10-CM

## 2021-11-16 DIAGNOSIS — L02.91 ABSCESS: ICD-10-CM

## 2021-11-16 DIAGNOSIS — Z76.89 ENCOUNTER FOR INCISION AND DRAINAGE PROCEDURE: ICD-10-CM

## 2021-11-16 PROCEDURE — 99214 OFFICE O/P EST MOD 30 MIN: CPT | Performed by: NURSE PRACTITIONER

## 2021-11-16 RX ORDER — CEPHALEXIN 500 MG/1
500 CAPSULE ORAL 3 TIMES DAILY
Qty: 30 CAPSULE | Refills: 0 | Status: SHIPPED | OUTPATIENT
Start: 2021-11-16 | End: 2021-11-26

## 2021-11-16 RX ORDER — DOXYCYCLINE 50 MG/1
50 CAPSULE ORAL 2 TIMES DAILY
Qty: 40 CAPSULE | Refills: 0 | Status: SHIPPED | OUTPATIENT
Start: 2021-11-16 | End: 2021-11-26

## 2021-11-16 NOTE — PROGRESS NOTES
HPI:    Patient ID: Carrie Snyder is a 65year old male. HPI Boil on back  65year old male who presents to the clinic with an infected, tender, erythematous,warm, fluctuant mass (boil) on his right upper back.   Patient states he has had boils be disturbance. Respiratory: Negative for cough, chest tightness and shortness of breath. Cardiovascular: Negative for chest pain, palpitations and leg swelling.    Gastrointestinal: Negative for abdominal pain, constipation, diarrhea, nausea and vomiting membrane normal.      Left Ear: Tympanic membrane normal.      Nose: Nose normal.      Mouth/Throat:      Mouth: Mucous membranes are moist.      Pharynx: No oropharyngeal exudate or posterior oropharyngeal erythema.    Eyes:      General:         Right eye 14.3 01/05/2021    HCT 43.4 01/05/2021    MCV 86.6 01/05/2021    MCH 28.5 01/05/2021    MCHC 32.9 01/05/2021    RDW 13.5 01/05/2021    .0 01/05/2021    MPV 8.1 12/30/2017      Lab Results   Component Value Date     (H) 08/06/2021    BUN 21 (H puncture wound. Your healthcare provider has drained the pus from your abscess. If the abscess pocket was large, your healthcare provider may have put in gauze packing. Your provider will need to remove or replace it on your next visit.  . You may not nee from the wound 2 days after treatment  · Fever of 100.4ºF (38ºC) or higher, or as directed by your healthcare provider  · Darolyn Genera returns when you are at home                   Other Visit Diagnoses     Boil    -  Primary    Relevant Orders    AEROBIC BACTERI

## 2021-11-16 NOTE — ASSESSMENT & PLAN NOTE
Large abscess on right upper back. A large  tender, erythematous,warm, fluctuant mass. Patient requesting I and D. Plan  1) Skin cleansed with alcohol and iodine swabs.   2) No. Blade 11 made a linear 1 and 1/2 incision   3) Manually expressed large amou antibiotics, take them as directed until they are all gone.   · You may use acetaminophen or ibuprofen to control pain, unless another pain medicine was prescribed. If you have liver disease or ever had a stomach ulcer, talk with your healthcare provider be

## 2021-11-16 NOTE — PATIENT INSTRUCTIONS
Abscess (Incision & Drainage)  An abscess is sometimes called a boil. It happens when bacteria get trapped under the skin and start to grow. Pus forms inside the abscess as the body responds to the bacteria.  An abscess can happen with an insect bite, ing infection is getting worse. The signs are listed below.    When to seek medical advice  Call your healthcare provider right away if any of these occur:  · Increasing redness or swelling  · Red streaks in the skin leading away from the wound  · Increasing lo

## 2021-11-17 NOTE — TELEPHONE ENCOUNTER
Follow up phone call. Patient states he is doing ok post procedure this morning. Minimal drainage from incision. Culture with Gram +. Plan  Doxycycline 100mg po BID x 10 days. Continue mupirocin 2% ointment TID.     Merlin Pereyra, ANP  Working with

## 2021-11-18 NOTE — TELEPHONE ENCOUNTER
From: Alice Larson  Sent: 11/17/2021 12:15 PM CST  To: Em Rn Triage  Subject: Follow up    so seems their computers were down for a bit. All good.  Picked up new MEd   Thanks

## 2021-11-21 ENCOUNTER — PATIENT MESSAGE (OUTPATIENT)
Dept: INTERNAL MEDICINE CLINIC | Facility: CLINIC | Age: 71
End: 2021-11-21

## 2021-11-22 NOTE — TELEPHONE ENCOUNTER
From: ISIAH Massey  To: Sherre Sacks  Sent: 11/16/2021 6:15 PM CST  Subject: Follow up    Would like to change your antibiotic to a stronger antibiotic. Doxycycline 100 mg po BID x 10 days.     Barbara Lujan, ANP

## 2021-11-30 ENCOUNTER — OFFICE VISIT (OUTPATIENT)
Dept: INTERNAL MEDICINE CLINIC | Facility: CLINIC | Age: 71
End: 2021-11-30
Payer: MEDICARE

## 2021-11-30 ENCOUNTER — OFFICE VISIT (OUTPATIENT)
Dept: PODIATRY CLINIC | Facility: CLINIC | Age: 71
End: 2021-11-30
Payer: MEDICARE

## 2021-11-30 VITALS
DIASTOLIC BLOOD PRESSURE: 80 MMHG | RESPIRATION RATE: 18 BRPM | HEIGHT: 73 IN | HEART RATE: 62 BPM | TEMPERATURE: 97 F | SYSTOLIC BLOOD PRESSURE: 130 MMHG | BODY MASS INDEX: 35.52 KG/M2 | WEIGHT: 268 LBS

## 2021-11-30 VITALS — BODY MASS INDEX: 35.12 KG/M2 | HEIGHT: 73 IN | WEIGHT: 265 LBS

## 2021-11-30 DIAGNOSIS — B07.0 PLANTAR WART: Primary | ICD-10-CM

## 2021-11-30 DIAGNOSIS — L02.92 BOIL: Primary | ICD-10-CM

## 2021-11-30 PROCEDURE — 99212 OFFICE O/P EST SF 10 MIN: CPT | Performed by: PODIATRIST

## 2021-11-30 PROCEDURE — 99213 OFFICE O/P EST LOW 20 MIN: CPT | Performed by: NURSE PRACTITIONER

## 2021-11-30 RX ORDER — DOXYCYCLINE 50 MG/1
50 CAPSULE ORAL 2 TIMES DAILY
Qty: 120 CAPSULE | Refills: 0 | Status: SHIPPED | OUTPATIENT
Start: 2021-11-30 | End: 2022-01-29

## 2021-11-30 NOTE — PROGRESS NOTES
HPI:    Patient ID: Wesley Rodriguez is a 65year old male. HPI Follow up on Boil on back. Follow up on lanced boil on back from 11/16/2021. Patient was changed to Doxycycline and mupirocin. Patient with much improved boil on right upper back.  He visual disturbance. Respiratory: Negative for cough, chest tightness and shortness of breath. Cardiovascular: Negative for chest pain, palpitations and leg swelling.    Gastrointestinal: Negative for abdominal pain, constipation, diarrhea, nausea and v Effort: Pulmonary effort is normal.      Breath sounds: Normal breath sounds. Abdominal:      General: Abdomen is flat. Musculoskeletal:      Cervical back: Normal range of motion and neck supple. Skin:     General: Skin is warm and dry.       Comment Lab Results   Component Value Date    TSH 2.550 01/05/2021                ASSESSMENT/PLAN:     Problem List Items Addressed This Visit        Unprioritized    Boil - Primary     Boil on back much improved. No fever, chills, or diarrhea.  Tolerated Doxy

## 2021-11-30 NOTE — PROGRESS NOTES
HPI:    Patient ID: Wesley Rodriguez is a 65year old male. This 77-year-old male presents to the office today having not been seen since June. He states that he is beginning to have recurrent painful callus on the ball of the left foot.   He feels as

## 2021-11-30 NOTE — ASSESSMENT & PLAN NOTE
Boil on back much improved. No fever, chills, or diarrhea. Tolerated Doxycycline. Plan  Wash with soap and water daily  Will continue Doxycycline 50mg po BID for two months. If patient develops diarrhea he is to stop and call.

## 2021-12-14 RX ORDER — SIMVASTATIN 40 MG
40 TABLET ORAL NIGHTLY
Qty: 90 TABLET | Refills: 1 | Status: SHIPPED | OUTPATIENT
Start: 2021-12-14

## 2021-12-14 NOTE — TELEPHONE ENCOUNTER
Refilled per [facility] protocol. Requested Prescriptions   Pending Prescriptions Disp Refills    simvastatin 40 MG Oral Tab 90 tablet 1     Sig: Take 1 tablet (40 mg total) by mouth nightly.         Cholesterol Medication Protocol Passed - 12/14/2021

## 2021-12-27 DIAGNOSIS — I10 ESSENTIAL HYPERTENSION: ICD-10-CM

## 2021-12-28 ENCOUNTER — OFFICE VISIT (OUTPATIENT)
Dept: PODIATRY CLINIC | Facility: CLINIC | Age: 71
End: 2021-12-28
Payer: MEDICARE

## 2021-12-28 DIAGNOSIS — B07.0 PLANTAR WART: Primary | ICD-10-CM

## 2021-12-28 PROCEDURE — 99212 OFFICE O/P EST SF 10 MIN: CPT | Performed by: PODIATRIST

## 2021-12-28 RX ORDER — LABETALOL 100 MG/1
TABLET, FILM COATED ORAL
Qty: 360 TABLET | Refills: 3 | Status: SHIPPED | OUTPATIENT
Start: 2021-12-28

## 2021-12-28 RX ORDER — LISINOPRIL 40 MG/1
TABLET ORAL
Qty: 90 TABLET | Refills: 3 | Status: SHIPPED | OUTPATIENT
Start: 2021-12-28

## 2021-12-28 NOTE — PROGRESS NOTES
HPI:    Patient ID: Lukasz Wiseman is a 66year old male. 75-year-old male presents for follow-up and further considerations in reference to wart treatment on the plantar aspect of the left forefoot.     ROS:              Current Outpatient Medicat

## 2022-02-01 ENCOUNTER — OFFICE VISIT (OUTPATIENT)
Dept: PODIATRY CLINIC | Facility: CLINIC | Age: 72
End: 2022-02-01
Payer: MEDICARE

## 2022-02-01 DIAGNOSIS — B07.0 PLANTAR WART: Primary | ICD-10-CM

## 2022-02-01 PROCEDURE — 99212 OFFICE O/P EST SF 10 MIN: CPT | Performed by: PODIATRIST

## 2022-02-10 ENCOUNTER — LAB ENCOUNTER (OUTPATIENT)
Dept: LAB | Facility: HOSPITAL | Age: 72
End: 2022-02-10
Attending: INTERNAL MEDICINE
Payer: MEDICARE

## 2022-02-10 DIAGNOSIS — E55.9 VITAMIN D DEFICIENCY: ICD-10-CM

## 2022-02-10 DIAGNOSIS — I10 ESSENTIAL HYPERTENSION: ICD-10-CM

## 2022-02-10 LAB
ALBUMIN SERPL-MCNC: 4 G/DL (ref 3.4–5)
ALBUMIN/GLOB SERPL: 1.3 {RATIO} (ref 1–2)
ALP LIVER SERPL-CCNC: 75 U/L
ALT SERPL-CCNC: 28 U/L
ANION GAP SERPL CALC-SCNC: 7 MMOL/L (ref 0–18)
AST SERPL-CCNC: 20 U/L (ref 15–37)
BASOPHILS # BLD AUTO: 0.02 X10(3) UL (ref 0–0.2)
BASOPHILS NFR BLD AUTO: 0.3 %
BILIRUB SERPL-MCNC: 0.8 MG/DL (ref 0.1–2)
BUN BLD-MCNC: 21 MG/DL (ref 7–18)
BUN/CREAT SERPL: 20 (ref 10–20)
CALCIUM BLD-MCNC: 9.1 MG/DL (ref 8.5–10.1)
CHLORIDE SERPL-SCNC: 109 MMOL/L (ref 98–112)
CHOLEST SERPL-MCNC: 109 MG/DL (ref ?–200)
CO2 SERPL-SCNC: 29 MMOL/L (ref 21–32)
CREAT BLD-MCNC: 1.05 MG/DL
DEPRECATED RDW RBC AUTO: 45.1 FL (ref 35.1–46.3)
EOSINOPHIL NFR BLD AUTO: 2.1 %
ERYTHROCYTE [DISTWIDTH] IN BLOOD BY AUTOMATED COUNT: 14 % (ref 11–15)
FASTING PATIENT LIPID ANSWER: YES
FASTING STATUS PATIENT QL REPORTED: YES
GLOBULIN PLAS-MCNC: 3.2 G/DL (ref 2.8–4.4)
GLUCOSE BLD-MCNC: 97 MG/DL (ref 70–99)
HCT VFR BLD AUTO: 44 %
HDLC SERPL-MCNC: 38 MG/DL (ref 40–59)
HGB BLD-MCNC: 14.3 G/DL
IMM GRANULOCYTES # BLD AUTO: 0.02 X10(3) UL (ref 0–1)
IMM GRANULOCYTES NFR BLD: 0.3 %
LDLC SERPL CALC-MCNC: 56 MG/DL (ref ?–100)
LYMPHOCYTES # BLD AUTO: 1.75 X10(3) UL (ref 1–4)
LYMPHOCYTES NFR BLD AUTO: 26.7 %
MCH RBC QN AUTO: 28.9 PG (ref 26–34)
MCHC RBC AUTO-ENTMCNC: 32.5 G/DL (ref 31–37)
MCV RBC AUTO: 88.9 FL
MONOCYTES NFR BLD AUTO: 9.9 %
NEUTROPHILS # BLD AUTO: 3.98 X10 (3) UL (ref 1.5–7.7)
NEUTROPHILS # BLD AUTO: 3.98 X10(3) UL (ref 1.5–7.7)
NEUTROPHILS NFR BLD AUTO: 60.7 %
NONHDLC SERPL-MCNC: 71 MG/DL (ref ?–130)
OSMOLALITY SERPL CALC.SUM OF ELEC: 303 MOSM/KG (ref 275–295)
PLATELET # BLD AUTO: 202 10(3)UL (ref 150–450)
POTASSIUM SERPL-SCNC: 3.9 MMOL/L (ref 3.5–5.1)
PROT SERPL-MCNC: 7.2 G/DL (ref 6.4–8.2)
RBC # BLD AUTO: 4.95 X10(6)UL
SODIUM SERPL-SCNC: 145 MMOL/L (ref 136–145)
TRIGL SERPL-MCNC: 70 MG/DL (ref 30–149)
TSI SER-ACNC: 1.83 MIU/ML (ref 0.36–3.74)
VIT B12 SERPL-MCNC: 324 PG/ML (ref 193–986)
VIT D+METAB SERPL-MCNC: 28.2 NG/ML (ref 30–100)
VLDLC SERPL CALC-MCNC: 10 MG/DL (ref 0–30)
WBC # BLD AUTO: 6.6 X10(3) UL (ref 4–11)

## 2022-02-10 PROCEDURE — 36415 COLL VENOUS BLD VENIPUNCTURE: CPT

## 2022-02-10 PROCEDURE — 82607 VITAMIN B-12: CPT

## 2022-02-10 PROCEDURE — 80053 COMPREHEN METABOLIC PANEL: CPT

## 2022-02-10 PROCEDURE — 85025 COMPLETE CBC W/AUTO DIFF WBC: CPT

## 2022-02-10 PROCEDURE — 84443 ASSAY THYROID STIM HORMONE: CPT

## 2022-02-10 PROCEDURE — 82306 VITAMIN D 25 HYDROXY: CPT

## 2022-02-10 PROCEDURE — 80061 LIPID PANEL: CPT

## 2022-02-14 ENCOUNTER — OFFICE VISIT (OUTPATIENT)
Dept: INTERNAL MEDICINE CLINIC | Facility: CLINIC | Age: 72
End: 2022-02-14
Payer: MEDICARE

## 2022-02-14 VITALS
RESPIRATION RATE: 20 BRPM | TEMPERATURE: 98 F | SYSTOLIC BLOOD PRESSURE: 130 MMHG | BODY MASS INDEX: 35.12 KG/M2 | HEIGHT: 73 IN | DIASTOLIC BLOOD PRESSURE: 76 MMHG | WEIGHT: 265 LBS

## 2022-02-14 DIAGNOSIS — E78.5 HYPERLIPIDEMIA, UNSPECIFIED HYPERLIPIDEMIA TYPE: ICD-10-CM

## 2022-02-14 DIAGNOSIS — E55.9 VITAMIN D DEFICIENCY: ICD-10-CM

## 2022-02-14 DIAGNOSIS — Z23 NEED FOR VACCINATION: ICD-10-CM

## 2022-02-14 DIAGNOSIS — I10 ESSENTIAL HYPERTENSION: Primary | ICD-10-CM

## 2022-02-14 DIAGNOSIS — N40.0 BENIGN PROSTATIC HYPERPLASIA WITHOUT LOWER URINARY TRACT SYMPTOMS: ICD-10-CM

## 2022-02-14 PROCEDURE — 90715 TDAP VACCINE 7 YRS/> IM: CPT | Performed by: INTERNAL MEDICINE

## 2022-02-14 PROCEDURE — 99214 OFFICE O/P EST MOD 30 MIN: CPT | Performed by: INTERNAL MEDICINE

## 2022-02-14 PROCEDURE — 90471 IMMUNIZATION ADMIN: CPT | Performed by: INTERNAL MEDICINE

## 2022-02-14 RX ORDER — AMLODIPINE BESYLATE 10 MG/1
10 TABLET ORAL DAILY
Qty: 90 TABLET | Refills: 1 | Status: SHIPPED | OUTPATIENT
Start: 2022-02-14

## 2022-03-01 ENCOUNTER — OFFICE VISIT (OUTPATIENT)
Dept: PODIATRY CLINIC | Facility: CLINIC | Age: 72
End: 2022-03-01
Payer: MEDICARE

## 2022-03-01 DIAGNOSIS — B07.0 PLANTAR WART: Primary | ICD-10-CM

## 2022-03-01 PROCEDURE — 99212 OFFICE O/P EST SF 10 MIN: CPT | Performed by: PODIATRIST

## 2022-03-13 DIAGNOSIS — I10 ESSENTIAL HYPERTENSION: ICD-10-CM

## 2022-03-14 RX ORDER — HYDROCHLOROTHIAZIDE 12.5 MG/1
12.5 TABLET ORAL DAILY
Qty: 90 TABLET | Refills: 1 | Status: SHIPPED | OUTPATIENT
Start: 2022-03-14 | End: 2022-08-31

## 2022-03-14 NOTE — TELEPHONE ENCOUNTER
Refill passed per [facility] protocol.     Requested Prescriptions   Pending Prescriptions Disp Refills    HYDROCHLOROTHIAZIDE 12.5 MG Oral Tab [Pharmacy Med Name: hydroCHLOROthiazide 12.5 MG Oral Tablet] 90 tablet 3     Sig: TAKE 1 TABLET BY MOUTH ONCE DAILY        Hypertensive Medications Protocol Passed - 3/13/2022 10:03 PM        Passed - CMP or BMP in past 12 months        Passed - Appointment in past 6 or next 3 months        Passed - GFR Non- > 50     Lab Results   Component Value Date    GFRNAA 70 02/10/2022                       Future Appointments         Provider Department Appt Notes    In 4 weeks Angeles De La Cruz [facility], [address], [place]     In 1 month [facility], [address] [place] [address], [facility] 1 yr follow up    In 5 months Richard Acosta MD [facility], [address], [address] Visits              1 week ago Plantar wart [facility], [address], [address], [facility], DPM    Office Visit    4 weeks ago Essential hypertension    [facility], [address], Maria Guadalupe Mcfarlane MD    Office Visit    1 month ago Plantar wart [facility], [address], [address], [facility], DPM    Office Visit    2 months ago Plantar wart [facility], [address], [address], [facility], DPM    Office Visit    3 months ago Plantar wart [facility], [address], [address], [facility], [place]    Office Visit

## 2022-04-12 ENCOUNTER — OFFICE VISIT (OUTPATIENT)
Dept: PODIATRY CLINIC | Facility: CLINIC | Age: 72
End: 2022-04-12
Payer: MEDICARE

## 2022-04-12 DIAGNOSIS — B07.0 PLANTAR WART: Primary | ICD-10-CM

## 2022-04-12 PROCEDURE — 99212 OFFICE O/P EST SF 10 MIN: CPT | Performed by: PODIATRIST

## 2022-04-25 NOTE — TELEPHONE ENCOUNTER
Called no answer, no  V/m came on Hatchet Flap Text: The defect edges were debeveled with a #15 scalpel blade.  Given the location of the defect, shape of the defect and the proximity to free margins a hatchet flap was deemed most appropriate.  Using a sterile surgical marker, an appropriate hatchet flap was drawn incorporating the defect and placing the expected incisions within the relaxed skin tension lines where possible.    The area thus outlined was incised deep to adipose tissue with a #15 scalpel blade.  The skin margins were undermined to an appropriate distance in all directions utilizing iris scissors.

## 2022-05-03 ENCOUNTER — OFFICE VISIT (OUTPATIENT)
Dept: SURGERY | Facility: CLINIC | Age: 72
End: 2022-05-03
Payer: MEDICARE

## 2022-05-03 ENCOUNTER — LAB ENCOUNTER (OUTPATIENT)
Dept: LAB | Facility: HOSPITAL | Age: 72
End: 2022-05-03
Attending: UROLOGY
Payer: MEDICARE

## 2022-05-03 DIAGNOSIS — N40.1 BPH WITH OBSTRUCTION/LOWER URINARY TRACT SYMPTOMS: Primary | ICD-10-CM

## 2022-05-03 DIAGNOSIS — N13.8 BPH WITH OBSTRUCTION/LOWER URINARY TRACT SYMPTOMS: Primary | ICD-10-CM

## 2022-05-03 DIAGNOSIS — N40.1 BPH WITH OBSTRUCTION/LOWER URINARY TRACT SYMPTOMS: ICD-10-CM

## 2022-05-03 DIAGNOSIS — N13.8 BPH WITH OBSTRUCTION/LOWER URINARY TRACT SYMPTOMS: ICD-10-CM

## 2022-05-03 LAB — PSA SERPL-MCNC: 0.27 NG/ML (ref ?–4)

## 2022-05-03 PROCEDURE — 84153 ASSAY OF PSA TOTAL: CPT

## 2022-05-03 PROCEDURE — 99214 OFFICE O/P EST MOD 30 MIN: CPT | Performed by: UROLOGY

## 2022-05-03 PROCEDURE — 36415 COLL VENOUS BLD VENIPUNCTURE: CPT

## 2022-05-03 RX ORDER — FINASTERIDE 5 MG/1
5 TABLET, FILM COATED ORAL DAILY
Qty: 90 TABLET | Refills: 3 | Status: SHIPPED | OUTPATIENT
Start: 2022-05-03

## 2022-05-03 RX ORDER — OXYBUTYNIN CHLORIDE 10 MG/1
10 TABLET, EXTENDED RELEASE ORAL DAILY
Qty: 90 TABLET | Refills: 3 | Status: SHIPPED | OUTPATIENT
Start: 2022-05-03

## 2022-05-13 RX ORDER — SIMVASTATIN 40 MG
40 TABLET ORAL NIGHTLY
Qty: 90 TABLET | Refills: 1 | Status: SHIPPED | OUTPATIENT
Start: 2022-05-13 | End: 2023-01-22

## 2022-05-14 NOTE — TELEPHONE ENCOUNTER
Refill passed per [facility] protocol.      Requested Prescriptions   Pending Prescriptions Disp Refills    SIMVASTATIN 40 MG Oral Tab [Pharmacy Med Name: Simvastatin 40 MG Oral Tablet] 90 tablet 3     Sig: TAKE 1 TABLET BY MOUTH AT  NIGHT        Cholesterol Medication Protocol Passed - 5/13/2022  9:52 PM        Passed - ALT in past 12 months        Passed - LDL in past 12 months        Passed - Last ALT < 80       Lab Results   Component Value Date    ALT 28 02/10/2022             Passed - Last LDL < 130     Lab Results   Component Value Date    LDL 56 02/10/2022               Passed - Appointment in past 12 or next 3 months               Recent Outpatient Visits              1 week ago BPH with obstruction/lower urinary tract symptoms    [facility], Karla Frank MD    Office Visit    1 month ago Plantar wart [facility], [place], [address] [facility], [facility]    Office Visit    2 months ago Plantar wart [facility], [place], [address], [place]ossie Monty, [facility]    Office Visit    2 months ago Essential hypertension    [facility] Denys Frank Carmella Maxim, MD    Office Visit    3 months ago Plantar wart [facility], [place], [address], [place], [place]    Office Visit             Future Appointments         Provider Department Appt Notes    In 1 week Pearlie Lesch, MD [facility], [place], [address], REPLACEMENT 2005    In 3 months Faiht Donaldson MD [facility], [address]     In 11 months Esther Saleh [facility], [address] 1 yr follow up

## 2022-05-26 ENCOUNTER — OFFICE VISIT (OUTPATIENT)
Dept: ORTHOPEDICS CLINIC | Facility: CLINIC | Age: 72
End: 2022-05-26
Payer: MEDICARE

## 2022-05-26 ENCOUNTER — HOSPITAL ENCOUNTER (OUTPATIENT)
Dept: GENERAL RADIOLOGY | Facility: HOSPITAL | Age: 72
Discharge: HOME | End: 2022-05-26
Attending: ORTHOPAEDIC SURGERY
Payer: MEDICARE

## 2022-05-26 DIAGNOSIS — M25.561 CHRONIC PAIN OF BOTH KNEES: ICD-10-CM

## 2022-05-26 DIAGNOSIS — G89.29 CHRONIC PAIN OF BOTH KNEES: ICD-10-CM

## 2022-05-26 DIAGNOSIS — M25.562 CHRONIC PAIN OF BOTH KNEES: ICD-10-CM

## 2022-05-26 DIAGNOSIS — M25.362 INSTABILITY OF LEFT KNEE JOINT: ICD-10-CM

## 2022-05-26 DIAGNOSIS — M25.462 KNEE EFFUSION, LEFT: ICD-10-CM

## 2022-05-26 DIAGNOSIS — G89.29 CHRONIC PAIN OF BOTH KNEES: Primary | ICD-10-CM

## 2022-05-26 DIAGNOSIS — M25.561 CHRONIC PAIN OF BOTH KNEES: Primary | ICD-10-CM

## 2022-05-26 DIAGNOSIS — M25.562 CHRONIC PAIN OF BOTH KNEES: Primary | ICD-10-CM

## 2022-05-26 PROCEDURE — 73562 X-RAY EXAM OF KNEE 3: CPT | Performed by: ORTHOPAEDIC SURGERY

## 2022-05-26 PROCEDURE — 99214 OFFICE O/P EST MOD 30 MIN: CPT | Performed by: ORTHOPAEDIC SURGERY

## 2022-06-14 ENCOUNTER — NURSE TRIAGE (OUTPATIENT)
Dept: INTERNAL MEDICINE CLINIC | Facility: CLINIC | Age: 72
End: 2022-06-14

## 2022-06-14 ENCOUNTER — APPOINTMENT (OUTPATIENT)
Dept: CT IMAGING | Facility: HOSPITAL | Age: 72
End: 2022-06-14
Attending: EMERGENCY MEDICINE
Payer: MEDICARE

## 2022-06-14 ENCOUNTER — HOSPITAL ENCOUNTER (EMERGENCY)
Facility: HOSPITAL | Age: 72
Discharge: HOME | End: 2022-06-14
Attending: EMERGENCY MEDICINE
Payer: MEDICARE

## 2022-06-14 VITALS
SYSTOLIC BLOOD PRESSURE: 136 MMHG | BODY MASS INDEX: 33.8 KG/M2 | HEART RATE: 63 BPM | WEIGHT: 255 LBS | HEIGHT: 73 IN | RESPIRATION RATE: 18 BRPM | OXYGEN SATURATION: 97 % | DIASTOLIC BLOOD PRESSURE: 75 MMHG | TEMPERATURE: 98 F

## 2022-06-14 DIAGNOSIS — K57.92 ACUTE DIVERTICULITIS: Primary | ICD-10-CM

## 2022-06-14 LAB
ANION GAP SERPL CALC-SCNC: 9 MMOL/L (ref 0–18)
BASOPHILS # BLD AUTO: 0.02 X10(3) UL (ref 0–0.2)
BASOPHILS NFR BLD AUTO: 0.2 %
BILIRUB UR QL: NEGATIVE
BUN BLD-MCNC: 21 MG/DL (ref 7–18)
BUN/CREAT SERPL: 20.6 (ref 10–20)
CALCIUM BLD-MCNC: 9.2 MG/DL (ref 8.5–10.1)
CHLORIDE SERPL-SCNC: 109 MMOL/L (ref 98–112)
CLARITY UR: CLEAR
CO2 SERPL-SCNC: 24 MMOL/L (ref 21–32)
COLOR UR: YELLOW
CREAT BLD-MCNC: 1.02 MG/DL
DEPRECATED RDW RBC AUTO: 43.7 FL (ref 35.1–46.3)
EOSINOPHIL # BLD AUTO: 0.03 X10(3) UL (ref 0–0.7)
EOSINOPHIL NFR BLD AUTO: 0.2 %
ERYTHROCYTE [DISTWIDTH] IN BLOOD BY AUTOMATED COUNT: 13.6 % (ref 11–15)
GLUCOSE BLD-MCNC: 131 MG/DL (ref 70–99)
GLUCOSE UR-MCNC: NEGATIVE MG/DL
HCT VFR BLD AUTO: 42 %
HGB BLD-MCNC: 14 G/DL
HGB UR QL STRIP.AUTO: NEGATIVE
IMM GRANULOCYTES # BLD AUTO: 0.1 X10(3) UL (ref 0–1)
IMM GRANULOCYTES NFR BLD: 0.8 %
KETONES UR-MCNC: NEGATIVE MG/DL
LEUKOCYTE ESTERASE UR QL STRIP.AUTO: NEGATIVE
LYMPHOCYTES # BLD AUTO: 0.86 X10(3) UL (ref 1–4)
LYMPHOCYTES NFR BLD AUTO: 6.5 %
MCH RBC QN AUTO: 29 PG (ref 26–34)
MCHC RBC AUTO-ENTMCNC: 33.3 G/DL (ref 31–37)
MCV RBC AUTO: 87 FL
MONOCYTES # BLD AUTO: 0.82 X10(3) UL (ref 0.1–1)
MONOCYTES NFR BLD AUTO: 6.2 %
NEUTROPHILS # BLD AUTO: 11.5 X10 (3) UL (ref 1.5–7.7)
NEUTROPHILS # BLD AUTO: 11.5 X10(3) UL (ref 1.5–7.7)
NEUTROPHILS NFR BLD AUTO: 86.1 %
NITRITE UR QL STRIP.AUTO: NEGATIVE
OSMOLALITY SERPL CALC.SUM OF ELEC: 299 MOSM/KG (ref 275–295)
PH UR: 6 [PH] (ref 5–8)
PLATELET # BLD AUTO: 194 10(3)UL (ref 150–450)
POTASSIUM SERPL-SCNC: 3.1 MMOL/L (ref 3.5–5.1)
PROT UR-MCNC: NEGATIVE MG/DL
RBC # BLD AUTO: 4.83 X10(6)UL
SODIUM SERPL-SCNC: 142 MMOL/L (ref 136–145)
SP GR UR STRIP: 1.02 (ref 1–1.03)
UROBILINOGEN UR STRIP-ACNC: <2
VIT C UR-MCNC: NEGATIVE MG/DL
WBC # BLD AUTO: 13.3 X10(3) UL (ref 4–11)

## 2022-06-14 PROCEDURE — 85025 COMPLETE CBC W/AUTO DIFF WBC: CPT | Performed by: EMERGENCY MEDICINE

## 2022-06-14 PROCEDURE — 96361 HYDRATE IV INFUSION ADD-ON: CPT

## 2022-06-14 PROCEDURE — 96374 THER/PROPH/DIAG INJ IV PUSH: CPT

## 2022-06-14 PROCEDURE — 74176 CT ABD & PELVIS W/O CONTRAST: CPT | Performed by: EMERGENCY MEDICINE

## 2022-06-14 PROCEDURE — 81003 URINALYSIS AUTO W/O SCOPE: CPT | Performed by: EMERGENCY MEDICINE

## 2022-06-14 PROCEDURE — 80048 BASIC METABOLIC PNL TOTAL CA: CPT | Performed by: EMERGENCY MEDICINE

## 2022-06-14 PROCEDURE — 99284 EMERGENCY DEPT VISIT MOD MDM: CPT

## 2022-06-14 RX ORDER — SODIUM CHLORIDE 9 MG/ML
INJECTION, SOLUTION INTRAVENOUS CONTINUOUS
Status: DISCONTINUED | OUTPATIENT
Start: 2022-06-14 | End: 2022-06-14

## 2022-06-14 RX ORDER — AMOXICILLIN AND CLAVULANATE POTASSIUM 875; 125 MG/1; MG/1
1 TABLET, FILM COATED ORAL 2 TIMES DAILY
Qty: 20 TABLET | Refills: 0 | Status: SHIPPED | OUTPATIENT
Start: 2022-06-14 | End: 2022-06-24

## 2022-06-14 RX ORDER — KETOROLAC TROMETHAMINE 15 MG/ML
15 INJECTION, SOLUTION INTRAMUSCULAR; INTRAVENOUS ONCE
Status: COMPLETED | OUTPATIENT
Start: 2022-06-14 | End: 2022-06-14

## 2022-06-14 RX ORDER — SACCHAROMYCES BOULARDII 250 MG
250 CAPSULE ORAL 2 TIMES DAILY
Qty: 28 CAPSULE | Refills: 0 | Status: SHIPPED | OUTPATIENT
Start: 2022-06-14 | End: 2022-06-28

## 2022-06-14 RX ORDER — ACETAMINOPHEN AND CODEINE PHOSPHATE 300; 30 MG/1; MG/1
1 TABLET ORAL EVERY 6 HOURS PRN
Qty: 15 TABLET | Refills: 0 | Status: SHIPPED | OUTPATIENT
Start: 2022-06-14 | End: 2022-06-19

## 2022-06-30 ENCOUNTER — OFFICE VISIT (OUTPATIENT)
Dept: GASTROENTEROLOGY | Facility: CLINIC | Age: 72
End: 2022-06-30
Payer: MEDICARE

## 2022-06-30 VITALS
HEART RATE: 98 BPM | HEIGHT: 73 IN | WEIGHT: 256 LBS | BODY MASS INDEX: 33.93 KG/M2 | SYSTOLIC BLOOD PRESSURE: 168 MMHG | DIASTOLIC BLOOD PRESSURE: 93 MMHG

## 2022-06-30 DIAGNOSIS — Z87.19 HISTORY OF COLONIC DIVERTICULITIS: Primary | ICD-10-CM

## 2022-06-30 PROCEDURE — 99214 OFFICE O/P EST MOD 30 MIN: CPT | Performed by: NURSE PRACTITIONER

## 2022-06-30 RX ORDER — AMLODIPINE BESYLATE 10 MG/1
TABLET ORAL
Qty: 90 TABLET | Refills: 3 | Status: SHIPPED | OUTPATIENT
Start: 2022-06-30

## 2022-06-30 NOTE — PATIENT INSTRUCTIONS
-Low fiber diet for the next couple of weeks and gradually increase fiber  -Follow-up with new or worsening symptoms

## 2022-08-17 ENCOUNTER — HOSPITAL ENCOUNTER (OUTPATIENT)
Dept: GENERAL RADIOLOGY | Facility: HOSPITAL | Age: 72
Discharge: HOME | End: 2022-08-17
Attending: INTERNAL MEDICINE
Payer: MEDICARE

## 2022-08-17 ENCOUNTER — OFFICE VISIT (OUTPATIENT)
Dept: INTERNAL MEDICINE CLINIC | Facility: CLINIC | Age: 72
End: 2022-08-17
Payer: MEDICARE

## 2022-08-17 VITALS
BODY MASS INDEX: 35.12 KG/M2 | DIASTOLIC BLOOD PRESSURE: 68 MMHG | RESPIRATION RATE: 20 BRPM | SYSTOLIC BLOOD PRESSURE: 122 MMHG | HEART RATE: 66 BPM | HEIGHT: 73 IN | TEMPERATURE: 98 F | WEIGHT: 265 LBS

## 2022-08-17 DIAGNOSIS — R05.9 COUGH, UNSPECIFIED TYPE: ICD-10-CM

## 2022-08-17 DIAGNOSIS — Z85.528 HISTORY OF RENAL CELL CARCINOMA: ICD-10-CM

## 2022-08-17 DIAGNOSIS — Z00.00 ENCOUNTER FOR ANNUAL HEALTH EXAMINATION: Primary | ICD-10-CM

## 2022-08-17 DIAGNOSIS — E78.5 HYPERLIPIDEMIA, UNSPECIFIED HYPERLIPIDEMIA TYPE: ICD-10-CM

## 2022-08-17 DIAGNOSIS — N40.0 BENIGN PROSTATIC HYPERPLASIA WITHOUT LOWER URINARY TRACT SYMPTOMS: ICD-10-CM

## 2022-08-17 DIAGNOSIS — I10 ESSENTIAL HYPERTENSION: ICD-10-CM

## 2022-08-17 DIAGNOSIS — K57.90 DIVERTICULOSIS: ICD-10-CM

## 2022-08-17 DIAGNOSIS — R93.429 ABNORMAL CT SCAN, KIDNEY: ICD-10-CM

## 2022-08-17 DIAGNOSIS — M19.90 OSTEOARTHRITIS, UNSPECIFIED OSTEOARTHRITIS TYPE, UNSPECIFIED SITE: ICD-10-CM

## 2022-08-17 PROCEDURE — 1125F AMNT PAIN NOTED PAIN PRSNT: CPT | Performed by: INTERNAL MEDICINE

## 2022-08-17 PROCEDURE — G0439 PPPS, SUBSEQ VISIT: HCPCS | Performed by: INTERNAL MEDICINE

## 2022-08-17 PROCEDURE — 71046 X-RAY EXAM CHEST 2 VIEWS: CPT | Performed by: INTERNAL MEDICINE

## 2022-08-17 PROCEDURE — 99213 OFFICE O/P EST LOW 20 MIN: CPT | Performed by: INTERNAL MEDICINE

## 2022-08-30 DIAGNOSIS — I10 ESSENTIAL HYPERTENSION: ICD-10-CM

## 2022-08-31 RX ORDER — HYDROCHLOROTHIAZIDE 12.5 MG/1
12.5 TABLET ORAL DAILY
Qty: 90 TABLET | Refills: 1 | Status: SHIPPED | OUTPATIENT
Start: 2022-08-31

## 2022-08-31 NOTE — TELEPHONE ENCOUNTER
Refill passed per [facility] protocol   Requested Prescriptions   Pending Prescriptions Disp Refills    HYDROCHLOROTHIAZIDE 12.5 MG Oral Tab [Pharmacy Med Name: hydroCHLOROthiazide 12.5 MG Oral Tablet] 90 tablet 3     Sig: TAKE 1 TABLET BY MOUTH  DAILY        Hypertensive Medications Protocol Passed - 8/30/2022 10:23 PM        Passed - In person appointment in the past 12 or next 3 months       Recent Outpatient Visits              2 weeks ago Encounter for annual health examination    Oliva Mariano MD    Office Visit    2 months ago History of colonic diverticulitis    [address], [address] [facility], [place], ISIAH Hendricks    Office Visit    3 months ago Chronic pain of both knees    [facility] for [address], [place], Karen Godinez MD    Office Visit    4 months ago BPH with obstruction/lower urinary tract symptoms    [facility], [place], Christian Joaquin MD    Office Visit    4 months ago Plantar wart    [facility], [place], [address] [facility], [place], [place]    Office Visit     Future Appointments         Provider Department Appt Notes    In 1 week [facility] MR RM3 ([address]) [facility] MRI     In 1 month Juan Jose Velazquez MD [facility], [facility] left knee still swollen and [place]    In 5 months Flakita De La Rosa MD [facility], [facility] 6 mo f/u    In 8 months Richard Lindquist, [address] [place] [address], [address] [facility] 1 yr follow up               Passed - Last BP reading less than 140/90     BP Readings from Last 1 Encounters:  08/17/22 : 122/68                Passed - CMP or BMP in past 6 months     Recent Results (from the past 4392 hour(s))   Basic Metabolic Panel (8)    Collection Time: 06/14/22 11:22 AM   Result Value Ref Range    Glucose 131 (H) 70 - 99 mg/dL    Sodium 142 136 - 145 mmol/L Potassium 3.1 (L) 3.5 - 5.1 mmol/L    Chloride 109 98 - 112 mmol/L    CO2 24.0 21.0 - 32.0 mmol/L    Anion Gap 9 0 - 18 mmol/L    BUN 21 (H) 7 - 18 mg/dL    Creatinine 1.02 0.70 - 1.30 mg/dL    BUN/CREA Ratio 20.6 (H) 10.0 - 20.0    Calcium, Total 9.2 8.5 - 10.1 mg/dL    Calculated Osmolality 299 (H) 275 - 295 mOsm/kg    GFR, Non- 73 >=60    GFR, -American 84 >=60     *Note: Due to a large number of results and/or encounters for the requested time period, some results have not been displayed. A complete set of results can be found in Results Review.                  Passed - In person appointment or virtual visit in the past 6 months       Recent Outpatient Visits              2 weeks ago Encounter for annual health examination    Minus MD [place]    Office Visit    2 months ago History of colonic diverticulitis    [facility], [address] [facility], [place], ISIAH Hendricks    Office Visit    3 months ago Chronic pain of both knees    [facility] [place], Eugene Cazares MD    Office Visit    4 months ago BPH with obstruction/lower urinary tract symptoms    [facility], [address] [facility] [address], Darcie Gonzales MD    Office Visit    4 months ago Plantar wart    [facility], [address], [address] [place] [address]    Office Visit     Future Appointments         Provider Department Appt Notes    In 1 week [address] MR RM[address] ([address]) [facility] MRI     In 1 month Gwendolyn Owens MD [facility], [facility] left knee still swollen and [place]    In 5 months Lurdes Flynn MD [facility], [facility] 6 mo f/u    In 8 months Zehra Zamarripa MD [facility], [address] [facility] 1 yr follow up               Passed - GFR > 50     No results found for: [facility]

## 2022-09-07 ENCOUNTER — HOSPITAL ENCOUNTER (OUTPATIENT)
Dept: MRI IMAGING | Facility: HOSPITAL | Age: 72
Discharge: HOME | End: 2022-09-07
Attending: INTERNAL MEDICINE
Payer: MEDICARE

## 2022-09-07 DIAGNOSIS — Z85.528 HISTORY OF RENAL CELL CARCINOMA: ICD-10-CM

## 2022-09-07 DIAGNOSIS — R93.429 ABNORMAL CT SCAN, KIDNEY: ICD-10-CM

## 2022-09-07 PROCEDURE — A9575 INJ GADOTERATE MEGLUMI 0.1ML: HCPCS | Performed by: INTERNAL MEDICINE

## 2022-09-07 PROCEDURE — 74183 MRI ABD W/O CNTR FLWD CNTR: CPT | Performed by: INTERNAL MEDICINE

## 2022-10-06 ENCOUNTER — OFFICE VISIT (OUTPATIENT)
Dept: ORTHOPEDICS CLINIC | Facility: CLINIC | Age: 72
End: 2022-10-06
Payer: MEDICARE

## 2022-10-06 DIAGNOSIS — M25.462 KNEE EFFUSION, LEFT: Primary | ICD-10-CM

## 2022-10-06 PROCEDURE — 1126F AMNT PAIN NOTED NONE PRSNT: CPT | Performed by: ORTHOPAEDIC SURGERY

## 2022-10-06 PROCEDURE — 99213 OFFICE O/P EST LOW 20 MIN: CPT | Performed by: ORTHOPAEDIC SURGERY

## 2022-10-07 ENCOUNTER — LAB ENCOUNTER (OUTPATIENT)
Dept: LAB | Facility: HOSPITAL | Age: 72
End: 2022-10-07
Attending: ORTHOPAEDIC SURGERY
Payer: MEDICARE

## 2022-10-07 DIAGNOSIS — M25.462 KNEE EFFUSION, LEFT: ICD-10-CM

## 2022-10-07 LAB
CRP SERPL-MCNC: 0.33 MG/DL (ref ?–0.3)
ERYTHROCYTE [SEDIMENTATION RATE] IN BLOOD: 18 MM/HR

## 2022-10-07 PROCEDURE — 86140 C-REACTIVE PROTEIN: CPT

## 2022-10-07 PROCEDURE — 36415 COLL VENOUS BLD VENIPUNCTURE: CPT

## 2022-10-07 PROCEDURE — 85652 RBC SED RATE AUTOMATED: CPT

## 2023-01-06 DIAGNOSIS — I10 ESSENTIAL HYPERTENSION: ICD-10-CM

## 2023-01-06 RX ORDER — LISINOPRIL 40 MG/1
TABLET ORAL
Qty: 90 TABLET | Refills: 3 | Status: SHIPPED | OUTPATIENT
Start: 2023-01-06

## 2023-01-15 NOTE — TELEPHONE ENCOUNTER
[address] [facility], [address] [address] [place] [address] [phone], [phone]   Medication Detail      Disp Refills Start End    simvastatin 40 MG Oral Tab 90 tablet 1 2/22/2018     Sig: TAKE 1 TABLET NIGHTLY    E-Prescribing Status: Receipt conf
I will STOP taking the medications listed below when I get home from the hospital:  None

## 2023-02-14 DIAGNOSIS — I10 ESSENTIAL HYPERTENSION: ICD-10-CM

## 2023-02-15 RX ORDER — HYDROCHLOROTHIAZIDE 12.5 MG/1
12.5 TABLET ORAL DAILY
Qty: 90 TABLET | Refills: 3 | Status: SHIPPED | OUTPATIENT
Start: 2023-02-15

## 2023-02-15 NOTE — TELEPHONE ENCOUNTER
Please review. Protocol Failed or has No Protocol. Requested Prescriptions   Pending Prescriptions Disp Refills    HYDROCHLOROTHIAZIDE 12.5 MG Oral Tab [Pharmacy Med Name: hydroCHLOROthiazide 12.5 MG Oral Tablet] 90 tablet 3     Sig: TAKE 1 TABLET BY MOUTH  DAILY       Hypertensive Medications Protocol Failed - 2/14/2023 10:14 PM        Failed - CMP or BMP in past 6 months     No results found for this or any previous visit (from the past 4392 hour(s)).             Failed - In person appointment or virtual visit in the past 6 months     Recent Outpatient Visits              4 months ago Knee effusion, left    [facility], [address], [place]    Office Visit    6 months ago Encounter for annual health examination    Denys Carmona Vernida Desanctis, MD    Office Visit    7 months ago History of colonic diverticulitis    [facility], [address] [place] [address], [place], [facility]    Office Visit    8 months ago Chronic pain of both knees    [facility], [address], Ellis Short MD    Office Visit    9 months ago BPH with obstruction/lower urinary tract symptoms    [facility], [address], Nayeli Renteria MD    Office Visit          Future Appointments         Provider Department Appt Notes    In 2 months MD Yaw Fried [facility], [address] [place] [address] 6 mo f/u    In 2 months [facility], [address] [facility] 1 yr follow up               [facility] - In person appointment in the past 12 or next 3 months     Recent Outpatient Visits              4 months ago Knee effusion, left    [facility], [address], Ellis Short MD    Office Visit    6 months ago Encounter for annual health examination    Denys Carmona Vernida Desanctis, MD Office Visit    7 months ago History of colonic diverticulitis    [facility], [address] [facility], [place], [place], APRN    Office Visit    8 months ago Chronic pain of both knees    [facility], [address], [place], Hunter Bauer MD    Office Visit    9 months ago BPH with obstruction/lower urinary tract symptoms    [facility], [address], Al Castaneda MD    Office Visit          Future Appointments         Provider Department Appt Notes    In 2 months MD Emma Damon, [address] [place] [address], [employer] 6 mo f/u    In 2 months MD Emma Agarawl, [address], [employer] 1 yr follow up               Passed - Last BP reading less than 140/90     BP Readings from Last 1 Encounters:  08/17/22 : 122/68              Passed - EGFRCR or GFRNAA > 50     GFR Evaluation  GFRNAA: 73 , resulted on 6/14/2022               Recent Outpatient Visits              4 months ago Knee effusion, left    [facility], [address], [place], Hunter Bauer MD    Office Visit    6 months ago Encounter for annual health examination    Denys Panchal, Mathew Adamson MD    Office Visit    7 months ago History of colonic diverticulitis    [facility], [address] [facility], [place], [place], APRN    Office Visit    8 months ago Chronic pain of both knees    Emma Suárez, [address], [place], Hunter Bauer MD    Office Visit    9 months ago BPH with obstruction/lower urinary tract symptoms    [facility], [address], Al Castaneda MD    Office Visit            Future Appointments         Provider Department Appt Notes    In 2 months MD Emma Damon, [address] [place] [address], [employer] 6 mo f/u    In 2 months MD Emma Agarwal, [address] 1 yr follow up

## 2023-03-22 DIAGNOSIS — I10 ESSENTIAL HYPERTENSION: ICD-10-CM

## 2023-03-23 RX ORDER — LABETALOL 100 MG/1
200 TABLET, FILM COATED ORAL 2 TIMES DAILY
Qty: 360 TABLET | Refills: 1 | Status: SHIPPED | OUTPATIENT
Start: 2023-03-23

## 2023-03-23 NOTE — TELEPHONE ENCOUNTER
Protocol failed or has No Protocol, please review  Requested Prescriptions   Pending Prescriptions Disp Refills    LABETALOL 100 MG Oral Tab [Pharmacy Med Name: Labetalol HCl 100 MG Oral Tablet] 360 tablet 3     Sig: TAKE 2 TABLETS BY MOUTH  TWICE DAILY       Hypertensive Medications Protocol Failed - 3/22/2023  5:16 AM        Failed - CMP or BMP in past 6 months     No results found for this or any previous visit (from the past 4392 hour(s)).             Failed - In person appointment or virtual visit in the past 6 months     Recent Outpatient Visits              5 months ago Knee effusion, left    [facility], [address], Taina Short MD    Office Visit    7 months ago Encounter for annual health examination    [address] [facility], [place], Claudean Man, MD    Office Visit    8 months ago History of colonic diverticulitis    [facility], [address] [facility], ISIAH Hendricks    Office Visit    10 months ago Chronic pain of both knees    [address], [address], Taina Short MD    Office Visit    10 months ago BPH with obstruction/lower urinary tract symptoms    [facility], [address], Oliva Ratliff MD    Office Visit          Future Appointments         Provider Department Appt Notes    In 1 month Patrizia Jiménez MD [address], [address] [facility] 6 mo f/u    In 1 month Michelle Bañuelos MD [address], [address] [facility] [place] [facility] 1 yr follow up               [facility] - In person appointment in the past 12 or next 3 months     Recent Outpatient Visits              5 months ago Knee effusion, left    [facility], [address], Taina Short MD    Office Visit    7 months ago Encounter for annual health examination    [address] [facility], [place], Claudean Man, MD    Office Visit    8 months ago History of colonic diverticulitis    [facility], [address] [facility] [place], [place], APRN    Office Visit    10 months ago Chronic pain of both knees    [facility], [address], Hunter Short MD    Office Visit    10 months ago BPH with obstruction/lower urinary tract symptoms    [facility], [address], Shi Lowry MD    Office Visit          Future Appointments         Provider Department Appt Notes    In 1 month Aixa Zhou MD [address], [address] [place] [address], [address] 6 mo f/u    In 1 month Kojo Harman MD [address], [address], [address] 1 yr follow up               Passed - Last BP reading less than 140/90     BP Readings from Last 1 Encounters:  08/17/22 : 122/68              Passed - EGFRCR or GFRNAA > 50     GFR Evaluation  GFRNAA: 73 , resulted on 6/14/2022             Future Appointments         Provider Department Appt Notes    In 1 month Aixa Zhou MD [address], [address] [facility], [address] 6 mo f/u    In 1 month Kojo Harman MD [address], [address], [address] 1 yr follow up          Recent Outpatient Visits              5 months ago Knee effusion, left    [facility], [address], Hunter Short MD    Office Visit    7 months ago Encounter for annual health examination    Denys Varner Jason Castellani, MD    Office Visit    8 months ago History of colonic diverticulitis    [facility], [address] [facility] [place], [place], APRN    Office Visit    10 months ago Chronic pain of both knees    Han Varner Pecolia Hives, MD    Office Visit    10 months ago BPH with obstruction/lower urinary tract symptoms    [facility], [address], Alen Marry Ramirez MD    Office Visit

## 2023-04-21 ENCOUNTER — LAB ENCOUNTER (OUTPATIENT)
Dept: LAB | Facility: HOSPITAL | Age: 73
DRG: 310 | End: 2023-04-21
Attending: INTERNAL MEDICINE
Payer: MEDICARE

## 2023-04-21 ENCOUNTER — LAB ENCOUNTER (OUTPATIENT)
Dept: LAB | Facility: HOSPITAL | Age: 73
End: 2023-04-21
Attending: INTERNAL MEDICINE
Payer: MEDICARE

## 2023-04-21 DIAGNOSIS — Z12.5 SCREENING FOR PROSTATE CANCER: ICD-10-CM

## 2023-04-21 DIAGNOSIS — I10 ESSENTIAL HYPERTENSION: ICD-10-CM

## 2023-04-21 DIAGNOSIS — E55.9 VITAMIN D DEFICIENCY: ICD-10-CM

## 2023-04-21 LAB
ALBUMIN SERPL-MCNC: 3.8 G/DL (ref 3.4–5)
ALBUMIN/GLOB SERPL: 1 {RATIO} (ref 1–2)
ALP LIVER SERPL-CCNC: 73 U/L
ALT SERPL-CCNC: 29 U/L
ANION GAP SERPL CALC-SCNC: 6 MMOL/L (ref 0–18)
AST SERPL-CCNC: 25 U/L (ref 15–37)
BASOPHILS # BLD AUTO: 0.03 X10(3) UL (ref 0–0.2)
BASOPHILS NFR BLD AUTO: 0.5 %
BILIRUB SERPL-MCNC: 0.5 MG/DL (ref 0.1–2)
BUN BLD-MCNC: 23 MG/DL (ref 7–18)
BUN/CREAT SERPL: 17.8 (ref 10–20)
CALCIUM BLD-MCNC: 9.5 MG/DL (ref 8.5–10.1)
CHLORIDE SERPL-SCNC: 111 MMOL/L (ref 98–112)
CHOLEST SERPL-MCNC: 90 MG/DL (ref ?–200)
CO2 SERPL-SCNC: 28 MMOL/L (ref 21–32)
COMPLEXED PSA SERPL-MCNC: 0.91 NG/ML (ref ?–4)
CREAT BLD-MCNC: 1.29 MG/DL
DEPRECATED RDW RBC AUTO: 45.9 FL (ref 35.1–46.3)
EOSINOPHIL # BLD AUTO: 0.08 X10(3) UL (ref 0–0.7)
EOSINOPHIL NFR BLD AUTO: 1.3 %
ERYTHROCYTE [DISTWIDTH] IN BLOOD BY AUTOMATED COUNT: 14.4 % (ref 11–15)
FASTING PATIENT LIPID ANSWER: YES
FASTING STATUS PATIENT QL REPORTED: YES
GFR SERPLBLD BASED ON 1.73 SQ M-ARVRAT: 58 ML/MIN/1.73M2 (ref 60–?)
GLOBULIN PLAS-MCNC: 3.8 G/DL (ref 2.8–4.4)
GLUCOSE BLD-MCNC: 136 MG/DL (ref 70–99)
HCT VFR BLD AUTO: 44.4 %
HDLC SERPL-MCNC: 28 MG/DL (ref 40–59)
HGB BLD-MCNC: 14.5 G/DL
IMM GRANULOCYTES # BLD AUTO: 0.02 X10(3) UL (ref 0–1)
IMM GRANULOCYTES NFR BLD: 0.3 %
LDLC SERPL CALC-MCNC: 45 MG/DL (ref ?–100)
LYMPHOCYTES # BLD AUTO: 1.94 X10(3) UL (ref 1–4)
LYMPHOCYTES NFR BLD AUTO: 30.8 %
MCH RBC QN AUTO: 28.6 PG (ref 26–34)
MCHC RBC AUTO-ENTMCNC: 32.7 G/DL (ref 31–37)
MCV RBC AUTO: 87.6 FL
MONOCYTES # BLD AUTO: 0.95 X10(3) UL (ref 0.1–1)
MONOCYTES NFR BLD AUTO: 15.1 %
NEUTROPHILS # BLD AUTO: 3.27 X10 (3) UL (ref 1.5–7.7)
NEUTROPHILS # BLD AUTO: 3.27 X10(3) UL (ref 1.5–7.7)
NEUTROPHILS NFR BLD AUTO: 52 %
NONHDLC SERPL-MCNC: 62 MG/DL (ref ?–130)
OSMOLALITY SERPL CALC.SUM OF ELEC: 306 MOSM/KG (ref 275–295)
PLATELET # BLD AUTO: 255 10(3)UL (ref 150–450)
POTASSIUM SERPL-SCNC: 4.1 MMOL/L (ref 3.5–5.1)
PROT SERPL-MCNC: 7.6 G/DL (ref 6.4–8.2)
RBC # BLD AUTO: 5.07 X10(6)UL
SODIUM SERPL-SCNC: 145 MMOL/L (ref 136–145)
T4 FREE SERPL-MCNC: 1 NG/DL (ref 0.8–1.7)
TRIGL SERPL-MCNC: 86 MG/DL (ref 30–149)
TSI SER-ACNC: 4.24 MIU/ML (ref 0.36–3.74)
VIT D+METAB SERPL-MCNC: 16.4 NG/ML (ref 30–100)
VLDLC SERPL CALC-MCNC: 12 MG/DL (ref 0–30)
WBC # BLD AUTO: 6.3 X10(3) UL (ref 4–11)

## 2023-04-21 PROCEDURE — 84443 ASSAY THYROID STIM HORMONE: CPT

## 2023-04-21 PROCEDURE — 85025 COMPLETE CBC W/AUTO DIFF WBC: CPT

## 2023-04-21 PROCEDURE — 36415 COLL VENOUS BLD VENIPUNCTURE: CPT

## 2023-04-21 PROCEDURE — 84439 ASSAY OF FREE THYROXINE: CPT

## 2023-04-21 PROCEDURE — 82306 VITAMIN D 25 HYDROXY: CPT

## 2023-04-21 PROCEDURE — 80061 LIPID PANEL: CPT

## 2023-04-21 PROCEDURE — 80053 COMPREHEN METABOLIC PANEL: CPT

## 2023-04-24 ENCOUNTER — HOSPITAL ENCOUNTER (INPATIENT)
Facility: HOSPITAL | Age: 73
LOS: 1 days | Discharge: HOME | DRG: 310 | End: 2023-04-25
Attending: STUDENT IN AN ORGANIZED HEALTH CARE EDUCATION/TRAINING PROGRAM | Admitting: HOSPITALIST
Payer: MEDICARE

## 2023-04-24 ENCOUNTER — APPOINTMENT (OUTPATIENT)
Dept: CT IMAGING | Facility: HOSPITAL | Age: 73
DRG: 310 | End: 2023-04-24
Attending: STUDENT IN AN ORGANIZED HEALTH CARE EDUCATION/TRAINING PROGRAM
Payer: MEDICARE

## 2023-04-24 ENCOUNTER — APPOINTMENT (OUTPATIENT)
Dept: GENERAL RADIOLOGY | Facility: HOSPITAL | Age: 73
DRG: 310 | End: 2023-04-24
Attending: STUDENT IN AN ORGANIZED HEALTH CARE EDUCATION/TRAINING PROGRAM
Payer: MEDICARE

## 2023-04-24 ENCOUNTER — OFFICE VISIT (OUTPATIENT)
Facility: CLINIC | Age: 73
End: 2023-04-24

## 2023-04-24 VITALS
DIASTOLIC BLOOD PRESSURE: 60 MMHG | RESPIRATION RATE: 20 BRPM | WEIGHT: 267.63 LBS | SYSTOLIC BLOOD PRESSURE: 102 MMHG | TEMPERATURE: 98 F | HEART RATE: 120 BPM | BODY MASS INDEX: 35.47 KG/M2 | HEIGHT: 73 IN

## 2023-04-24 DIAGNOSIS — I48.92 NEW ONSET ATRIAL FLUTTER (HCC): Primary | ICD-10-CM

## 2023-04-24 DIAGNOSIS — I48.92 ATRIAL FLUTTER, UNSPECIFIED TYPE (HCC): Primary | ICD-10-CM

## 2023-04-24 DIAGNOSIS — I10 ESSENTIAL HYPERTENSION: ICD-10-CM

## 2023-04-24 DIAGNOSIS — E55.9 VITAMIN D DEFICIENCY: ICD-10-CM

## 2023-04-24 DIAGNOSIS — I48.92 ATRIAL FLUTTER WITH RAPID VENTRICULAR RESPONSE (HCC): ICD-10-CM

## 2023-04-24 DIAGNOSIS — Z85.528 HISTORY OF RENAL CELL CARCINOMA: ICD-10-CM

## 2023-04-24 DIAGNOSIS — N40.0 BENIGN PROSTATIC HYPERPLASIA WITHOUT LOWER URINARY TRACT SYMPTOMS: ICD-10-CM

## 2023-04-24 DIAGNOSIS — E78.5 HYPERLIPIDEMIA, UNSPECIFIED HYPERLIPIDEMIA TYPE: ICD-10-CM

## 2023-04-24 LAB
ALBUMIN SERPL-MCNC: 4 G/DL (ref 3.4–5)
ALBUMIN/GLOB SERPL: 1.1 {RATIO} (ref 1–2)
ALP LIVER SERPL-CCNC: 74 U/L
ALT SERPL-CCNC: 31 U/L
ANION GAP SERPL CALC-SCNC: 8 MMOL/L (ref 0–18)
APTT PPP: 32.6 SECONDS (ref 23.3–35.6)
APTT PPP: 52.8 SECONDS (ref 23.3–35.6)
AST SERPL-CCNC: 22 U/L (ref 15–37)
ATRIAL RATE: 264 BPM
BASOPHILS # BLD AUTO: 0.03 X10(3) UL (ref 0–0.2)
BASOPHILS NFR BLD AUTO: 0.4 %
BILIRUB SERPL-MCNC: 0.6 MG/DL (ref 0.1–2)
BUN BLD-MCNC: 24 MG/DL (ref 7–18)
BUN/CREAT SERPL: 16.2 (ref 10–20)
CALCIUM BLD-MCNC: 9.4 MG/DL (ref 8.5–10.1)
CHLORIDE SERPL-SCNC: 110 MMOL/L (ref 98–112)
CO2 SERPL-SCNC: 26 MMOL/L (ref 21–32)
CREAT BLD-MCNC: 1.48 MG/DL
D DIMER PPP FEU-MCNC: 0.84 UG/ML FEU (ref ?–0.74)
DEPRECATED RDW RBC AUTO: 43.2 FL (ref 35.1–46.3)
EOSINOPHIL # BLD AUTO: 0.15 X10(3) UL (ref 0–0.7)
EOSINOPHIL NFR BLD AUTO: 2.1 %
ERYTHROCYTE [DISTWIDTH] IN BLOOD BY AUTOMATED COUNT: 14 % (ref 11–15)
GFR SERPLBLD BASED ON 1.73 SQ M-ARVRAT: 49 ML/MIN/1.73M2 (ref 60–?)
GLOBULIN PLAS-MCNC: 3.7 G/DL (ref 2.8–4.4)
GLUCOSE BLD-MCNC: 116 MG/DL (ref 70–99)
HCT VFR BLD AUTO: 43.3 %
HGB BLD-MCNC: 14.5 G/DL
IMM GRANULOCYTES # BLD AUTO: 0.02 X10(3) UL (ref 0–1)
IMM GRANULOCYTES NFR BLD: 0.3 %
LYMPHOCYTES # BLD AUTO: 1.49 X10(3) UL (ref 1–4)
LYMPHOCYTES NFR BLD AUTO: 21.1 %
MCH RBC QN AUTO: 28.5 PG (ref 26–34)
MCHC RBC AUTO-ENTMCNC: 33.5 G/DL (ref 31–37)
MCV RBC AUTO: 85.2 FL
MONOCYTES # BLD AUTO: 0.64 X10(3) UL (ref 0.1–1)
MONOCYTES NFR BLD AUTO: 9.1 %
NEUTROPHILS # BLD AUTO: 4.74 X10 (3) UL (ref 1.5–7.7)
NEUTROPHILS # BLD AUTO: 4.74 X10(3) UL (ref 1.5–7.7)
NEUTROPHILS NFR BLD AUTO: 67 %
NT-PROBNP SERPL-MCNC: 3505 PG/ML (ref ?–125)
OSMOLALITY SERPL CALC.SUM OF ELEC: 303 MOSM/KG (ref 275–295)
P AXIS: 251 DEGREES
PLATELET # BLD AUTO: 266 10(3)UL (ref 150–450)
POTASSIUM SERPL-SCNC: 3.6 MMOL/L (ref 3.5–5.1)
PROT SERPL-MCNC: 7.7 G/DL (ref 6.4–8.2)
Q-T INTERVAL: 384 MS
QRS DURATION: 76 MS
QTC CALCULATION (BEZET): 568 MS
R AXIS: 52 DEGREES
RBC # BLD AUTO: 5.08 X10(6)UL
SODIUM SERPL-SCNC: 144 MMOL/L (ref 136–145)
T AXIS: 256 DEGREES
T4 FREE SERPL-MCNC: 1.2 NG/DL (ref 0.8–1.7)
TROPONIN I HIGH SENSITIVITY: 12 NG/L
TSI SER-ACNC: 4.49 MIU/ML (ref 0.36–3.74)
VENTRICULAR RATE: 132 BPM
WBC # BLD AUTO: 7.1 X10(3) UL (ref 4–11)

## 2023-04-24 PROCEDURE — 99215 OFFICE O/P EST HI 40 MIN: CPT | Performed by: INTERNAL MEDICINE

## 2023-04-24 PROCEDURE — 71045 X-RAY EXAM CHEST 1 VIEW: CPT | Performed by: STUDENT IN AN ORGANIZED HEALTH CARE EDUCATION/TRAINING PROGRAM

## 2023-04-24 PROCEDURE — 99223 1ST HOSP IP/OBS HIGH 75: CPT | Performed by: HOSPITALIST

## 2023-04-24 PROCEDURE — 1126F AMNT PAIN NOTED NONE PRSNT: CPT | Performed by: INTERNAL MEDICINE

## 2023-04-24 PROCEDURE — 71260 CT THORAX DX C+: CPT | Performed by: STUDENT IN AN ORGANIZED HEALTH CARE EDUCATION/TRAINING PROGRAM

## 2023-04-24 PROCEDURE — 93000 ELECTROCARDIOGRAM COMPLETE: CPT | Performed by: INTERNAL MEDICINE

## 2023-04-24 RX ORDER — HEPARIN SODIUM 1000 [USP'U]/ML
5000 INJECTION, SOLUTION INTRAVENOUS; SUBCUTANEOUS ONCE
Status: COMPLETED | OUTPATIENT
Start: 2023-04-24 | End: 2023-04-24

## 2023-04-24 RX ORDER — DILTIAZEM HYDROCHLORIDE 5 MG/ML
20 INJECTION INTRAVENOUS ONCE
Status: COMPLETED | OUTPATIENT
Start: 2023-04-24 | End: 2023-04-24

## 2023-04-24 RX ORDER — METOPROLOL TARTRATE 50 MG/1
50 TABLET, FILM COATED ORAL
Status: DISCONTINUED | OUTPATIENT
Start: 2023-04-24 | End: 2023-04-25

## 2023-04-24 RX ORDER — HEPARIN SODIUM AND DEXTROSE 10000; 5 [USP'U]/100ML; G/100ML
INJECTION INTRAVENOUS CONTINUOUS
Status: DISCONTINUED | OUTPATIENT
Start: 2023-04-24 | End: 2023-04-25

## 2023-04-24 RX ORDER — HEPARIN SODIUM AND DEXTROSE 10000; 5 [USP'U]/100ML; G/100ML
1000 INJECTION INTRAVENOUS ONCE
Status: COMPLETED | OUTPATIENT
Start: 2023-04-24 | End: 2023-04-24

## 2023-04-24 RX ORDER — METOPROLOL TARTRATE 5 MG/5ML
5 INJECTION INTRAVENOUS EVERY 4 HOURS PRN
Status: DISCONTINUED | OUTPATIENT
Start: 2023-04-24 | End: 2023-04-25

## 2023-04-24 RX ORDER — SODIUM CHLORIDE 0.9 % (FLUSH) 0.9 %
10 SYRINGE (ML) INJECTION AS NEEDED
Status: DISCONTINUED | OUTPATIENT
Start: 2023-04-24 | End: 2023-04-25

## 2023-04-24 RX ORDER — METOCLOPRAMIDE HYDROCHLORIDE 5 MG/ML
10 INJECTION INTRAMUSCULAR; INTRAVENOUS EVERY 8 HOURS PRN
Status: DISCONTINUED | OUTPATIENT
Start: 2023-04-24 | End: 2023-04-25

## 2023-04-24 RX ORDER — ATORVASTATIN CALCIUM 20 MG/1
20 TABLET, FILM COATED ORAL NIGHTLY
Status: DISCONTINUED | OUTPATIENT
Start: 2023-04-24 | End: 2023-04-25

## 2023-04-24 RX ORDER — SODIUM CHLORIDE 9 MG/ML
INJECTION, SOLUTION INTRAVENOUS CONTINUOUS
Status: DISCONTINUED | OUTPATIENT
Start: 2023-04-24 | End: 2023-04-25

## 2023-04-24 RX ORDER — TEMAZEPAM 15 MG/1
15 CAPSULE ORAL NIGHTLY PRN
Status: DISCONTINUED | OUTPATIENT
Start: 2023-04-24 | End: 2023-04-25

## 2023-04-24 RX ORDER — DILTIAZEM HYDROCHLORIDE 5 MG/ML
0.35 INJECTION INTRAVENOUS ONCE
Status: COMPLETED | OUTPATIENT
Start: 2023-04-24 | End: 2023-04-24

## 2023-04-24 RX ORDER — ONDANSETRON 2 MG/ML
4 INJECTION INTRAMUSCULAR; INTRAVENOUS EVERY 6 HOURS PRN
Status: DISCONTINUED | OUTPATIENT
Start: 2023-04-24 | End: 2023-04-25

## 2023-04-24 RX ORDER — METOPROLOL TARTRATE 5 MG/5ML
5 INJECTION INTRAVENOUS ONCE
Status: COMPLETED | OUTPATIENT
Start: 2023-04-24 | End: 2023-04-24

## 2023-04-24 RX ORDER — SODIUM CHLORIDE 9 MG/ML
INJECTION, SOLUTION INTRAVENOUS
Status: COMPLETED | OUTPATIENT
Start: 2023-04-25 | End: 2023-04-25

## 2023-04-24 RX ORDER — ACETAMINOPHEN 500 MG
500 TABLET ORAL EVERY 4 HOURS PRN
Status: DISCONTINUED | OUTPATIENT
Start: 2023-04-24 | End: 2023-04-25

## 2023-04-24 NOTE — H&P
[facility]    PATIENT'S NAME: Amy TAVERAS   ATTENDING PHYSICIAN: Coni Schaefer MD   PATIENT ACCOUNT#:   [de-identified]    LOCATION:  [address]  MEDICAL RECORD #:   [id number]       YOB: 1950  ADMISSION DATE:       04/24/2023    HISTORY AND PHYSICAL EXAMINATION    CHIEF COMPLAINT:  Atrial flutter with rapid ventricular response. HISTORY OF PRESENT ILLNESS:  The patient is a 77-year-old  male who presented to the emergency department for evaluation for generalized fatigue, weakness, and occasional low blood pressure for the last several days. Today he was seen by his primary care physician in the office and was noted to be in fast heart rhythm and sent to the emergency department for evaluation. ProBNP 3500. TSH 4.490. Free T4 is still pending. Chemistry showed GFR of 49 which is slightly below his baseline. Chemistry was unremarkable. Chest x-ray still pending. PAST MEDICAL HISTORY:  Hypertension, hyperlipidemia, generalized osteoarthritis, enlarged prostate. 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 MEDICATIONS:  Please see medication reconciliation list.    ALLERGIES:  No known drug allergies. FAMILY HISTORY:  Mother had breast cancer. Father had alcoholism. SOCIAL HISTORY:  Drinks social alcohol. No drug or tobacco use. Lives with his family. Independent in his basic activities of daily living. REVIEW OF SYSTEMS:  The patient reports generalized fatigue and weakness for the last week. He does not think he felt palpitations. He has been feeling just tired. No chest pain, no orthopnea, no dyspnea on exertion. Other 12-point review of systems negative. PHYSICAL EXAMINATION:    GENERAL:  Alert. Oriented to time, place, and person. Fatigued. No acute distress.   VITAL SIGNS: Temperature 98.1, pulse 131, monitor and EKG showed atrial flutter with rapid ventricular response, respiratory rate 17, blood pressure 121/92, pulse ox 96% on room air. HEENT:  Atraumatic. Oropharynx clear, dry mucous membranes. Ears, nose normal.  Eyes:  Anicteric sclerae. NECK:  Supple. No lymphadenopathy. Trachea midline. LUNGS:  Clear to auscultation bilaterally. Normal respiratory effort. HEART:  Tachycardic. S1, S2 auscultated. No murmur. ABDOMEN:  Soft, nondistended, no tenderness. EXTREMITIES:  No edema, clubbing, or cyanosis. NEUROLOGIC:  Motor and sensory intact. ASSESSMENT:    1. Atrial flutter with rapid ventricular response. 2.   Hypertension. 3.   Mild elevation of TSH. Follow up on free T4.  4.   Elevated proBNP. No clear evidence or clinical manifestation of overt fluid overload or heart failure. PLAN:  We will obtain cardiology consult, start him on IV Cardizem drip, Xarelto for thromboembolic prophylaxis, obtain 2D echocardiogram with Doppler. Further recommendations to follow.     Dictated By Jonah Escoto MD  d: 04/24/2023 13:33:45  t: 04/24/2023 13:39:11  Job [id number]  FB/    cc: Darrius Barrios MD

## 2023-04-24 NOTE — ED QUICK NOTES
Pt to ED from PCP office for abnormal EKG/new onset aflutter. Pt states he has not been feeling well for a couple of weeks. Pt states intermittent dizziness. Pt denies chest pain or sob. No respiratory distress noted. Bilateral lungs clear. Pt states dry cough for a couple of weeks without noted fever. Pt denies nausea, vomiting, or diarrhea. Pt denies fall or trauma. Abdomen soft and non distended. Pt denies asa or thinners. Pt is alert and oriented x4. Pt ambulating by self with steady gait. Pt skin parameters WNL. IV established to [address] [place] [address] #18G in triage-SL. Awaiting labs. Cardiac monitor and continuous pulse oximetry on. Aflutter noted on monitor. Will continue to monitor.

## 2023-04-24 NOTE — ED INITIAL ASSESSMENT (HPI)
PATIENT TO ED VIA PRIVATE VEHICLE CO OF HYPOTENSION X FEW DAYS HX OF HTN PATIENT WENT TO GET HIS 6MO CHECK UP WITH HIS PCP AND FOUND HIS BP TO BE .

## 2023-04-24 NOTE — PLAN OF CARE
Problem: Patient Centered Care  Goal: Patient preferences are identified and integrated in the patient's plan of care  Description: Interventions:  - What would you like us to know as we care for you? I live at home with my wife  - Provide timely, complete, and accurate information to patient/family  - Incorporate patient and family knowledge, values, beliefs, and cultural backgrounds into the planning and delivery of care  - Encourage patient/family to participate in care and decision-making at the level they choose  - Honor patient and family perspectives and choices  Outcome: Progressing     Problem: Patient/Family Goals  Goal: Patient/Family Long Term Goal  Description: Patient's Long Term Goal: to get heart rate under control    Interventions:  - Monitor vital signs  -Scheduled and PRN medications  - See additional Care Plan goals for specific interventions  Outcome: Progressing  Goal: Patient/Family Short Term Goal  Description: Patient's Short Term Goal: to go home    Interventions:   - Monitor vital signs  - See additional Care Plan goals for specific interventions  Outcome: Progressing     Problem: CARDIOVASCULAR - ADULT  Goal: Maintains optimal cardiac output and hemodynamic stability  Description: INTERVENTIONS:  - Monitor vital signs, rhythm, and trends  - Monitor for bleeding, hypotension and signs of decreased cardiac output  - Evaluate effectiveness of vasoactive medications to optimize hemodynamic stability  - Monitor arterial and/or venous puncture sites for bleeding and/or hematoma  - Assess quality of pulses, skin color and temperature  - Assess for signs of decreased coronary artery perfusion - ex.  Angina  - Evaluate fluid balance, assess for edema, trend weights  Outcome: Progressing  Goal: Absence of cardiac arrhythmias or at baseline  Description: INTERVENTIONS:  - Continuous cardiac monitoring, monitor vital signs, obtain 12 lead EKG if indicated  - Evaluate effectiveness of antiarrhythmic and heart rate control medications as ordered  - Initiate emergency measures for life threatening arrhythmias  - Monitor electrolytes and administer replacement therapy as ordered  Outcome: Progressing     Pt alert and oriented X 4. Pt on room air. No complaints throughout the day. Pt on IV heparin- see MAR. Pt on IV fluids. Safety precautions in place, call light within reach. Plan is for a TED and cardioversion tomorrow.

## 2023-04-25 ENCOUNTER — APPOINTMENT (OUTPATIENT)
Dept: CV DIAGNOSTICS | Facility: HOSPITAL | Age: 73
DRG: 310 | End: 2023-04-25
Attending: NURSE PRACTITIONER
Payer: MEDICARE

## 2023-04-25 ENCOUNTER — TELEPHONE (OUTPATIENT)
Dept: SURGERY | Facility: CLINIC | Age: 73
End: 2023-04-25

## 2023-04-25 ENCOUNTER — APPOINTMENT (OUTPATIENT)
Dept: CV DIAGNOSTICS | Facility: HOSPITAL | Age: 73
DRG: 310 | End: 2023-04-25
Attending: HOSPITALIST
Payer: MEDICARE

## 2023-04-25 ENCOUNTER — APPOINTMENT (OUTPATIENT)
Dept: INTERVENTIONAL RADIOLOGY/VASCULAR | Facility: HOSPITAL | Age: 73
DRG: 310 | End: 2023-04-25
Attending: INTERNAL MEDICINE
Payer: MEDICARE

## 2023-04-25 VITALS
SYSTOLIC BLOOD PRESSURE: 107 MMHG | OXYGEN SATURATION: 93 % | WEIGHT: 265.31 LBS | TEMPERATURE: 98 F | HEART RATE: 66 BPM | RESPIRATION RATE: 18 BRPM | DIASTOLIC BLOOD PRESSURE: 73 MMHG | BODY MASS INDEX: 35 KG/M2

## 2023-04-25 LAB
ANION GAP SERPL CALC-SCNC: 8 MMOL/L (ref 0–18)
APTT PPP: 33 SECONDS (ref 23.3–35.6)
APTT PPP: 47.3 SECONDS (ref 23.3–35.6)
ATRIAL RATE: 252 BPM
ATRIAL RATE: 61 BPM
BASOPHILS # BLD AUTO: 0.02 X10(3) UL (ref 0–0.2)
BASOPHILS NFR BLD AUTO: 0.3 %
BUN BLD-MCNC: 23 MG/DL (ref 7–18)
BUN/CREAT SERPL: 19.7 (ref 10–20)
CALCIUM BLD-MCNC: 8.8 MG/DL (ref 8.5–10.1)
CHLORIDE SERPL-SCNC: 110 MMOL/L (ref 98–112)
CO2 SERPL-SCNC: 27 MMOL/L (ref 21–32)
CREAT BLD-MCNC: 1.17 MG/DL
DEPRECATED RDW RBC AUTO: 43.8 FL (ref 35.1–46.3)
EOSINOPHIL # BLD AUTO: 0.16 X10(3) UL (ref 0–0.7)
EOSINOPHIL NFR BLD AUTO: 2.3 %
ERYTHROCYTE [DISTWIDTH] IN BLOOD BY AUTOMATED COUNT: 14.3 % (ref 11–15)
GFR SERPLBLD BASED ON 1.73 SQ M-ARVRAT: 65 ML/MIN/1.73M2 (ref 60–?)
GLUCOSE BLD-MCNC: 105 MG/DL (ref 70–99)
HCT VFR BLD AUTO: 40 %
HGB BLD-MCNC: 13.3 G/DL
IMM GRANULOCYTES # BLD AUTO: 0.02 X10(3) UL (ref 0–1)
IMM GRANULOCYTES NFR BLD: 0.3 %
LYMPHOCYTES # BLD AUTO: 2.03 X10(3) UL (ref 1–4)
LYMPHOCYTES NFR BLD AUTO: 29.8 %
MCH RBC QN AUTO: 28.2 PG (ref 26–34)
MCHC RBC AUTO-ENTMCNC: 33.3 G/DL (ref 31–37)
MCV RBC AUTO: 84.7 FL
MONOCYTES # BLD AUTO: 0.65 X10(3) UL (ref 0.1–1)
MONOCYTES NFR BLD AUTO: 9.5 %
NEUTROPHILS # BLD AUTO: 3.94 X10 (3) UL (ref 1.5–7.7)
NEUTROPHILS # BLD AUTO: 3.94 X10(3) UL (ref 1.5–7.7)
NEUTROPHILS NFR BLD AUTO: 57.8 %
OSMOLALITY SERPL CALC.SUM OF ELEC: 304 MOSM/KG (ref 275–295)
P AXIS: 168 DEGREES
P AXIS: 31 DEGREES
P-R INTERVAL: 206 MS
PLATELET # BLD AUTO: 233 10(3)UL (ref 150–450)
POTASSIUM SERPL-SCNC: 3.3 MMOL/L (ref 3.5–5.1)
Q-T INTERVAL: 298 MS
Q-T INTERVAL: 452 MS
QRS DURATION: 82 MS
QRS DURATION: 86 MS
QTC CALCULATION (BEZET): 431 MS
QTC CALCULATION (BEZET): 455 MS
R AXIS: 7 DEGREES
R AXIS: 8 DEGREES
RBC # BLD AUTO: 4.72 X10(6)UL
SODIUM SERPL-SCNC: 145 MMOL/L (ref 136–145)
T AXIS: 38 DEGREES
T AXIS: 97 DEGREES
VENTRICULAR RATE: 126 BPM
VENTRICULAR RATE: 61 BPM
WBC # BLD AUTO: 6.8 X10(3) UL (ref 4–11)

## 2023-04-25 PROCEDURE — B246ZZ4 ULTRASONOGRAPHY OF RIGHT AND LEFT HEART, TRANSESOPHAGEAL: ICD-10-PCS | Performed by: INTERNAL MEDICINE

## 2023-04-25 PROCEDURE — 93320 DOPPLER ECHO COMPLETE: CPT | Performed by: NURSE PRACTITIONER

## 2023-04-25 PROCEDURE — 93325 DOPPLER ECHO COLOR FLOW MAPG: CPT | Performed by: NURSE PRACTITIONER

## 2023-04-25 PROCEDURE — 5A2204Z RESTORATION OF CARDIAC RHYTHM, SINGLE: ICD-10-PCS | Performed by: INTERNAL MEDICINE

## 2023-04-25 PROCEDURE — 93306 TTE W/DOPPLER COMPLETE: CPT | Performed by: HOSPITALIST

## 2023-04-25 PROCEDURE — 99239 HOSP IP/OBS DSCHRG MGMT >30: CPT | Performed by: INTERNAL MEDICINE

## 2023-04-25 RX ORDER — MIDAZOLAM HYDROCHLORIDE 1 MG/ML
INJECTION INTRAMUSCULAR; INTRAVENOUS
Status: COMPLETED
Start: 2023-04-25 | End: 2023-04-25

## 2023-04-25 RX ORDER — MIDAZOLAM HYDROCHLORIDE 1 MG/ML
INJECTION INTRAMUSCULAR; INTRAVENOUS
Status: DISCONTINUED
Start: 2023-04-25 | End: 2023-04-25

## 2023-04-25 RX ORDER — METOPROLOL TARTRATE 50 MG/1
50 TABLET, FILM COATED ORAL
Qty: 60 TABLET | Refills: 2 | Status: SHIPPED | OUTPATIENT
Start: 2023-04-25

## 2023-04-25 RX ORDER — POTASSIUM CHLORIDE 20 MEQ/1
40 TABLET, EXTENDED RELEASE ORAL ONCE
Status: COMPLETED | OUTPATIENT
Start: 2023-04-25 | End: 2023-04-25

## 2023-04-25 RX ORDER — MIDAZOLAM HYDROCHLORIDE 1 MG/ML
6 INJECTION INTRAMUSCULAR; INTRAVENOUS ONCE
Status: COMPLETED | OUTPATIENT
Start: 2023-04-25 | End: 2023-04-25

## 2023-04-25 NOTE — PLAN OF CARE
Patient was on heperain gtt and cardizem gtt. Cardiologist performed TED and cardioversion. Rhythm converted from Aflutter back to NSR. Heparin and Cardizem discontinued. Eliquis PO started. Given discharge education by me, patient verbalized understanding. Very happy to go home. Problem: Patient Centered Care  Goal: Patient preferences are identified and integrated in the patient's plan of care  Description: Interventions:  - What would you like us to know as we care for you?  I live at home with my wife  - Provide timely, complete, and accurate information to patient/family  - Incorporate patient and family knowledge, values, beliefs, and cultural backgrounds into the planning and delivery of care  - Encourage patient/family to participate in care and decision-making at the level they choose  - Honor patient and family perspectives and choices  Outcome: Adequate for Discharge     Problem: Patient/Family Goals  Goal: Patient/Family Long Term Goal  Description: Patient's Long Term Goal: to get heart rate under control    Interventions:  - Monitor vital signs  -Scheduled and PRN medications  - See additional Care Plan goals for specific interventions  Outcome: Adequate for Discharge  Goal: Patient/Family Short Term Goal  Description: Patient's Short Term Goal: to go home    Interventions:   - Monitor vital signs  - See additional Care Plan goals for specific interventions  Outcome: Adequate for Discharge     Problem: CARDIOVASCULAR - ADULT  Goal: Maintains optimal cardiac output and hemodynamic stability  Description: INTERVENTIONS:  - Monitor vital signs, rhythm, and trends  - Monitor for bleeding, hypotension and signs of decreased cardiac output  - Evaluate effectiveness of vasoactive medications to optimize hemodynamic stability  - Monitor arterial and/or venous puncture sites for bleeding and/or hematoma  - Assess quality of pulses, skin color and temperature  - Assess for signs of decreased coronary artery perfusion - ex.  Angina  - Evaluate fluid balance, assess for edema, trend weights  Outcome: Adequate for Discharge  Goal: Absence of cardiac arrhythmias or at baseline  Description: INTERVENTIONS:  - Continuous cardiac monitoring, monitor vital signs, obtain 12 lead EKG if indicated  - Evaluate effectiveness of antiarrhythmic and heart rate control medications as ordered  - Initiate emergency measures for life threatening arrhythmias  - Monitor electrolytes and administer replacement therapy as ordered  Outcome: Adequate for Discharge

## 2023-04-25 NOTE — PROGRESS NOTES
04/24/23 2016   Clinical Encounter Type   Visited With Patient and family together   Routine Visit Introduction  [facility] Introduction)   Continue Visiting No   Referral From Nurse   Referral To [address] [place] [address] Open/discussion   Family Participation in Care Often   Family Support During Treatment Often   Taxonomy   Intended Effects Establish rapport and connectedness   Methods Encourage sharing of feelings; Offer support   Interventions Acknowledge current situation; Active listening; Ask guided questions;Assist someone with Advance Directives;Assist patient with documenting values; Facilitate communication;Provide hospitality   Trigger for Consult   Trigger for Spiritual Care Consult No       Discussion:  responded to consult for POAH. When  arrived, patient was awake and alert sitting up in bed with his spouse at the bedside. They both welcomed . Patient and his spouse graciously declined  support at this time.  inquired about the patient's POAH, which he explained that he had a copy ready to be witnessed in his bag. Patient's spouse also expressed interest in creating her own POAH Document.  created a new POAH document that, per the patient, accurately reflects his wishes.  gave the original POAH document along with copies. A copy of the new POAH Document has been placed on the patient's paper chart.  also witnessed the spouse's POAH documents at the bedside.  made the patient and his spouse aware of the Spiritual Care Team's availability and encouraged them to have their nurse call if future support is ever desired. Chaplains are available for a follow-up visit PRN and can be reached at [phone]. Alexandrea Acuna (gary/Gabriel armas, Chaplain Resident.

## 2023-04-25 NOTE — IVS NOTE
Hand-Off   Procedure hand off report given to [facility]. Pt's vital signs are stable. Pt denies any pain or discomfort  Activity restriction and bedrest status reviewed    Dr. Elnor Siemens spoke with patient post procedure.      Pt transferred with tele box on

## 2023-04-25 NOTE — IVS NOTE
Inpatient Throughput Communication:    Called inpatient RN Izabel Grady and notified of scheduled procedure TED/cardioversion plan for 11 AM     Verified that appropriate consent is signed: Yes  Appropriate Consent Signed: Yes  Access Site Hair Clipped and skin prepped: Not Applicable  Patient has functional IV site: Yes  Patient received all pre-treatment medications: Not Applicable  Family aware of approximate time of procedure:  Yes

## 2023-04-25 NOTE — PROCEDURES
Transesophageal Echocardiogram      History:  Veronique Shah is a 74year old male with atrial flutter. The risks and benefits of the procedure (including, but not limited to, airway/esophageal damage, respiratory failure, myocardial infarction, stroke, and death) were discussed. The patient understands and agrees to proceed. Procedure: The patient was admitted to the invasive lab holding unit in the fasting and nonsedated state. The oropharynx was anesthetized with topical benozcaine. Intravenous sedation was administered with  50mcg of fentanyl and 6 mg of versed under continuous monitoring of oxygenation, blood pressure, and heart rate. 20 minutes. I was present the entire time with a trained observer. The TED probe was placed in the patient's oropharynx and easily advanced into the esophagus. Cardiac imaging including 2 dimensional, color flow, and pulse wave doppler were performed     1. Normal LV function  2. No evidence of left atrial or LA appendage thrombus  3. Normal appearing right heart  4. Normal appearing mitral valve. Mild to moderate mitral regurgitation, multiple jets. 5. Normal appearing aortic valve. No aortic insufficiency  6. Normal appearing tricuspid valve. mild tricuspid regurgitation  7. Normal appearing pulmonic valve  8. Normal ascending and descending aorta in portions visualized  9. Normal interatrial septum w/o evidence of shunting    Impression/Plan:    Unremarkable TED. No LA thrombus    [facility]  Cardioversion Note    [facility]   [facility] [id number] N [id number]   Admission Date 4/24/2023 Procedure Date 4/25/2023   Attending Physician Richard Acosta MD Procedure Physician Tika You MD     Pre-Operative Diagnosis: atrial flutter    Post-Operative Diagnosis: sinus rhythm    Procedure(s) Performed:    1. Cardioversion.   2.    Sedation     Anesthesia: IV Sedation with brevital    Complications: none    Summary of Case: The patient was brought to the cardiac interventional suites in a fasting and non-sedated state after providing informed consent. IV sedation was administered during continuous ECG, pulse oximeter and non-invasive hemodynamic monitoring. After administering a total of 30 mg of IV brevital in intermittent boluses, the desired level of sedation was achieved. A single 200-joule synchronized biphasic shock was delivered with successful conversion to sinus rhythm. The patient remained on telemetry until fully recovered. There were no complications. Conclusion:  1. Successful cardioversion. Disposition: home today.  eliquis 5 mg BID. Stop labetalol. Metoprolol tartrate 50 mg BID. Same amlodipine and HyCT. Exercise nuclear stress as o/p and see me in 2 weeks for f/u.       Anton Winston MD  4/25/2023  11:37 AM

## 2023-04-26 ENCOUNTER — TELEPHONE (OUTPATIENT)
Facility: CLINIC | Age: 73
End: 2023-04-26

## 2023-04-26 ENCOUNTER — PATIENT OUTREACH (OUTPATIENT)
Dept: CASE MANAGEMENT | Age: 73
End: 2023-04-26

## 2023-04-26 DIAGNOSIS — Z02.9 ENCOUNTERS FOR UNSPECIFIED ADMINISTRATIVE PURPOSE: ICD-10-CM

## 2023-04-26 DIAGNOSIS — I48.92 ATRIAL FLUTTER WITH RAPID VENTRICULAR RESPONSE (HCC): ICD-10-CM

## 2023-04-26 PROCEDURE — 1111F DSCHRG MED/CURRENT MED MERGE: CPT

## 2023-04-26 NOTE — TELEPHONE ENCOUNTER
JAZLYN, Spoke to pt for TCM today. Pt declined to schedule with PCP at this time stating that he schedule through my chart. TCM/HFU appt recommended by 5/9/23. Please reach out to patient to try to schedule HFU. Thank you.        BOOK BY DATE (last date for TCM): 5/9/23

## 2023-04-27 ENCOUNTER — PATIENT OUTREACH (OUTPATIENT)
Dept: CASE MANAGEMENT | Age: 73
End: 2023-04-27

## 2023-04-27 NOTE — PROGRESS NOTES
VM received; pt requesting assistance w/scheduling apt (dc 04/25)    Dr Hector Wheeler, Cardiac Electrophysiology  [facility]  [address]  [address] [facility]  Internal Medicine  [address] [place] [address] Lucas  [facility]  [phone]  Follow up 2 weeks

## 2023-04-27 NOTE — PROGRESS NOTES
Dr Altamirano [facility]  Internal Medicine  [address] [place] [address]  [address]  2nd floor  [facility]   [phone]  Follow up 2 weeks  Apt: May 10 @10:20am     Confirmed w/ pt  Closing encounter

## 2023-05-05 ENCOUNTER — PATIENT OUTREACH (OUTPATIENT)
Dept: CASE MANAGEMENT | Age: 73
End: 2023-05-05

## 2023-05-05 ENCOUNTER — OFFICE VISIT (OUTPATIENT)
Dept: SURGERY | Facility: CLINIC | Age: 73
End: 2023-05-05

## 2023-05-05 DIAGNOSIS — N40.1 BPH WITH OBSTRUCTION/LOWER URINARY TRACT SYMPTOMS: Primary | ICD-10-CM

## 2023-05-05 DIAGNOSIS — N13.8 BPH WITH OBSTRUCTION/LOWER URINARY TRACT SYMPTOMS: Primary | ICD-10-CM

## 2023-05-05 PROCEDURE — 1111F DSCHRG MED/CURRENT MED MERGE: CPT | Performed by: UROLOGY

## 2023-05-05 PROCEDURE — 99213 OFFICE O/P EST LOW 20 MIN: CPT | Performed by: UROLOGY

## 2023-05-05 RX ORDER — OXYBUTYNIN CHLORIDE 10 MG/1
10 TABLET, EXTENDED RELEASE ORAL DAILY
Qty: 90 TABLET | Refills: 3 | Status: SHIPPED | OUTPATIENT
Start: 2023-05-05

## 2023-05-05 NOTE — PROGRESS NOTES
Pt wondering why he did not see apt w/ Dr Corinne Michael on [place].   Advised pt that [facility] apts do not appear on mychart, and that [facility] has their own portal.   Pt verbalized understanding  Closing encounter

## 2023-05-05 NOTE — PROGRESS NOTES
VM received; pt requesting assistance w/confirming apt w/Dr Kei Martinez (dc 04/25)    Dr Gauri Damon, Cardiac Electrophysiology  [facility]  [address]  [address]  [phone]

## 2023-05-10 ENCOUNTER — HOSPITAL ENCOUNTER (OUTPATIENT)
Dept: GENERAL RADIOLOGY | Facility: HOSPITAL | Age: 73
Discharge: HOME | End: 2023-05-10
Attending: INTERNAL MEDICINE
Payer: MEDICARE

## 2023-05-10 ENCOUNTER — OFFICE VISIT (OUTPATIENT)
Dept: INTERNAL MEDICINE CLINIC | Facility: CLINIC | Age: 73
End: 2023-05-10

## 2023-05-10 VITALS
SYSTOLIC BLOOD PRESSURE: 130 MMHG | WEIGHT: 265 LBS | TEMPERATURE: 97 F | DIASTOLIC BLOOD PRESSURE: 84 MMHG | BODY MASS INDEX: 35.12 KG/M2 | RESPIRATION RATE: 18 BRPM | OXYGEN SATURATION: 97 % | HEART RATE: 65 BPM | HEIGHT: 73 IN

## 2023-05-10 DIAGNOSIS — M79.641 RIGHT HAND PAIN: ICD-10-CM

## 2023-05-10 DIAGNOSIS — I10 ESSENTIAL HYPERTENSION: ICD-10-CM

## 2023-05-10 DIAGNOSIS — I48.92 ATRIAL FLUTTER, UNSPECIFIED TYPE (HCC): Primary | ICD-10-CM

## 2023-05-10 PROCEDURE — 99495 TRANSJ CARE MGMT MOD F2F 14D: CPT | Performed by: INTERNAL MEDICINE

## 2023-05-10 PROCEDURE — 73130 X-RAY EXAM OF HAND: CPT | Performed by: INTERNAL MEDICINE

## 2023-05-10 PROCEDURE — 1111F DSCHRG MED/CURRENT MED MERGE: CPT | Performed by: INTERNAL MEDICINE

## 2023-05-10 PROCEDURE — 1126F AMNT PAIN NOTED NONE PRSNT: CPT | Performed by: INTERNAL MEDICINE

## 2023-05-10 RX ORDER — RIVAROXABAN 20 MG/1
25 TABLET, FILM COATED ORAL
COMMUNITY
Start: 2023-04-26

## 2023-05-19 ENCOUNTER — HOSPITAL ENCOUNTER (EMERGENCY)
Facility: HOSPITAL | Age: 73
Discharge: HOME | End: 2023-05-19
Attending: STUDENT IN AN ORGANIZED HEALTH CARE EDUCATION/TRAINING PROGRAM
Payer: MEDICARE

## 2023-05-19 VITALS
HEART RATE: 74 BPM | OXYGEN SATURATION: 97 % | RESPIRATION RATE: 18 BRPM | WEIGHT: 264 LBS | BODY MASS INDEX: 35 KG/M2 | SYSTOLIC BLOOD PRESSURE: 165 MMHG | TEMPERATURE: 97 F | DIASTOLIC BLOOD PRESSURE: 102 MMHG

## 2023-05-19 DIAGNOSIS — R04.0 EPISTAXIS: Primary | ICD-10-CM

## 2023-05-19 PROCEDURE — 30903 CONTROL OF NOSEBLEED: CPT

## 2023-05-19 PROCEDURE — 99284 EMERGENCY DEPT VISIT MOD MDM: CPT

## 2023-05-19 RX ORDER — OXYMETAZOLINE HYDROCHLORIDE 0.05 G/100ML
1 SPRAY NASAL EVERY 12 HOURS PRN
Status: DISCONTINUED | OUTPATIENT
Start: 2023-05-19 | End: 2023-05-19

## 2023-05-19 RX ORDER — TRANEXAMIC ACID 100 MG/ML
5 INJECTION, SOLUTION INTRAVENOUS ONCE
Status: COMPLETED | OUTPATIENT
Start: 2023-05-19 | End: 2023-05-19

## 2023-05-19 RX ORDER — OXYMETAZOLINE HYDROCHLORIDE 0.05 G/100ML
1 SPRAY NASAL ONCE
Status: COMPLETED | OUTPATIENT
Start: 2023-05-19 | End: 2023-05-19

## 2023-05-19 NOTE — ED INITIAL ASSESSMENT (HPI)
Patient with c/o nose bleed that began 30 mins PTA, on blood thinners, nasal clamp applied in triage.

## 2023-05-19 NOTE — ED QUICK NOTES
Pt states still oozing to back of throat. No bleeding noted when visualizing back of throat. Pt has clear drainage to nose. Will cont. To monitor.

## 2023-05-23 ENCOUNTER — PATIENT OUTREACH (OUTPATIENT)
Dept: CASE MANAGEMENT | Age: 73
End: 2023-05-23

## 2023-05-24 ENCOUNTER — OFFICE VISIT (OUTPATIENT)
Dept: OTOLARYNGOLOGY | Facility: CLINIC | Age: 73
End: 2023-05-24

## 2023-05-24 DIAGNOSIS — R04.0 EPISTAXIS: Primary | ICD-10-CM

## 2023-05-24 PROCEDURE — 1111F DSCHRG MED/CURRENT MED MERGE: CPT | Performed by: STUDENT IN AN ORGANIZED HEALTH CARE EDUCATION/TRAINING PROGRAM

## 2023-05-24 PROCEDURE — 31238 NSL/SINS NDSC SRG NSL HEMRRG: CPT | Performed by: STUDENT IN AN ORGANIZED HEALTH CARE EDUCATION/TRAINING PROGRAM

## 2023-05-24 PROCEDURE — 99204 OFFICE O/P NEW MOD 45 MIN: CPT | Performed by: STUDENT IN AN ORGANIZED HEALTH CARE EDUCATION/TRAINING PROGRAM

## 2023-05-24 RX ORDER — OXYMETAZOLINE HYDROCHLORIDE 0.05 G/100ML
2 SPRAY NASAL AS NEEDED
Qty: 30 ML | Refills: 0 | Status: SHIPPED | OUTPATIENT
Start: 2023-05-24

## 2023-05-25 ENCOUNTER — HOSPITAL ENCOUNTER (INPATIENT)
Facility: HOSPITAL | Age: 73
LOS: 1 days | Discharge: HOME | End: 2023-05-26
Attending: EMERGENCY MEDICINE | Admitting: HOSPITALIST
Payer: MEDICARE

## 2023-05-25 ENCOUNTER — APPOINTMENT (OUTPATIENT)
Dept: GENERAL RADIOLOGY | Facility: HOSPITAL | Age: 73
End: 2023-05-25
Payer: MEDICARE

## 2023-05-25 DIAGNOSIS — I48.91 ATRIAL FIBRILLATION AND FLUTTER (HCC): Primary | ICD-10-CM

## 2023-05-25 DIAGNOSIS — I48.92 ATRIAL FIBRILLATION AND FLUTTER (HCC): Primary | ICD-10-CM

## 2023-05-25 PROBLEM — R79.89 AZOTEMIA: Status: ACTIVE | Noted: 2023-05-25

## 2023-05-25 PROBLEM — E87.6 HYPOKALEMIA: Status: ACTIVE | Noted: 2023-05-25

## 2023-05-25 PROBLEM — R73.9 HYPERGLYCEMIA: Status: ACTIVE | Noted: 2023-05-25

## 2023-05-25 LAB
ALBUMIN SERPL-MCNC: 3.8 G/DL (ref 3.4–5)
ALBUMIN/GLOB SERPL: 1.1 {RATIO} (ref 1–2)
ALP LIVER SERPL-CCNC: 73 U/L
ALT SERPL-CCNC: 24 U/L
ANION GAP SERPL CALC-SCNC: 8 MMOL/L (ref 0–18)
APTT PPP: 40.5 SECONDS (ref 23.3–35.6)
AST SERPL-CCNC: 21 U/L (ref 15–37)
BASOPHILS # BLD AUTO: 0.03 X10(3) UL (ref 0–0.2)
BASOPHILS NFR BLD AUTO: 0.3 %
BILIRUB SERPL-MCNC: 0.5 MG/DL (ref 0.1–2)
BUN BLD-MCNC: 44 MG/DL (ref 7–18)
BUN/CREAT SERPL: 27.5 (ref 10–20)
CALCIUM BLD-MCNC: 9.6 MG/DL (ref 8.5–10.1)
CHLORIDE SERPL-SCNC: 110 MMOL/L (ref 98–112)
CO2 SERPL-SCNC: 24 MMOL/L (ref 21–32)
CREAT BLD-MCNC: 1.6 MG/DL
DEPRECATED RDW RBC AUTO: 43.2 FL (ref 35.1–46.3)
EOSINOPHIL # BLD AUTO: 0.04 X10(3) UL (ref 0–0.7)
EOSINOPHIL NFR BLD AUTO: 0.4 %
ERYTHROCYTE [DISTWIDTH] IN BLOOD BY AUTOMATED COUNT: 14.1 % (ref 11–15)
GFR SERPLBLD BASED ON 1.73 SQ M-ARVRAT: 45 ML/MIN/1.73M2 (ref 60–?)
GLOBULIN PLAS-MCNC: 3.4 G/DL (ref 2.8–4.4)
GLUCOSE BLD-MCNC: 171 MG/DL (ref 70–99)
HCT VFR BLD AUTO: 41.6 %
HGB BLD-MCNC: 14.2 G/DL
IMM GRANULOCYTES # BLD AUTO: 0.04 X10(3) UL (ref 0–1)
IMM GRANULOCYTES NFR BLD: 0.4 %
INR BLD: 2 (ref 0.85–1.16)
LYMPHOCYTES # BLD AUTO: 2.35 X10(3) UL (ref 1–4)
LYMPHOCYTES NFR BLD AUTO: 22.1 %
MAGNESIUM SERPL-MCNC: 1.8 MG/DL (ref 1.6–2.6)
MCH RBC QN AUTO: 28.5 PG (ref 26–34)
MCHC RBC AUTO-ENTMCNC: 34.1 G/DL (ref 31–37)
MCV RBC AUTO: 83.5 FL
MONOCYTES # BLD AUTO: 0.88 X10(3) UL (ref 0.1–1)
MONOCYTES NFR BLD AUTO: 8.3 %
NEUTROPHILS # BLD AUTO: 7.31 X10 (3) UL (ref 1.5–7.7)
NEUTROPHILS # BLD AUTO: 7.31 X10(3) UL (ref 1.5–7.7)
NEUTROPHILS NFR BLD AUTO: 68.5 %
OSMOLALITY SERPL CALC.SUM OF ELEC: 309 MOSM/KG (ref 275–295)
PLATELET # BLD AUTO: 256 10(3)UL (ref 150–450)
POTASSIUM SERPL-SCNC: 3 MMOL/L (ref 3.5–5.1)
PROT SERPL-MCNC: 7.2 G/DL (ref 6.4–8.2)
PROTHROMBIN TIME: 22.3 SECONDS (ref 11.6–14.8)
RBC # BLD AUTO: 4.98 X10(6)UL
SODIUM SERPL-SCNC: 142 MMOL/L (ref 136–145)
TROPONIN I HIGH SENSITIVITY: 16 NG/L
WBC # BLD AUTO: 10.7 X10(3) UL (ref 4–11)

## 2023-05-25 PROCEDURE — 71045 X-RAY EXAM CHEST 1 VIEW: CPT | Performed by: EMERGENCY MEDICINE

## 2023-05-25 RX ORDER — METOPROLOL TARTRATE 50 MG/1
50 TABLET, FILM COATED ORAL ONCE
Status: COMPLETED | OUTPATIENT
Start: 2023-05-25 | End: 2023-05-25

## 2023-05-25 RX ORDER — AMIODARONE HYDROCHLORIDE 50 MG/ML
150 INJECTION, SOLUTION INTRAVENOUS ONCE
Status: COMPLETED | OUTPATIENT
Start: 2023-05-25 | End: 2023-05-25

## 2023-05-25 RX ORDER — METOPROLOL TARTRATE 5 MG/5ML
2.5 INJECTION INTRAVENOUS ONCE
Status: COMPLETED | OUTPATIENT
Start: 2023-05-25 | End: 2023-05-25

## 2023-05-25 RX ORDER — POTASSIUM CHLORIDE 20 MEQ/1
40 TABLET, EXTENDED RELEASE ORAL ONCE
Status: COMPLETED | OUTPATIENT
Start: 2023-05-25 | End: 2023-05-25

## 2023-05-26 ENCOUNTER — APPOINTMENT (OUTPATIENT)
Dept: INTERVENTIONAL RADIOLOGY/VASCULAR | Facility: HOSPITAL | Age: 73
End: 2023-05-26
Attending: STUDENT IN AN ORGANIZED HEALTH CARE EDUCATION/TRAINING PROGRAM
Payer: MEDICARE

## 2023-05-26 ENCOUNTER — APPOINTMENT (OUTPATIENT)
Dept: CV DIAGNOSTICS | Facility: HOSPITAL | Age: 73
End: 2023-05-26
Attending: INTERNAL MEDICINE
Payer: MEDICARE

## 2023-05-26 ENCOUNTER — APPOINTMENT (OUTPATIENT)
Dept: NUCLEAR MEDICINE | Facility: HOSPITAL | Age: 73
End: 2023-05-26
Attending: INTERNAL MEDICINE
Payer: MEDICARE

## 2023-05-26 VITALS
DIASTOLIC BLOOD PRESSURE: 84 MMHG | RESPIRATION RATE: 18 BRPM | SYSTOLIC BLOOD PRESSURE: 138 MMHG | TEMPERATURE: 98 F | BODY MASS INDEX: 35 KG/M2 | WEIGHT: 261.69 LBS | HEART RATE: 60 BPM | OXYGEN SATURATION: 98 %

## 2023-05-26 LAB
% OF MAX PREDICTED HR: 100 %
ANION GAP SERPL CALC-SCNC: 8 MMOL/L (ref 0–18)
BASOPHILS # BLD AUTO: 0.02 X10(3) UL (ref 0–0.2)
BASOPHILS NFR BLD AUTO: 0.2 %
BUN BLD-MCNC: 46 MG/DL (ref 7–18)
BUN/CREAT SERPL: 28.8 (ref 10–20)
CALCIUM BLD-MCNC: 9.2 MG/DL (ref 8.5–10.1)
CHLORIDE SERPL-SCNC: 111 MMOL/L (ref 98–112)
CO2 SERPL-SCNC: 24 MMOL/L (ref 21–32)
CREAT BLD-MCNC: 1.6 MG/DL
DEPRECATED RDW RBC AUTO: 43.6 FL (ref 35.1–46.3)
EOSINOPHIL # BLD AUTO: 0.07 X10(3) UL (ref 0–0.7)
EOSINOPHIL NFR BLD AUTO: 0.8 %
ERYTHROCYTE [DISTWIDTH] IN BLOOD BY AUTOMATED COUNT: 14.2 % (ref 11–15)
GFR SERPLBLD BASED ON 1.73 SQ M-ARVRAT: 45 ML/MIN/1.73M2 (ref 60–?)
GLUCOSE BLD-MCNC: 169 MG/DL (ref 70–99)
HCT VFR BLD AUTO: 39.7 %
HGB BLD-MCNC: 13.4 G/DL
IMM GRANULOCYTES # BLD AUTO: 0.02 X10(3) UL (ref 0–1)
IMM GRANULOCYTES NFR BLD: 0.2 %
LYMPHOCYTES # BLD AUTO: 2.2 X10(3) UL (ref 1–4)
LYMPHOCYTES NFR BLD AUTO: 25.6 %
MAGNESIUM SERPL-MCNC: 1.8 MG/DL (ref 1.6–2.6)
MAX DIASTOLIC BP: 98 MMHG
MAX HEART RATE: 146 BPM
MAX PREDICTED HEART RATE: 146 BPM
MAX SYSTOLIC BP: 145 MMHG
MAX WORK LOAD: 10
MCH RBC QN AUTO: 28.2 PG (ref 26–34)
MCHC RBC AUTO-ENTMCNC: 33.8 G/DL (ref 31–37)
MCV RBC AUTO: 83.4 FL
MONOCYTES # BLD AUTO: 0.93 X10(3) UL (ref 0.1–1)
MONOCYTES NFR BLD AUTO: 10.8 %
NEUTROPHILS # BLD AUTO: 5.35 X10 (3) UL (ref 1.5–7.7)
NEUTROPHILS # BLD AUTO: 5.35 X10(3) UL (ref 1.5–7.7)
NEUTROPHILS NFR BLD AUTO: 62.4 %
OSMOLALITY SERPL CALC.SUM OF ELEC: 312 MOSM/KG (ref 275–295)
PLATELET # BLD AUTO: 217 10(3)UL (ref 150–450)
POTASSIUM SERPL-SCNC: 3.2 MMOL/L (ref 3.5–5.1)
POTASSIUM SERPL-SCNC: 3.3 MMOL/L (ref 3.5–5.1)
Q-T INTERVAL: 292 MS
QRS DURATION: 84 MS
QTC CALCULATION (BEZET): 450 MS
R AXIS: 9 DEGREES
RBC # BLD AUTO: 4.76 X10(6)UL
SODIUM SERPL-SCNC: 143 MMOL/L (ref 136–145)
T AXIS: 187 DEGREES
VENTRICULAR RATE: 143 BPM
WBC # BLD AUTO: 8.6 X10(3) UL (ref 4–11)

## 2023-05-26 PROCEDURE — 93017 CV STRESS TEST TRACING ONLY: CPT | Performed by: INTERNAL MEDICINE

## 2023-05-26 PROCEDURE — 5A2204Z RESTORATION OF CARDIAC RHYTHM, SINGLE: ICD-10-PCS | Performed by: STUDENT IN AN ORGANIZED HEALTH CARE EDUCATION/TRAINING PROGRAM

## 2023-05-26 PROCEDURE — 78452 HT MUSCLE IMAGE SPECT MULT: CPT | Performed by: INTERNAL MEDICINE

## 2023-05-26 PROCEDURE — 99239 HOSP IP/OBS DSCHRG MGMT >30: CPT | Performed by: HOSPITALIST

## 2023-05-26 RX ORDER — DILTIAZEM HYDROCHLORIDE 120 MG/1
240 CAPSULE, EXTENDED RELEASE ORAL DAILY
Status: DISCONTINUED | OUTPATIENT
Start: 2023-05-26 | End: 2023-05-26

## 2023-05-26 RX ORDER — METOPROLOL TARTRATE 50 MG/1
50 TABLET, FILM COATED ORAL
Status: DISCONTINUED | OUTPATIENT
Start: 2023-05-26 | End: 2023-05-26

## 2023-05-26 RX ORDER — REGADENOSON 0.08 MG/ML
INJECTION, SOLUTION INTRAVENOUS
Status: COMPLETED
Start: 2023-05-26 | End: 2023-05-26

## 2023-05-26 RX ORDER — SODIUM CHLORIDE 9 MG/ML
INJECTION, SOLUTION INTRAVENOUS CONTINUOUS
Status: DISCONTINUED | OUTPATIENT
Start: 2023-05-26 | End: 2023-05-26

## 2023-05-26 RX ORDER — POTASSIUM CHLORIDE 20 MEQ/1
40 TABLET, EXTENDED RELEASE ORAL EVERY 4 HOURS
Status: COMPLETED | OUTPATIENT
Start: 2023-05-26 | End: 2023-05-26

## 2023-05-26 RX ORDER — AMIODARONE HYDROCHLORIDE 200 MG/1
TABLET ORAL
Qty: 74 TABLET | Refills: 0 | Status: SHIPPED | OUTPATIENT
Start: 2023-05-26 | End: 2023-06-25

## 2023-05-26 RX ORDER — ONDANSETRON 2 MG/ML
4 INJECTION INTRAMUSCULAR; INTRAVENOUS EVERY 6 HOURS PRN
Status: DISCONTINUED | OUTPATIENT
Start: 2023-05-26 | End: 2023-05-26

## 2023-05-26 RX ORDER — OXYMETAZOLINE HYDROCHLORIDE 0.05 G/100ML
2 SPRAY NASAL AS NEEDED
Status: DISCONTINUED | OUTPATIENT
Start: 2023-05-26 | End: 2023-05-26

## 2023-05-26 RX ORDER — DILTIAZEM HYDROCHLORIDE 240 MG/1
240 CAPSULE, COATED, EXTENDED RELEASE ORAL DAILY
Qty: 30 CAPSULE | Refills: 0 | Status: SHIPPED | OUTPATIENT
Start: 2023-05-27

## 2023-05-26 RX ORDER — METOPROLOL TARTRATE 5 MG/5ML
5 INJECTION INTRAVENOUS ONCE
Status: COMPLETED | OUTPATIENT
Start: 2023-05-26 | End: 2023-05-26

## 2023-05-26 RX ORDER — ATORVASTATIN CALCIUM 20 MG/1
20 TABLET, FILM COATED ORAL NIGHTLY
Status: DISCONTINUED | OUTPATIENT
Start: 2023-05-26 | End: 2023-05-26

## 2023-05-26 RX ORDER — MAGNESIUM OXIDE 400 MG/1
400 TABLET ORAL ONCE
Status: COMPLETED | OUTPATIENT
Start: 2023-05-26 | End: 2023-05-26

## 2023-05-26 RX ORDER — METHOHEXITAL IN WATER/PF 100MG/10ML
SYRINGE (ML) INTRAVENOUS
Status: COMPLETED
Start: 2023-05-26 | End: 2023-05-26

## 2023-05-26 RX ORDER — ATORVASTATIN CALCIUM 20 MG/1
20 TABLET, FILM COATED ORAL NIGHTLY
Qty: 30 TABLET | Refills: 0 | Status: SHIPPED | OUTPATIENT
Start: 2023-05-26

## 2023-05-26 RX ORDER — METHOHEXITAL IN WATER/PF 100MG/10ML
40 SYRINGE (ML) INTRAVENOUS ONCE
Status: COMPLETED | OUTPATIENT
Start: 2023-05-26 | End: 2023-05-26

## 2023-05-26 RX ORDER — OXYBUTYNIN CHLORIDE 5 MG/1
10 TABLET, EXTENDED RELEASE ORAL DAILY
Status: DISCONTINUED | OUTPATIENT
Start: 2023-05-26 | End: 2023-05-26

## 2023-05-26 NOTE — PLAN OF CARE
Patient had a stress test and will have her cardioversion today. Plan is to be discharged to home today after cardioversion, waiting for medical decision. Patient denies pain. Frequent rounds. Patient's personal belongings and visit of friend at bedside. Call light within reach. Bed in lowest and locked position. Problem: Patient Centered Care  Goal: Patient preferences are identified and integrated in the patient's plan of care  Description: Interventions:  - What would you like us to know as we care for you? From home with wife  - Provide timely, complete, and accurate information to patient/family  - Incorporate patient and family knowledge, values, beliefs, and cultural backgrounds into the planning and delivery of care  - Encourage patient/family to participate in care and decision-making at the level they choose  - Honor patient and family perspectives and choices  Outcome: Progressing     Problem: CARDIOVASCULAR - ADULT  Goal: Maintains optimal cardiac output and hemodynamic stability  Description: INTERVENTIONS:  - Monitor vital signs, rhythm, and trends  - Monitor for bleeding, hypotension and signs of decreased cardiac output  - Evaluate effectiveness of vasoactive medications to optimize hemodynamic stability  - Monitor arterial and/or venous puncture sites for bleeding and/or hematoma  - Assess quality of pulses, skin color and temperature  - Assess for signs of decreased coronary artery perfusion - ex.  Angina  - Evaluate fluid balance, assess for edema, trend weights  Outcome: Progressing  Goal: Absence of cardiac arrhythmias or at baseline  Description: INTERVENTIONS:  - Continuous cardiac monitoring, monitor vital signs, obtain 12 lead EKG if indicated  - Evaluate effectiveness of antiarrhythmic and heart rate control medications as ordered  - Initiate emergency measures for life threatening arrhythmias  - Monitor electrolytes and administer replacement therapy as ordered  Outcome: Progressing

## 2023-05-26 NOTE — DISCHARGE INSTRUCTIONS
Ok to go home [facility]  Cardiology to do all [employer] and determine dosing and length of tx  No driving for 24 hrs     Medication List        START taking these medications      atorvastatin 20 MG Tabs  Commonly known as: Lipitor  Take 1 tablet (20 mg total) by mouth nightly. Watch muscle weakness  Replaces: simvastatin 40 MG Tabs     dilTIAZem  MG Cp24  Commonly known as: CardIZEM CD  Take 1 capsule (240 mg total) by mouth daily. Start taking on: May 27, 2023            CHANGE how you take these medications      rivaroxaban 15 MG Tabs  Commonly known as: Xarelto  Take 1 tablet (15 mg total) by mouth daily with food. Start taking on: May 27, 2023  What changed:   medication strength  how much to take  Another medication with the same name was removed. Continue taking this medication, and follow the directions you see here. CONTINUE taking these medications      metoprolol tartrate 50 MG Tabs  Commonly known as: Lopressor  Take 1 tablet (50 mg total) by mouth 2x Daily(Beta Blocker). MULTIVITAMIN ADULT OR     oxybutynin ER 10 MG Tb24  Commonly known as: Ditropan-XL  Take 1 tablet (10 mg total) by mouth daily. oxymetazoline 0.05 % Soln  Commonly known as: Nasal Decongestant  2 sprays by Nasal route as needed for congestion.  each nostril morning and evening            STOP taking these medications      amLODIPine 10 MG Tabs  Commonly known as: Norvasc     hydroCHLOROthiazide 12.5 MG Tabs  Commonly known as: HYDRODIURIL     lisinopril 40 MG Tabs  Commonly known as: Prinivil; Zestril     simvastatin 40 MG Tabs  Commonly known as: Zocor  Replaced by: atorvastatin 20 MG Tabs               Where to Get Your Medications        These medications were sent to [facility]  #[id number] [facility], [phone], [address] [place] [address] 17, [address] [facility] [address]      Phone: [phone]   atorvastatin 20 MG Tabs  dilTIAZem  MG Cp24  rivaroxaban 15 MG Tabs

## 2023-05-26 NOTE — ED QUICK NOTES
Orders for admission, patient is aware of plan and ready to go upstairs. Any questions, please call ED RN Diana AWAN at extension [phone].      Patient Covid vaccination status: Fully vaccinated and immunocompromised     COVID Test Ordered in ED: None    COVID Suspicion at Admission: N/A    Running Infusions:  None    Mental Status/LOC at time of transport: A&Ox4    Other pertinent information:   CIWA score: N/A   NIH score:  N/A

## 2023-05-26 NOTE — ED INITIAL ASSESSMENT (HPI)
Diagnosed with a flutter a month ago. He had an electric cardioversion in April. He states today his heart rate has been all over the place.

## 2023-05-26 NOTE — H&P
[facility]    PATIENT'S NAME: Cherri TAVERAS   ATTENDING PHYSICIAN: Sawyer Hanna MD   PATIENT ACCOUNT#:   [id number]    LOCATION:  [address] [place], [place] [address] Road #:   [id number]       YOB: 1950  ADMISSION DATE:       05/25/2023    HISTORY AND PHYSICAL EXAMINATION    DATE OF EXAMINATION:  05/26/2023    CHIEF COMPLAINT:  Palpitations. HISTORY OF PRESENT ILLNESS:  This is a very pleasant 77-year-old gentleman who was diagnosed with atrial flutter about a month ago. At that time he had felt his heart racing on and off.  ________ EKG revealed atrial flutter. He was admitted overnight. He underwent a TED and successful cardioversion. He was discharged on Eliquis and metoprolol. He said that 2 weeks later he saw Cardiology in followup and was scheduled to have a stress test and a Holter monitor placed on May 27. Last Friday, 1 week ago, he and his wife started to drive to [place], [place], when he developed a nosebleed. He says he returned and had his nose packed. While in [place] the packing was removed 2 days later with no further signs of bleeding. He followed up with ENT and apparently had some cauterization of the nose done. He reports that he has been feeling weak and noticed palpitations on the day of admission. He did feel some sort of sensation in his chest, but not a true pain. No dizziness or lightheadedness. He does sometimes get mildly short of breath. He apparently did not take his metoprolol the morning of his admission. He denied any fever, chills, or URI symptoms. His workup in the emergency room included an EKG that revealed atrial fibrillation with a rapid ventricular response. His chest x-ray revealed no acute cardiopulmonary disease. Glucose was 169, sodium 143, potassium 3.2, chloride 111, CO2 of 24, BUN of 46 with a creatinine of 1.60, calcium 9.2, anion gap of 8. Liver function tests were essentially normal.  PT was 22.3, INR 2.0, PTT 40.5. White count was 10.7 with a hemoglobin of 14.2 and a platelet count of 005,197; there were 68% neutrophils. The patient was noted to have multiple episodes of nonsustained ventricular tachycardia while in the emergency room. His troponin was normal.  Ultimately, he was placed on amiodarone drip because of the ventricular tachycardia and admitted to cardiac telemetry for further evaluation and treatment. Dr. Yuni Mares was consulted through the emergency room. PAST MEDICAL HISTORY:  Significant for cholecystitis; epidermal inclusion cyst on his back which was excised in ; hyperlipidemia; hypertension; osteoarthritis; prostate enlargement; renal cell carcinoma, status post partial left nephrectomy; cholecystectomy; right total knee arthroplasty in ; left total knee arthroplasty was done previously as well; recent diagnosis of atrial flutter. MEDICATIONS:  Prior to admission:  Metoprolol 50 mg twice a day; hydrochlorothiazide 12.5 mg daily; simvastatin 40 mg nightly; lisinopril 40 mg daily; amlodipine 10 mg daily; multivitamin once a day; oxymetazoline 0.05% nasal solution 2 sprays as needed for congestion twice a day; Xarelto 25 mg daily with food; oxybutynin 10 mg daily. ALLERGIES:  No known drug allergies. FAMILY HISTORY:  The patient's mother  in her late 46s of breast cancer. His father  at 79 of complications of alcoholism; he fell, hitting his head and sustained intracranial bleed. SOCIAL HISTORY:  The patient does not smoke. He drinks about 4 glasses of wine per week or less. No history of drug use. Was a  and actor. REVIEW OF SYSTEMS:  Twelve systems were reviewed. Patient reports fatigue. Denies any fevers, chills. No nausea, vomiting, diarrhea, constipation, melena, or hematochezia. No dysuria or increased frequency of urination.   There are otherwise no additional pertinent positives or negatives on the 12-point review of systems except as listed in the History of Present Illness. PHYSICAL EXAMINATION:    VITAL SIGNS:  Temperature was 98.2, pulse 100, respiratory rate 22, blood pressure 114/85, O2 saturation 97% on room air. HEENT:  Normocephalic, atraumatic. Pupils equal, reactive. Sclerae anicteric. There was no sinus tenderness. Mucous membranes were moist.  NECK:  Supple. LUNGS:  Clear with easy respiratory excursions. No wheezes or rhonchi. HEART:  Irregularly irregular rate and rhythm. Normal S1, S2.  ABDOMEN:  Soft, nontender, with normoactive bowel sounds. No guarding. No rebound. EXTREMITIES:  No clubbing, cyanosis, calf tenderness, palpable cords, or edema. SKIN:  Warm and dry without any significant rashes. NEUROLOGIC:  Patient was awake, alert, oriented x3. No focal motor or sensory deficit, although his gait was not tested. PSYCHIATRIC:  His mood and affect were pleasant and cooperative. BACK:  There was no costovertebral angle tenderness. LABORATORY DATA:  Previously mentioned. ASSESSMENT AND PLAN:    1. Atrial fibrillation with rapid ventricular response in a patient who previously underwent cardioversion while in atrial flutter. Continue IV amiodarone pending evaluation by Cardiology. Await their recommendations. 2.   Anticoagulation with Xarelto for atrial fibrillation. Continue. 3.   Hyperlipidemia. Continue statin. 4.   Hypokalemia. Replace. 5.   Check magnesium level and replace if necessary. 6.   Acute kidney injury. Hold diuretic. Continue to monitor. 7.   DVT prophylaxis. Patient on Xarelto. 8.   The patient's current clinical status and proposed treatment plan were discussed with him. All of his questions were answered and he agreed with the plan of care as outlined above.     Dictated By Reina Herrera MD  d: 05/26/2023 07:02:33  t: 05/26/2023 08:16:11  Job [id number]  [facility]/

## 2023-05-26 NOTE — PLAN OF CARE
Pt a/ox4, RA. On bedrest per md orders. Amio drip running. No complaint overnight. Call light in reach, oriented to unit and plan of care. Problem: Patient Centered Care  Goal: Patient preferences are identified and integrated in the patient's plan of care  Description: Interventions:  - What would you like us to know as we care for you? From home with wife  - Provide timely, complete, and accurate information to patient/family  - Incorporate patient and family knowledge, values, beliefs, and cultural backgrounds into the planning and delivery of care  - Encourage patient/family to participate in care and decision-making at the level they choose  - Honor patient and family perspectives and choices  Outcome: Progressing     Problem: CARDIOVASCULAR - ADULT  Goal: Maintains optimal cardiac output and hemodynamic stability  Description: INTERVENTIONS:  - Monitor vital signs, rhythm, and trends  - Monitor for bleeding, hypotension and signs of decreased cardiac output  - Evaluate effectiveness of vasoactive medications to optimize hemodynamic stability  - Monitor arterial and/or venous puncture sites for bleeding and/or hematoma  - Assess quality of pulses, skin color and temperature  - Assess for signs of decreased coronary artery perfusion - ex.  Angina  - Evaluate fluid balance, assess for edema, trend weights  Outcome: Progressing  Goal: Absence of cardiac arrhythmias or at baseline  Description: INTERVENTIONS:  - Continuous cardiac monitoring, monitor vital signs, obtain 12 lead EKG if indicated  - Evaluate effectiveness of antiarrhythmic and heart rate control medications as ordered  - Initiate emergency measures for life threatening arrhythmias  - Monitor electrolytes and administer replacement therapy as ordered  Outcome: Progressing

## 2023-05-26 NOTE — PROGRESS NOTES
Patient Discharged to home. Discharge report given to patient,written and verbal. All questions answered with verbalized understanding of report and instructions. IV and Tele removed. No belongings left at bedside. Nurse  cleaned.

## 2023-05-26 NOTE — ED PROVIDER NOTES
Signed out by previous shift. Patient is awaiting transfer to telemetry bed. Patient had another run of 10 beats of V. tach. He was given metoprolol and potassium earlier. Will give 150 mg of IV amiodarone at this time. Patient remained asymptomatic throughout V. tach run.

## 2023-05-26 NOTE — DISCHARGE SUMMARY
Dc summary#[id number]  > 30 min spent on [address] Discharge Diagnoses: afib    Lace+ Score: 81  59-90 High Risk  29-58 Medium Risk  0-28   Low Risk. TCM Follow-Up Recommendation:  LACE 29-58:  Moderate Risk of readmission after discharge from the hospital.tcm fu recommended

## 2023-05-26 NOTE — PLAN OF CARE
Chart reviewed:  AFluttter rvr nabil/cv 4/2023  Sinus until recently, but epistaxis noted, s/p ent tx  Now afib rvr  Loading with amio and rate control meds    rec  amio load and rate control  Can cardiovert if no missed AC doses  Prior to discharge  Rec OV follow up, would consider pvi afib/cti flutter ablation outpt

## 2023-05-26 NOTE — IVS NOTE
Hand-Off     Procedure hand off report given to Nasima. Pt's vital signs are stable. Patient remains in NSR. Dr. Wily Rubio spoke with patient post procedure.

## 2023-05-26 NOTE — PROCEDURES
[facility]    Cardiac Electrophysiology Procedure Note    [facility]   [facility] [id number] MRN [id number]   Admission Date 5/25/2023 Procedure Date 5/26/2023   Attending Physician Rizwan Moran,* Procedure Physician Maddy Carroll MD       Pre-Operative Diagnosis: Persistent atrial fibrillation    Post-Operative Diagnosis: Persistent atrial fibrillation    Procedure Performed:    1. Direct current cardioversion    Indication: Persistent atrial fibrillation    Anesthesia: 40 mg Brevital for DCCV    Complications: None apparent    Procedural findings: The patient was brought to the procedure suite in stable and postabsorptive state after providing informed consent. A time out was performed to confirm the patient's identity and type and site of the procedure. The patient received a 200 Joule synchronized biphasic shock via anterior-posterior pads, which converted the patient to sinus rhythm. The patient was recovered by myself and staff and transferred to the recovery area in stable and comfortable condition. RESULTS:  -- Successful electrical cardioversion to sinus rhythm    PLAN:  1. Recovery by myself and nursing  2. 12 lead ECG  3. Continue anticoagulation  4. Stop IV amiodarone, oral load with 400 mg BID x 10 days, then 200 mg daily  5. Continue oral metoprolol and diltiazem  6. Activity and driving restrictions x 24 hours  7. Outpatient follow up with EP for possible ablation and/or alternative antiarrhythmic  8.  Discharge home today     Maddy Carroll MD, 05/26/23, 3:25 PM  [address] [place] [address]

## 2023-05-27 LAB
ATRIAL RATE: 72 BPM
P AXIS: 56 DEGREES
P-R INTERVAL: 196 MS
Q-T INTERVAL: 432 MS
QRS DURATION: 82 MS
QTC CALCULATION (BEZET): 473 MS
R AXIS: 14 DEGREES
T AXIS: 70 DEGREES
VENTRICULAR RATE: 72 BPM

## 2023-05-27 NOTE — DISCHARGE SUMMARY
[facility]    PATIENT'S NAME: Alexis TAVERAS   ATTENDING PHYSICIAN: Sawyer Hanna MD   PATIENT ACCOUNT#:   [id number]    LOCATION:  [address] [place], [place] [address] Road #:   [id number]       YOB: 1950  ADMISSION DATE:       05/25/2023      DISCHARGE DATE:  05/26/2023    DISCHARGE SUMMARY      About 35 minutes were spent preparing this discharge. DISCHARGE DIAGNOSIS:  Recurrent palpitations from atrial flutter, status post cardioversion. HISTORY AND HOSPITAL COURSE:  This is a very pleasant 75-year-old white male who presents with a history of having recent atrial flutter. He underwent a successful cardioversion and was discharged home on Eliquis and metoprolol. He was supposed to have a stress test and cardiology followup, but he and his wife were on a trip and he developed a nosebleed, then weak and lightheaded since then with palpitations. He came in with atrial fibrillation and rapid ventricular response, still anticoagulated, underwent a DC cardioversion by Dr. Chandler Olsen and was deemed stable to be discharged home. PHYSICAL EXAMINATION ON DISCHARGE:    VITAL SIGNS:  Temperature 97.8, pulse 60, respiratory rate 18, blood pressure 138/84, 98%. LUNGS:  Occasional rhonchi. HEART:  Normal, S1 and S2. No S3.  Regular. ABDOMEN:  Soft and nontender. EXTREMITIES:  Without edema. NEUROLOGIC:  He is alert and oriented, friendly and cooperative. He understands all my discharge instructions. LABORATORY STUDIES:  Please see chart. ASSESSMENT AND PLAN:    1. Atrial fibrillation. Now converted to sinus, hopefully this will stay. Anticoagulated with Xarelto. 2.   Obesity. BMI of 34.5. May need ALISSA workup. 3.   Hyperlipidemia. 4.   Hypokalemia. Replace. 5.   Acute kidney injury. Follow up creatinine with Dr. Dez River. 6.   DVT prophylaxis. Patient on Xarelto. CONDITION ON DISCHARGE:  Stable. CODE STATUS:  Full Code. DISCHARGE MEDICATIONS:    1. Multivitamin once a day. 2.   Metoprolol 50 mg twice a day. 3.   Oxybutynin ER 10 mg daily. 4.   Oxymetazoline 0.05% 2 sprays in each nostril as needed. 5.   Atorvastatin 20 mg nightly. Watch for muscle weakness. 6.   Xarelto 15 mg daily. Adjusted for renal function. 7.   Diltiazem  mg daily. 8.   Amiodarone 400 mg twice a day for 7 days and then 200 mg twice a day for 23 days as a maintenance dose. Please note Cardiology is responsible for initiating, maintaining, renewing, and discontinuing amiodarone. All amiodarone issues should go to Cardiology. DISCHARGE INSTRUCTIONS:  Diet is low fat, low salt. Activity is no heavy exercise, no driving, otherwise as tolerated. No driving until anesthesia wears off, hopefully until tomorrow. Followup is with Dr. Darion Jain in a few days. Followup with Dr. Sheena Councilman in 2 weeks. Return if palpitations or other issues, especially if there is any bleeding from his nose. RISK OF READMISSION:  Moderate. TCM followup recommended. Dictated By Jose Lomas.  MD Jacques  d: 05/26/2023 18:35:53  t: 05/27/2023 07:11:56  Job [id number]  [facility]/

## 2023-05-30 ENCOUNTER — PATIENT OUTREACH (OUTPATIENT)
Dept: CASE MANAGEMENT | Age: 73
End: 2023-05-30

## 2023-05-30 DIAGNOSIS — I48.91 ATRIAL FIBRILLATION AND FLUTTER (HCC): ICD-10-CM

## 2023-05-30 DIAGNOSIS — Z02.9 ENCOUNTERS FOR UNSPECIFIED ADMINISTRATIVE PURPOSE: ICD-10-CM

## 2023-05-30 DIAGNOSIS — I48.92 ATRIAL FIBRILLATION AND FLUTTER (HCC): ICD-10-CM

## 2023-05-30 PROCEDURE — 1111F DSCHRG MED/CURRENT MED MERGE: CPT

## 2023-05-30 NOTE — PROGRESS NOTES
Responding to patient's voicemail. Dr Adonis Nuñez [address]  [facility] [place]  [phone]  5/31 @ 10:20am    Dr Gerry Neal  Cardiovascular Diseases, CARDIOLOGY  [facility]  [address]  [place] [address]  [phone]  6/14 @8:40am    Patient was contacted and informed of the appointments. Closing encounter.

## 2023-05-30 NOTE — PROGRESS NOTES
VM received; pt requesting assistance w/scheduling apt (dc 05/26)    Dr Lianne Quinonez  Internal Medicine  [facility]  [address] [address]  [phone]  Follow up 3 days    Dr Herbert Loza  Cardiovascular Diseases, CARDIOLOGY  [facility]  [address]  [address] [facility] [address] [phone]  Follow up 2 weeks

## 2023-05-31 ENCOUNTER — OFFICE VISIT (OUTPATIENT)
Dept: INTERNAL MEDICINE CLINIC | Facility: CLINIC | Age: 73
End: 2023-05-31

## 2023-05-31 VITALS
SYSTOLIC BLOOD PRESSURE: 128 MMHG | HEART RATE: 64 BPM | OXYGEN SATURATION: 98 % | BODY MASS INDEX: 34.72 KG/M2 | DIASTOLIC BLOOD PRESSURE: 86 MMHG | HEIGHT: 73 IN | WEIGHT: 262 LBS | RESPIRATION RATE: 16 BRPM

## 2023-05-31 DIAGNOSIS — I48.92 ATRIAL FLUTTER WITH RAPID VENTRICULAR RESPONSE (HCC): Primary | ICD-10-CM

## 2023-05-31 DIAGNOSIS — I10 ESSENTIAL HYPERTENSION: ICD-10-CM

## 2023-08-22 ENCOUNTER — LAB ENCOUNTER (OUTPATIENT)
Dept: LAB | Facility: HOSPITAL | Age: 73
End: 2023-08-22
Attending: INTERNAL MEDICINE
Payer: MEDICARE

## 2023-08-22 DIAGNOSIS — R73.09 ELEVATED GLUCOSE: ICD-10-CM

## 2023-08-22 DIAGNOSIS — I10 ESSENTIAL HYPERTENSION: ICD-10-CM

## 2023-08-22 LAB
ANION GAP SERPL CALC-SCNC: 6 MMOL/L (ref 0–18)
BUN BLD-MCNC: 16 MG/DL (ref 7–18)
BUN/CREAT SERPL: 13.9 (ref 10–20)
CALCIUM BLD-MCNC: 8.8 MG/DL (ref 8.5–10.1)
CHLORIDE SERPL-SCNC: 112 MMOL/L (ref 98–112)
CO2 SERPL-SCNC: 27 MMOL/L (ref 21–32)
CREAT BLD-MCNC: 1.15 MG/DL
EGFRCR SERPLBLD CKD-EPI 2021: 67 ML/MIN/1.73M2 (ref 60–?)
EST. AVERAGE GLUCOSE BLD GHB EST-MCNC: 117 MG/DL (ref 68–126)
FASTING STATUS PATIENT QL REPORTED: YES
GLUCOSE BLD-MCNC: 102 MG/DL (ref 70–99)
HBA1C MFR BLD: 5.7 % (ref ?–5.7)
OSMOLALITY SERPL CALC.SUM OF ELEC: 301 MOSM/KG (ref 275–295)
POTASSIUM SERPL-SCNC: 3.1 MMOL/L (ref 3.5–5.1)
SODIUM SERPL-SCNC: 145 MMOL/L (ref 136–145)
T4 FREE SERPL-MCNC: 0.8 NG/DL (ref 0.8–1.7)
TSI SER-ACNC: 13.3 MIU/ML (ref 0.36–3.74)

## 2023-08-22 PROCEDURE — 84439 ASSAY OF FREE THYROXINE: CPT

## 2023-08-22 PROCEDURE — 84443 ASSAY THYROID STIM HORMONE: CPT

## 2023-08-22 PROCEDURE — 36415 COLL VENOUS BLD VENIPUNCTURE: CPT

## 2023-08-22 PROCEDURE — 83036 HEMOGLOBIN GLYCOSYLATED A1C: CPT

## 2023-08-22 PROCEDURE — 80048 BASIC METABOLIC PNL TOTAL CA: CPT

## 2023-08-23 ENCOUNTER — OFFICE VISIT (OUTPATIENT)
Dept: INTERNAL MEDICINE CLINIC | Facility: CLINIC | Age: 73
End: 2023-08-23

## 2023-08-23 VITALS
SYSTOLIC BLOOD PRESSURE: 153 MMHG | WEIGHT: 264 LBS | HEART RATE: 54 BPM | BODY MASS INDEX: 34.99 KG/M2 | OXYGEN SATURATION: 98 % | DIASTOLIC BLOOD PRESSURE: 74 MMHG | TEMPERATURE: 98 F | HEIGHT: 73 IN

## 2023-08-23 DIAGNOSIS — I10 ESSENTIAL HYPERTENSION: ICD-10-CM

## 2023-08-23 DIAGNOSIS — Z00.00 ENCOUNTER FOR ANNUAL HEALTH EXAMINATION: Primary | ICD-10-CM

## 2023-08-23 DIAGNOSIS — E03.9 HYPOTHYROIDISM, UNSPECIFIED TYPE: ICD-10-CM

## 2023-08-23 DIAGNOSIS — E78.5 HYPERLIPIDEMIA, UNSPECIFIED HYPERLIPIDEMIA TYPE: ICD-10-CM

## 2023-08-23 DIAGNOSIS — E87.6 HYPOKALEMIA: ICD-10-CM

## 2023-08-23 DIAGNOSIS — I48.92 ATRIAL FLUTTER, UNSPECIFIED TYPE (HCC): ICD-10-CM

## 2023-08-23 DIAGNOSIS — Z85.528 HISTORY OF RENAL CELL CARCINOMA: ICD-10-CM

## 2023-08-23 PROCEDURE — G0439 PPPS, SUBSEQ VISIT: HCPCS | Performed by: INTERNAL MEDICINE

## 2023-08-23 PROCEDURE — 1125F AMNT PAIN NOTED PAIN PRSNT: CPT | Performed by: INTERNAL MEDICINE

## 2023-08-23 PROCEDURE — 99213 OFFICE O/P EST LOW 20 MIN: CPT | Performed by: INTERNAL MEDICINE

## 2023-08-23 RX ORDER — AMIODARONE HYDROCHLORIDE 200 MG/1
TABLET ORAL
COMMUNITY
Start: 2023-06-12

## 2023-08-23 RX ORDER — AMLODIPINE BESYLATE 10 MG/1
TABLET ORAL
COMMUNITY
Start: 2023-06-23

## 2023-08-23 RX ORDER — LEVOTHYROXINE SODIUM 0.03 MG/1
25 TABLET ORAL
Qty: 30 TABLET | Refills: 5 | Status: SHIPPED | OUTPATIENT
Start: 2023-08-23

## 2023-09-24 LAB — AMB EXT COVID-19 RESULT: DETECTED

## 2023-09-25 ENCOUNTER — NURSE TRIAGE (OUTPATIENT)
Dept: INTERNAL MEDICINE CLINIC | Facility: CLINIC | Age: 73
End: 2023-09-25

## 2023-09-25 NOTE — TELEPHONE ENCOUNTER
Action Requested: Summary for Provider     []  Critical Lab, Recommendations Needed  [x] Need Additional Advice  []   FYI    []   Need Orders  [] Need Medications Sent to Pharmacy  []  Other     SUMMARY: Patient is requesting that message be sent to Dr. Brandon Rodarte in case there were any further recommendations at this time. Verified name and . Patient states he tested positive for COVID-19 yesterday 23 (external results console updated). He states today is the 4th day from the start of symptoms. He reports headaches and cough. He denies any difficulty breathing or chest pain at this time. Patient was advised per protocol to push fluids- water and sports drinks, use Tylenol or Ibuprofen as directed, use cough medicine with DM as directed,  use humidifier, and inhale hot steam from the shower. Patient was advised of the COVID-19 isolation guidelines per the CDC:    For a patient who tests positive for COVID-19 (regardless of vaccination status):  -The patient is to stay home and isolate for 5 days after positive COVID-19 test or date of onset of symptoms (whichever date came first). -On days 6-10, if the patient has no symptoms or if symptoms are resolving, they can leave their house as long as they no longer have a fever for 24 hours without the use of fever reducing medication, however, they must continue to wear a mask around others until the 10th day after the positive COVID-19 test or date of onset of symptoms. Patient was advised to continue to monitor symptoms and to call us back with any questions in regards to symptoms or if symptoms worsen. In addition, patient was advised to go to the emergency room for any severe worsening of symptoms or if they develop any difficulty breathing, chest pain, severe pain. Patient verbalized understanding for criteria of when to call office for assistance when to go to the emergency room.     Reason for call: Acute  Onset: Data Unavailable            Reason for Disposition   [1] [facility] diagnosed by positive lab test (e.g., PCR, rapid self-test kit) AND [2] mild symptoms (e.g., cough, fever, others) AND [1] no complications or SOB    Protocols used: Coronavirus (COVID-19) Diagnosed or [id number]

## 2023-09-25 NOTE — TELEPHONE ENCOUNTER
Noted and agree with advice given. Given his current medication list, he would not be a candidate for Paxlovid. He should let us know if he feels like he is worsening at any point.

## 2023-10-04 ENCOUNTER — LAB ENCOUNTER (OUTPATIENT)
Dept: LAB | Facility: HOSPITAL | Age: 73
End: 2023-10-04
Attending: INTERNAL MEDICINE
Payer: MEDICARE

## 2023-10-04 DIAGNOSIS — I48.92 ATRIAL FLUTTER (HCC): Primary | ICD-10-CM

## 2023-10-04 LAB
ALBUMIN SERPL-MCNC: 3.9 G/DL (ref 3.4–5)
ALBUMIN/GLOB SERPL: 1 {RATIO} (ref 1–2)
ALP LIVER SERPL-CCNC: 84 U/L
ALT SERPL-CCNC: 29 U/L
ANION GAP SERPL CALC-SCNC: 6 MMOL/L (ref 0–18)
AST SERPL-CCNC: 22 U/L (ref 15–37)
BASOPHILS # BLD AUTO: 0.04 X10(3) UL (ref 0–0.2)
BASOPHILS NFR BLD AUTO: 0.5 %
BILIRUB SERPL-MCNC: 0.6 MG/DL (ref 0.1–2)
BUN BLD-MCNC: 26 MG/DL (ref 7–18)
BUN/CREAT SERPL: 15.8 (ref 10–20)
CALCIUM BLD-MCNC: 9 MG/DL (ref 8.5–10.1)
CHLORIDE SERPL-SCNC: 111 MMOL/L (ref 98–112)
CO2 SERPL-SCNC: 29 MMOL/L (ref 21–32)
CREAT BLD-MCNC: 1.65 MG/DL
DEPRECATED RDW RBC AUTO: 46.8 FL (ref 35.1–46.3)
EGFRCR SERPLBLD CKD-EPI 2021: 43 ML/MIN/1.73M2 (ref 60–?)
EOSINOPHIL # BLD AUTO: 0.19 X10(3) UL (ref 0–0.7)
EOSINOPHIL NFR BLD AUTO: 2.5 %
ERYTHROCYTE [DISTWIDTH] IN BLOOD BY AUTOMATED COUNT: 15 % (ref 11–15)
FASTING STATUS PATIENT QL REPORTED: NO
GLOBULIN PLAS-MCNC: 3.9 G/DL (ref 2.8–4.4)
GLUCOSE BLD-MCNC: 78 MG/DL (ref 70–99)
HCT VFR BLD AUTO: 42.9 %
HGB BLD-MCNC: 14.5 G/DL
IMM GRANULOCYTES # BLD AUTO: 0.05 X10(3) UL (ref 0–1)
IMM GRANULOCYTES NFR BLD: 0.7 %
LYMPHOCYTES # BLD AUTO: 1.65 X10(3) UL (ref 1–4)
LYMPHOCYTES NFR BLD AUTO: 21.7 %
MCH RBC QN AUTO: 28.8 PG (ref 26–34)
MCHC RBC AUTO-ENTMCNC: 33.8 G/DL (ref 31–37)
MCV RBC AUTO: 85.3 FL
MONOCYTES # BLD AUTO: 0.83 X10(3) UL (ref 0.1–1)
MONOCYTES NFR BLD AUTO: 10.9 %
NEUTROPHILS # BLD AUTO: 4.85 X10 (3) UL (ref 1.5–7.7)
NEUTROPHILS # BLD AUTO: 4.85 X10(3) UL (ref 1.5–7.7)
NEUTROPHILS NFR BLD AUTO: 63.7 %
OSMOLALITY SERPL CALC.SUM OF ELEC: 306 MOSM/KG (ref 275–295)
PLATELET # BLD AUTO: 278 10(3)UL (ref 150–450)
POTASSIUM SERPL-SCNC: 3.4 MMOL/L (ref 3.5–5.1)
PROT SERPL-MCNC: 7.8 G/DL (ref 6.4–8.2)
RBC # BLD AUTO: 5.03 X10(6)UL
SODIUM SERPL-SCNC: 146 MMOL/L (ref 136–145)
T4 FREE SERPL-MCNC: 0.9 NG/DL (ref 0.8–1.7)
TSI SER-ACNC: 13.3 MIU/ML (ref 0.36–3.74)
WBC # BLD AUTO: 7.6 X10(3) UL (ref 4–11)

## 2023-10-04 PROCEDURE — 36415 COLL VENOUS BLD VENIPUNCTURE: CPT

## 2023-10-04 PROCEDURE — 84443 ASSAY THYROID STIM HORMONE: CPT

## 2023-10-04 PROCEDURE — 85025 COMPLETE CBC W/AUTO DIFF WBC: CPT

## 2023-10-04 PROCEDURE — 80053 COMPREHEN METABOLIC PANEL: CPT

## 2023-10-04 PROCEDURE — 84439 ASSAY OF FREE THYROXINE: CPT

## 2023-11-20 ENCOUNTER — TELEPHONE (OUTPATIENT)
Facility: CLINIC | Age: 73
End: 2023-11-20

## 2023-11-20 ENCOUNTER — PATIENT MESSAGE (OUTPATIENT)
Dept: INTERNAL MEDICINE CLINIC | Facility: CLINIC | Age: 73
End: 2023-11-20

## 2023-11-20 DIAGNOSIS — Z12.5 PROSTATE CANCER SCREENING: ICD-10-CM

## 2023-11-20 DIAGNOSIS — I10 ESSENTIAL HYPERTENSION: Primary | ICD-10-CM

## 2023-11-20 NOTE — TELEPHONE ENCOUNTER
Patient outreach message received:    Colon recall entered for repeat in 3 yrs, 2/16/2024  Colon done in 2/16/2021    Recall reminder letter mailed out to patient.

## 2023-11-21 NOTE — TELEPHONE ENCOUNTER
CBC, CMP, TSH/T4 are already ordered by Dr. Kesha Conn. Labs pending per [facility] Annual labs. Future Appointments   Date Time Provider Afia Castañeda   11/29/2023 11:40 AM Adelita Spain MD [facility]   5/6/2024  9:20 AM John Roberto MD [facility]     Can patient have ear wax removal done at this appointment?

## 2023-11-25 ENCOUNTER — LAB ENCOUNTER (OUTPATIENT)
Dept: LAB | Facility: HOSPITAL | Age: 73
End: 2023-11-25
Attending: INTERNAL MEDICINE
Payer: MEDICARE

## 2023-11-25 LAB
CHOLEST SERPL-MCNC: 126 MG/DL (ref ?–200)
COMPLEXED PSA SERPL-MCNC: 0.66 NG/ML (ref ?–4)
FASTING PATIENT LIPID ANSWER: YES
HDLC SERPL-MCNC: 40 MG/DL (ref 40–59)
LDLC SERPL CALC-MCNC: 72 MG/DL (ref ?–100)
NONHDLC SERPL-MCNC: 86 MG/DL (ref ?–130)
TRIGL SERPL-MCNC: 64 MG/DL (ref 30–149)
VLDLC SERPL CALC-MCNC: 10 MG/DL (ref 0–30)

## 2023-11-25 PROCEDURE — 36415 COLL VENOUS BLD VENIPUNCTURE: CPT | Performed by: INTERNAL MEDICINE

## 2023-11-25 PROCEDURE — 80061 LIPID PANEL: CPT | Performed by: INTERNAL MEDICINE

## 2023-11-27 NOTE — TELEPHONE ENCOUNTER
10/4/23 - T4 and TSH with Reflex to T4 - ordered by Dr. Shin Ramesh and completed. On 10/4/23         Per 11/25 Test Result        Lipid Panel: Patient Communication     Append Comments   Seen    The PSA  is normal.  There is no evidence of prostate cancer. The total cholesterol, HDL cholesterol, LDL cholesterol, and triglycerides are all normal. This is good news. Please contact the office if you wish to discuss this issue directly with me, either over the phone or face-to-face in the office.      Dr Cinthya Mclean MD   Written by Brennan Woods MD on 11/25/2023 11:49 AM CST  Seen by patient Erna Rodríguez on 11/27/2023  7:27 AM

## 2023-11-27 NOTE — TELEPHONE ENCOUNTER
Dr. Contreras Eye - patient seeking clarification - you can use the 10/4/23 thyroid results ordered by Dr. Ulises Garay, during patient's 11/29/23 Medication follow-up appointment, correct? Please advise if any other tests need to be completed.      Future Appointments   Date Time Provider Afia Castañeda   11/29/2023 11:40 AM Hilario Kuo MD [facility]   5/6/2024  9:20 AM Beatrice Evangelista MD [facility]

## 2023-11-28 NOTE — TELEPHONE ENCOUNTER
We do not need to recheck the thyroid test at this time. We can use the ones that were drawn in October.

## 2023-11-29 ENCOUNTER — OFFICE VISIT (OUTPATIENT)
Dept: INTERNAL MEDICINE CLINIC | Facility: CLINIC | Age: 73
End: 2023-11-29
Payer: MEDICARE

## 2023-11-29 VITALS
BODY MASS INDEX: 34.85 KG/M2 | TEMPERATURE: 98 F | HEIGHT: 73 IN | RESPIRATION RATE: 20 BRPM | HEART RATE: 52 BPM | DIASTOLIC BLOOD PRESSURE: 68 MMHG | WEIGHT: 263 LBS | SYSTOLIC BLOOD PRESSURE: 128 MMHG

## 2023-11-29 DIAGNOSIS — I48.92 ATRIAL FLUTTER, UNSPECIFIED TYPE (HCC): ICD-10-CM

## 2023-11-29 DIAGNOSIS — I10 ESSENTIAL HYPERTENSION: ICD-10-CM

## 2023-11-29 DIAGNOSIS — E03.9 HYPOTHYROIDISM, UNSPECIFIED TYPE: Primary | ICD-10-CM

## 2023-11-29 PROBLEM — Z76.89 ENCOUNTER FOR INCISION AND DRAINAGE PROCEDURE: Status: RESOLVED | Noted: 2021-11-16 | Resolved: 2023-11-29

## 2023-11-29 PROCEDURE — 99214 OFFICE O/P EST MOD 30 MIN: CPT | Performed by: INTERNAL MEDICINE

## 2023-11-29 PROCEDURE — 1126F AMNT PAIN NOTED NONE PRSNT: CPT | Performed by: INTERNAL MEDICINE

## 2023-11-29 RX ORDER — AMLODIPINE BESYLATE 10 MG/1
10 TABLET ORAL DAILY
Qty: 90 TABLET | Refills: 1 | Status: SHIPPED | OUTPATIENT
Start: 2023-11-29

## 2023-11-29 RX ORDER — LEVOTHYROXINE SODIUM 0.05 MG/1
50 TABLET ORAL
Qty: 90 TABLET | Refills: 3 | Status: SHIPPED | OUTPATIENT
Start: 2023-11-29

## 2023-12-19 NOTE — PROGRESS NOTES
Please call this patient and notify them of the need for an office visit to the review fungus culture results thank you 4

## 2024-01-29 ENCOUNTER — LAB ENCOUNTER (OUTPATIENT)
Dept: LAB | Facility: HOSPITAL | Age: 74
End: 2024-01-29
Attending: INTERNAL MEDICINE
Payer: MEDICARE

## 2024-01-29 DIAGNOSIS — E03.9 HYPOTHYROIDISM, UNSPECIFIED TYPE: ICD-10-CM

## 2024-01-29 LAB — TSI SER-ACNC: 10.11 MIU/ML (ref 0.55–4.78)

## 2024-01-29 PROCEDURE — 36415 COLL VENOUS BLD VENIPUNCTURE: CPT

## 2024-01-29 PROCEDURE — 84443 ASSAY THYROID STIM HORMONE: CPT

## 2024-02-16 RX ORDER — LEVOTHYROXINE SODIUM 0.03 MG/1
25 TABLET ORAL
Qty: 30 TABLET | Refills: 5 | OUTPATIENT
Start: 2024-02-16

## 2024-02-26 ENCOUNTER — TELEPHONE (OUTPATIENT)
Facility: CLINIC | Age: 74
End: 2024-02-26

## 2024-02-26 DIAGNOSIS — Z86.010 H/O ADENOMATOUS POLYP OF COLON: Primary | ICD-10-CM

## 2024-02-26 NOTE — TELEPHONE ENCOUNTER
Last Procedure, Date, MD:  02/16/2021, colonoscopy, Dr Cruz  Last Diagnosis:  sessile serrated adenia  Recalled (mth/yrs): 3 years  Sedation Used Previously:  MAC  Last Prep Used (if known):  Suprep  Quality Of Prep (if known): excellent  Anticoagulants: Xarelto discontinued several months ago  Diabetic Med's (PO/Injectables): no  Weight loss Med's:no  Iron/Herbal/Multivitamin Supplements (RX/OTC): vitamins  Marijuana/Vaping/CBD: no  Height & Weight & BMI: 6'1\"/263/34.7  Hx of Cardiac/CVA Issues/(MI/Stroke): no  Devices Pacemaker/Defibrillator/Stents: no  Respiratory Issues/Oxygen Use/ALSISA/COPD:no  Issues w/ Anesthesia: no    Symptoms (Y/N): no  Symptoms Details: N/A    Special Comments/Notes: LOV w/Dr Martin was 11/29/2023. Medications allergies and pharmacy confirmed w/pt    Please advise on orders and prep.     Thank you!         Shiv Cruz MD   Physician  Gastroenterology     Operative Report     Signed     Date of Service: 2/16/2021 11:42 AM  Case Time: Procedures: Surgeons:   2/16/2021 12:44 PM COLONOSCOPY    Shiv Cruz MD               Signed         COLONOSCOPY PROCEDURE REPORT     DATE OF PROCEDURE:  2/16/2021      PCP: Wilbur Martin MD     PREOPERATIVE DIAGNOSIS:  Personal history adenomatous colon polyps     POSTOPERATIVE DIAGNOSIS:  See impression.     SURGEON:  Shiv Cruz M.D.     SEDATION:    MAC anesthesia provided by the Anesthesia Service.  MAC anesthesia requested due to anticipated intolerance of colonoscopy examination under safe doses conscious sedation medications     COLONOSCOPY PROCEDURE:   After the nature and risks of colonoscopy examination under MAC anesthesia were discussed with the patient and all questions answered, informed consent was obtained.  The patient was sedated as above.       Digital rectal exam was performed which showed no masses.  The Olympus pediatric video colonoscope was placed in the patient's rectum and advanced under  direct visualization through the entire length of the colon up to the cecum and terminal ileum.  Retroflex exam performed up the ascending colon to the hepatic flexure.  The cecum was confirmed by landmarks including appendiceal orifice, cecal trifold, ileocecal valve.  Retroflexion was performed in the rectum.     The quality of the prep was excellent.     COLONOSCOPY FINDINGS:    Subtle sessile 12+ mm polyp with a thick mucoid cap identified in the mid transverse colon.  Unusually long, redundant transverse colon.  Polyp was raised up by submucosal saline injection of the \"Orise\" agent, then removed by piecemeal snare cautery polypectomy using small hexagonal snare for complete resection.  Tattoo ink injected into the submucosal injection to home the spot.  Small internal hemorrhoids.     RECOMMENDATIONS:  High fiber diet.  Follow-up above colon polyp pathology results.  Repeat colonoscopy examination in 3 years.  No aspirin or NSAID medications for next 10 days to prevent bleeding            ==================================  Final Diagnosis:      Transverse colon polyp; biopsy:  Fragments of sessile serrated adenoma

## 2024-02-26 NOTE — TELEPHONE ENCOUNTER
From: Jose Mayfield  To: Oli Shaw MD  Sent: 10/22/2021 11:55 AM CDT  Subject: Flu shot record    Please note flu shot received 10-. See attached for records.   Thanks  Frank Bal 2

## 2024-02-27 RX ORDER — SODIUM, POTASSIUM,MAG SULFATES 17.5-3.13G
SOLUTION, RECONSTITUTED, ORAL ORAL
Qty: 1 EACH | Refills: 0 | Status: SHIPPED | OUTPATIENT
Start: 2024-02-27

## 2024-02-28 NOTE — TELEPHONE ENCOUNTER
Scheduled for:  Colonoscopy [id number]  Provider Name:     Date:  Tues, 09/10/2024  Location:  [facility]   Sedation:  MAC  Time:  7:45am (pt is aware to arrive at 6:45am     Prep:  Golytely  Meds/Allergies Reconciled?:  Yes    Diagnosis with codes:   history high risk sessile serrated adenoma colon polyp Z86.010  Was patient informed to call insurance with codes (Y/N):  Yes     Referral sent?:  Referral was sent at the time of electronic surgical scheduling.    [facility] or [facility] notified?:  I sent an electronic request to Endo Scheduling and received a confirmation today.       Medication Orders:  Hold Cialis/Tadalafil or Viagra/sildenafil or Levitra/Vardenafil, Stendra/Stendra medication > 3 days prior to procedure     Apparently off previous Xarelto anticoagulation.     Misc Orders:  None     Further instructions given by staff:  I discussed the prep instructions with the patient which he verbally understood and is aware that I will send the instructions today.via [employer]

## 2024-02-28 NOTE — TELEPHONE ENCOUNTER
Thanks all.    Recent office visit PCP Wilbur Martin 11/29/2023 reviewed  Recent abnormal thyroid function.  Now on levothyroxine 75 mcg.  Apparently off previous Xarelto anticoagulation.    My previous colonoscopy examination 2/16/2021 reviewed.  12+mm sessile polyp removed from mid transverse colon, sessile serrated adenoma.    GI schedulers -    Please schedule colonoscopy exam at [facility] ([facility])    BMI Readings from Last 1 Encounters:   11/29/23 34.70 kg/m²     MAC anesthesia     BP Readings from Last 5 Encounters:   11/29/23 128/68   08/23/23 153/74   05/31/23 128/86   05/26/23 138/84   05/19/23 (!) 165/102       Suprep small volume bowel prep if covered by insurance, otherwise   Golytely (PEG) 4L bowel prep  Rx sent in [facility]      DX = history high risk sessile serrated adenoma colon polyp    Medication instructions:    Hold Cialis/Tadalafil or Viagra/sildenafil or Levitra/Vardenafil, Stendra/Stendra medication > 3 days prior to procedure    Apparently off previous Xarelto anticoagulation.

## 2024-04-12 ENCOUNTER — LAB ENCOUNTER (OUTPATIENT)
Dept: LAB | Facility: HOSPITAL | Age: 74
End: 2024-04-12
Attending: INTERNAL MEDICINE
Payer: MEDICARE

## 2024-04-12 ENCOUNTER — HOSPITAL ENCOUNTER (OUTPATIENT)
Dept: GENERAL RADIOLOGY | Facility: HOSPITAL | Age: 74
Discharge: HOME | End: 2024-04-12
Attending: INTERNAL MEDICINE
Payer: MEDICARE

## 2024-04-12 DIAGNOSIS — I48.92 ATRIAL FLUTTER (HCC): Primary | ICD-10-CM

## 2024-04-12 DIAGNOSIS — I48.92 ATRIAL FLUTTER (HCC): ICD-10-CM

## 2024-04-12 DIAGNOSIS — I10 ESSENTIAL HYPERTENSION, MALIGNANT: ICD-10-CM

## 2024-04-12 DIAGNOSIS — Z79.899 NEED FOR PROPHYLACTIC CHEMOTHERAPY: ICD-10-CM

## 2024-04-12 LAB
ALBUMIN SERPL-MCNC: 4.4 G/DL (ref 3.2–4.8)
ALBUMIN/GLOB SERPL: 1.5 {RATIO} (ref 1–2)
ALP LIVER SERPL-CCNC: 91 U/L
ALT SERPL-CCNC: 23 U/L
ANION GAP SERPL CALC-SCNC: 4 MMOL/L (ref 0–18)
AST SERPL-CCNC: 26 U/L (ref ?–34)
BILIRUB SERPL-MCNC: 0.8 MG/DL (ref 0.2–1.1)
BUN BLD-MCNC: 21 MG/DL (ref 9–23)
BUN/CREAT SERPL: 16 (ref 10–20)
CALCIUM BLD-MCNC: 9.6 MG/DL (ref 8.7–10.4)
CHLORIDE SERPL-SCNC: 110 MMOL/L (ref 98–112)
CO2 SERPL-SCNC: 29 MMOL/L (ref 21–32)
CREAT BLD-MCNC: 1.31 MG/DL
EGFRCR SERPLBLD CKD-EPI 2021: 57 ML/MIN/1.73M2 (ref 60–?)
FASTING STATUS PATIENT QL REPORTED: NO
GLOBULIN PLAS-MCNC: 2.9 G/DL (ref 2.8–4.4)
GLUCOSE BLD-MCNC: 138 MG/DL (ref 70–99)
OSMOLALITY SERPL CALC.SUM OF ELEC: 301 MOSM/KG (ref 275–295)
POTASSIUM SERPL-SCNC: 3.6 MMOL/L (ref 3.5–5.1)
PROT SERPL-MCNC: 7.3 G/DL (ref 5.7–8.2)
SODIUM SERPL-SCNC: 143 MMOL/L (ref 136–145)
T4 FREE SERPL-MCNC: 1.3 NG/DL (ref 0.8–1.7)
TSI SER-ACNC: 6.79 MIU/ML (ref 0.55–4.78)

## 2024-04-12 PROCEDURE — 80503 PATH CLIN CONSLTJ SF 5-20: CPT

## 2024-04-12 PROCEDURE — 84443 ASSAY THYROID STIM HORMONE: CPT

## 2024-04-12 PROCEDURE — 84439 ASSAY OF FREE THYROXINE: CPT

## 2024-04-12 PROCEDURE — 36415 COLL VENOUS BLD VENIPUNCTURE: CPT

## 2024-04-12 PROCEDURE — 80053 COMPREHEN METABOLIC PANEL: CPT

## 2024-04-12 PROCEDURE — 71046 X-RAY EXAM CHEST 2 VIEWS: CPT | Performed by: INTERNAL MEDICINE

## 2024-04-26 ENCOUNTER — OFFICE VISIT (OUTPATIENT)
Dept: INTERNAL MEDICINE CLINIC | Facility: CLINIC | Age: 74
End: 2024-04-26
Payer: MEDICARE

## 2024-04-26 VITALS
OXYGEN SATURATION: 97 % | HEIGHT: 73 IN | HEART RATE: 56 BPM | SYSTOLIC BLOOD PRESSURE: 122 MMHG | WEIGHT: 266 LBS | BODY MASS INDEX: 35.25 KG/M2 | DIASTOLIC BLOOD PRESSURE: 72 MMHG

## 2024-04-26 DIAGNOSIS — E03.9 HYPOTHYROIDISM, UNSPECIFIED TYPE: Primary | ICD-10-CM

## 2024-04-26 PROCEDURE — 99214 OFFICE O/P EST MOD 30 MIN: CPT | Performed by: INTERNAL MEDICINE

## 2024-04-26 RX ORDER — LEVOTHYROXINE SODIUM 0.1 MG/1
100 TABLET ORAL
Qty: 90 TABLET | Refills: 1 | Status: SHIPPED | OUTPATIENT
Start: 2024-04-26

## 2024-04-26 RX ORDER — METOPROLOL SUCCINATE 25 MG/1
25 TABLET, EXTENDED RELEASE ORAL DAILY
COMMUNITY
Start: 2024-04-13

## 2024-04-26 NOTE — PROGRESS NOTES
HPI:    Patient ID: Stanley De Santiago is a 73 year old male.    HPI  Patient here for follow-up on hypothyroidism.  Last seen November 29.  Patient was started on thyroid medication last August.  In November we increased up to 50 mcg.  In February we increased up to 75 mcg.  This is the levothyroxine.  He notes no major changes in energy level.  No heart fluttering or diarrhea.  He is also following with the heart doctor for the atrial flutter.  Current medications reviewed.    TSH done on April 12 was elevated at 6.756.      Patient Active Problem List   Diagnosis    Benign prostatic hyperplasia without lower urinary tract symptoms    Essential hypertension    Hyperlipidemia    Osteoarthrosis    Osteoarthrosis, unspecified whether generalized or localized, lower leg    Primary osteoarthritis of right knee    Left knee pain    Abscess    Boil    Diverticulosis    New onset atrial flutter (HCC)    Atrial flutter with rapid ventricular response (HCC)    Arthropathy, lower leg    Benign neoplasm of skin    Calculus of kidney    Cholelithiasis    Contusion    Hematuria    Impacted cerumen    Renal cell carcinoma (HCC)    Microscopic hematuria    Hypokalemia    Azotemia    Hyperglycemia    Atrial fibrillation and flutter (HCC)    Nodular prostate with urinary obstruction    Pain in joint, lower leg    Pain in joint, pelvic region and thigh    Primary localized osteoarthrosis, lower leg    Sebaceous cyst          HISTORY:  Past Medical History:    Cholecystitis    Epidermal inclusion cyst    excision of cyst on back, 2013    High blood pressure    High cholesterol    Osteoarthritis    Other and unspecified hyperlipidemia    Prostate enlargement    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    Unspecified essential hypertension      Past Surgical History:   Procedure Laterality Date    Cholecystectomy      Colonoscopy  2007    Colonoscopy N/A 1/2/2018    Procedure: COLONOSCOPY;  Surgeon: Shiv Cruz MD;   Location: [facility] ENDOSCOPY    Colonoscopy N/A 2/16/2021    Procedure: COLONOSCOPY;  Surgeon: Shiv Cruz MD;  Location: [facility] ENDOSCOPY    Kidney surgery  2012    left partial nephrectomy    Knee replacement surgery Left 2005    Right TKR 10/2015    Total knee replacement        Family History   Problem Relation Age of Onset    Cancer Mother     Breast Cancer Mother     Alcohol and Other Disorders Associated Father         alcoholism      Social History     Socioeconomic History    Marital status:    Tobacco Use    Smoking status: Never    Smokeless tobacco: Never   Vaping Use    Vaping status: Never Used   Substance and Sexual Activity    Alcohol use: Yes     Alcohol/week: 4.0 standard drinks of alcohol     Types: 4 Glasses of wine per week    Drug use: No    Sexual activity: Not Currently     Partners: Female   Other Topics Concern    Caffeine Concern Yes     Comment: coffee 2cups/day     Social Determinants of Health     Financial Resource Strain: Low Risk  (5/30/2023)    Financial Resource Strain     Difficulty of Paying Living Expenses: Not very hard     Med Affordability: No   Transportation Needs: No Transportation Needs (5/30/2023)    Transportation Needs     Lack of Transportation: No          Review of Systems          Current Outpatient Medications   Medication Sig Dispense Refill    metoprolol succinate ER 25 MG Oral Tablet 24 Hr Take 1 tablet (25 mg total) by mouth daily.      levothyroxine 100 MCG Oral Tab Take 1 tablet (100 mcg total) by mouth before breakfast. 90 tablet 1    Na Sulfate-K Sulfate-Mg Sulf (SUPREP BOWEL PREP KIT) 17.5-3.13-1.6 GM/177ML Oral Solution Take as directed 1 each 0    amLODIPine 10 MG Oral Tab Take 1 tablet (10 mg total) by mouth daily. 90 tablet 1    amiodarone 200 MG Oral Tab amiodarone 200 mg tablet, [RxNorm: 136756]      atorvastatin 20 MG Oral Tab Take 1 tablet (20 mg total) by mouth nightly. Watch muscle weakness 30 tablet 0    oxybutynin ER 10 MG Oral  Tablet 24 Hr Take 1 tablet (10 mg total) by mouth daily. 90 tablet 3    Multiple Vitamin (MULTIVITAMIN ADULT OR) Take by mouth.      rivaroxaban 15 MG Oral Tab Take 1 tablet (15 mg total) by mouth daily with food. 30 tablet 0    metoprolol tartrate 50 MG Oral Tab Take 1 tablet (50 mg total) by mouth 2x Daily(Beta Blocker). 60 tablet 2     Allergies:No Known Allergies     PHYSICAL EXAM:   /72   Pulse 56   Ht 6' 1\" (1.854 m)   Wt 266 lb (120.7 kg)   SpO2 97%   BMI 35.09 kg/m²      Physical Exam  Constitutional:       Appearance: Normal appearance.   Cardiovascular:      Rate and Rhythm: Normal rate and regular rhythm.      Pulses: Normal pulses.      Heart sounds: Normal heart sounds.   Pulmonary:      Effort: Pulmonary effort is normal.      Breath sounds: Normal breath sounds.   Abdominal:      General: Abdomen is flat.      Palpations: Abdomen is soft.      Tenderness: There is no abdominal tenderness.   Lymphadenopathy:      Cervical: No cervical adenopathy.   Neurological:      General: No focal deficit present.      Mental Status: He is alert.   Psychiatric:         Mood and Affect: Mood normal.         Behavior: Behavior normal.         Thought Content: Thought content normal.          Wt Readings from Last 6 Encounters:   04/26/24 266 lb (120.7 kg)   11/29/23 263 lb (119.3 kg)   08/23/23 264 lb (119.7 kg)   05/31/23 262 lb (118.8 kg)   05/26/23 261 lb 11.2 oz (118.7 kg)   05/19/23 264 lb (119.7 kg)             ASSESSMENT/PLAN:   1. Hypothyroidism, unspecified type  TSH slowly improving but still elevated.  No symptoms or side effects of the medication.  No issues with the heart at this point.  We will increase the TSH from 75 up to 100 mcg a day.  Repeat TSH ordered for 2 to 3 months.  Follow-up at that point.  Contact the office if he has any palpitations, increased heart rate, or other symptoms.  - Assay, Thyroid Stim Hormone; Future         Meds This Visit:  Requested Prescriptions     Signed  Prescriptions Disp Refills    levothyroxine 100 MCG Oral Tab 90 tablet 1     Sig: Take 1 tablet (100 mcg total) by mouth before breakfast.       Imaging & Referrals:  None         Wilbur Martin MD

## 2024-05-06 ENCOUNTER — OFFICE VISIT (OUTPATIENT)
Dept: SURGERY | Facility: CLINIC | Age: 74
End: 2024-05-06
Payer: MEDICARE

## 2024-05-06 DIAGNOSIS — N13.8 BPH WITH OBSTRUCTION/LOWER URINARY TRACT SYMPTOMS: Primary | ICD-10-CM

## 2024-05-06 DIAGNOSIS — N40.1 BPH WITH OBSTRUCTION/LOWER URINARY TRACT SYMPTOMS: Primary | ICD-10-CM

## 2024-05-06 LAB
BILIRUB UR QL: NEGATIVE
CLARITY UR: CLEAR
COLOR UR: YELLOW
GLUCOSE UR-MCNC: NORMAL MG/DL
HGB UR QL STRIP.AUTO: NEGATIVE
HYALINE CASTS #/AREA URNS AUTO: PRESENT /LPF
KETONES UR-MCNC: NEGATIVE MG/DL
LEUKOCYTE ESTERASE UR QL STRIP.AUTO: 250
NITRITE UR QL STRIP.AUTO: NEGATIVE
PH UR: 6.5 [PH] (ref 5–8)
PROT UR-MCNC: 70 MG/DL
SP GR UR STRIP: 1.01 (ref 1–1.03)
UROBILINOGEN UR STRIP-ACNC: NORMAL

## 2024-05-06 PROCEDURE — 51798 US URINE CAPACITY MEASURE: CPT | Performed by: UROLOGY

## 2024-05-06 PROCEDURE — 99213 OFFICE O/P EST LOW 20 MIN: CPT | Performed by: UROLOGY

## 2024-05-06 NOTE — PROGRESS NOTES
Stanley De Santiago is a 73 year old male.    HPI:     Chief Complaint   Patient presents with    BPH     Yearly visit pt remains on oxybutynin, no refill needed at this time. Pt states urination has no changed since 1 year ago      73-year-old male presents in follow-up to visit May 5, 2023.  Has a history of BPH status post greenlight laser vaporization February 2020, residual overactive bladder symptoms currently well-controlled on oxybutynin extended release 10 mg daily.  He was last seen in the office in 2023.  Feels well otherwise.  IPSS score remains stable at 12 quality-of-life index is 3.  PSA May 2022 most recently 0.27.  Most recent urinalysis June 2022 unremarkable.      HISTORY:  Past Medical History:    Cholecystitis    Epidermal inclusion cyst    excision of cyst on back, 2013    High blood pressure    High cholesterol    Osteoarthritis    Other and unspecified hyperlipidemia    Prostate enlargement    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    Unspecified essential hypertension      Past Surgical History:   Procedure Laterality Date    Cholecystectomy      Colonoscopy  2007    Colonoscopy N/A 1/2/2018    Procedure: COLONOSCOPY;  Surgeon: Shiv Cruz MD;  Location: [facility] ENDOSCOPY    Colonoscopy N/A 2/16/2021    Procedure: COLONOSCOPY;  Surgeon: Shiv Cruz MD;  Location: [facility] ENDOSCOPY    Kidney surgery  2012    left partial nephrectomy    Knee replacement surgery Left 2005    Right TKR 10/2015    Total knee replacement        Family History   Problem Relation Age of Onset    Cancer Mother     Breast Cancer Mother     Alcohol and Other Disorders Associated Father         alcoholism      Social History:   Social History     Socioeconomic History    Marital status:    Tobacco Use    Smoking status: Never    Smokeless tobacco: Never   Vaping Use    Vaping status: Never Used   Substance and Sexual Activity    Alcohol use: Yes     Alcohol/week: 4.0 standard drinks of  alcohol     Types: 4 Glasses of wine per week    Drug use: No    Sexual activity: Not Currently     Partners: Female   Other Topics Concern    Caffeine Concern Yes     Comment: coffee 2cups/day     Social Determinants of Health     Financial Resource Strain: Low Risk  (5/30/2023)    Financial Resource Strain     Difficulty of Paying Living Expenses: Not very hard     Med Affordability: No   Transportation Needs: No Transportation Needs (5/30/2023)    Transportation Needs     Lack of Transportation: No        Medications (Active prior to today's visit):  Current Outpatient Medications   Medication Sig Dispense Refill    metoprolol succinate ER 25 MG Oral Tablet 24 Hr Take 1 tablet (25 mg total) by mouth daily.      levothyroxine 100 MCG Oral Tab Take 1 tablet (100 mcg total) by mouth before breakfast. 90 tablet 1    Na Sulfate-K Sulfate-Mg Sulf (SUPREP BOWEL PREP KIT) 17.5-3.13-1.6 GM/177ML Oral Solution Take as directed 1 each 0    amLODIPine 10 MG Oral Tab Take 1 tablet (10 mg total) by mouth daily. 90 tablet 1    amiodarone 200 MG Oral Tab amiodarone 200 mg tablet, [RxNorm: 523999]      atorvastatin 20 MG Oral Tab Take 1 tablet (20 mg total) by mouth nightly. Watch muscle weakness 30 tablet 0    oxybutynin ER 10 MG Oral Tablet 24 Hr Take 1 tablet (10 mg total) by mouth daily. 90 tablet 3    Multiple Vitamin (MULTIVITAMIN ADULT OR) Take by mouth.      rivaroxaban 15 MG Oral Tab Take 1 tablet (15 mg total) by mouth daily with food. 30 tablet 0    metoprolol tartrate 50 MG Oral Tab Take 1 tablet (50 mg total) by mouth 2x Daily(Beta Blocker). 60 tablet 2       Allergies:  No Known Allergies      ROS:       PHYSICAL EXAM:   Bladder scan for postvoid residual volume 0 mL     ASSESSMENT/PLAN:   Assessment   Encounter Diagnosis   Name Primary?    BPH with obstruction/lower urinary tract symptoms Yes       Recommend:  - Follow-up on urinalysis from today.  - Continue on oxybutynin 10 mg daily.  - Follow-up otherwise in 1  year.         Orders This Visit:  No orders of the defined types were placed in this encounter.      Meds This Visit:  Requested Prescriptions      No prescriptions requested or ordered in this encounter       Imaging & Referrals:  None     5/6/2024  Hossein Pace MD

## 2024-05-24 RX ORDER — AMLODIPINE BESYLATE 10 MG/1
10 TABLET ORAL DAILY
Qty: 90 TABLET | Refills: 3 | Status: SHIPPED | OUTPATIENT
Start: 2024-05-24

## 2024-05-24 NOTE — TELEPHONE ENCOUNTER
Refill Per Protocol     Requested Prescriptions   Pending Prescriptions Disp Refills    AMLODIPINE 10 MG Oral Tab [Pharmacy Med Name: AMLODIPINE BESYLATE 10MG TABLETS] 90 tablet 1     Sig: TAKE 1 TABLET(10 MG) BY MOUTH DAILY       Hypertension Medications Protocol Passed - 5/21/2024 12:33 PM        Passed - CMP or BMP in past 12 months        Passed - Last BP reading less than 140/90     BP Readings from Last 1 Encounters:   04/26/24 122/72               Passed - In person appointment or virtual visit in the past 12 mos or appointment in next 3 mos     Recent Outpatient Visits              2 weeks ago BPH with obstruction/lower urinary tract symptoms    [facility] Hossein Pace MD    Office Visit    4 weeks ago Hypothyroidism, unspecified type    [facility]    Office Visit    5 months ago [facility], unspecified type    [facility]    [facility]    9 months ago [facility]counter for annual health examination    [facility]    [facility] Visit    11 months ago Atrial flutter with rapid ventricular response (HCC)    [facility]    [facility]          Future Appointments         Provider Department Appt Notes    In 3 months [facility]    In 11 months [facility]                    Passed - EGFRCR or GFRNAA > 50     GFR Evaluation  EGFRCR: 57 , resulted on 4/12/2024                 Future Appointments         Provider Department Appt Notes    In 3 months [facility], PROCEDURE [facility] @[facility]    In 11 months [facility]  [facility]          Recent Outpatient Visits              2 weeks ago BPH with obstruction/lower urinary tract symptoms    [facility]    Office Visit    4 weeks ago [facility]roidism, unspecified type    [facility]    Office Visit    5 months ago Hypothyroidism, unspecified type    [facility]    Office Visit    9 months ago [facility] for annual health examination    [facility]    Office Visit    11 months ago Atrial flutter with rapid ventricular response (HCC)    [facility]    [facility] Visit

## 2024-05-29 ENCOUNTER — PATIENT MESSAGE (OUTPATIENT)
Dept: INTERNAL MEDICINE CLINIC | Facility: CLINIC | Age: 74
End: 2024-05-29

## 2024-05-29 RX ORDER — ATORVASTATIN CALCIUM 20 MG/1
20 TABLET, FILM COATED ORAL DAILY
Qty: 90 TABLET | Refills: 0 | OUTPATIENT
Start: 2024-05-29

## 2024-05-30 ENCOUNTER — TELEPHONE (OUTPATIENT)
Dept: SURGERY | Facility: CLINIC | Age: 74
End: 2024-05-30

## 2024-05-31 RX ORDER — OXYBUTYNIN CHLORIDE 10 MG/1
10 TABLET, EXTENDED RELEASE ORAL DAILY
Qty: 90 TABLET | Refills: 3 | Status: SHIPPED | OUTPATIENT
Start: 2024-05-31

## 2024-05-31 RX ORDER — ATORVASTATIN CALCIUM 20 MG/1
20 TABLET, FILM COATED ORAL NIGHTLY
Qty: 90 TABLET | Refills: 3 | Status: SHIPPED | OUTPATIENT
Start: 2024-05-31

## 2024-05-31 NOTE — TELEPHONE ENCOUNTER
REFILL PASSED PER [facility] PROTOCOLS    Requested Prescriptions   Pending Prescriptions Disp Refills    atorvastatin 20 MG Oral Tab 90 tablet 0     Sig: Take 1 tablet (20 mg total) by mouth nightly. Watch muscle weakness       Cholesterol Medication Protocol Passed - 5/29/2024  6:26 PM        Passed - ALT < 80     Lab Results   Component Value Date    ALT 23 04/12/2024             Passed - ALT resulted within past year        Passed - Lipid panel within past 12 months     Lab Results   Component Value Date    CHOLEST 126 11/25/2023    TRIG 64 11/25/2023    HDL 40 11/25/2023    LDL 72 11/25/2023    VLDL 10 11/25/2023    NONHDLC 86 11/25/2023             Passed - In person appointment or virtual visit in the past 12 mos or appointment in next 3 mos     Recent Outpatient Visits              3 weeks ago BPH with obstruction/lower urinary tract symptoms    [facility] Hossein Pace MD    Office Visit    1 month ago [facility]roidism, unspecified type    [facility]    [facility]    6 months ago [facility]othyroidism, unspecified type    [facility]    [facility] Visit    9 months ago Encounter for annual health examination    [facility]    Office Visit    1 year ago Atrial flutter with rapid ventricular response (HCC)    [facility]    [facility]          Future Appointments         Provider Department Appt Notes    In 3 months JUSTINO, PROCEDURE [facility]mac [facility]    In 11 months [facility]                         Future Appointments         Provider Department Appt Notes    In 3 months JUSTINO, PROCEDURE  [facility] Colon w/mac @[facility]    In 11 months Hossein Pace MD [facility] 1 year          Recent Outpatient Visits              3 weeks ago BPH with obstruction/lower urinary tract symptoms    [facility] Hossein Pace MD    Office Visit    1 month ago [facility]othyroidism, unspecified type    [facility]    Office Visit    6 months ago Hypothyroidism, unspecified type    [facility]    Office Visit    9 months ago Encounter for annual health examination    [facility] Wilbur Ambrosio MD    Office Visit    1 year ago Atrial flutter with rapid ventricular response (HCC)    [facility] Wilbur Ambrosio MD    Office Visit

## 2024-05-31 NOTE — TELEPHONE ENCOUNTER
Refill for oxybutynin provided per protocol.   LOV with MD. Pace from 5/6/2024:  Recommend:  - Follow-up on urinalysis from today.  - Continue on oxybutynin 10 mg daily.  - Follow-up otherwise in 1 year.       Overactive Bladder Medications    Protocol criteria:  Appointment scheduled in the past 12 months or in the next 2 months      Recent Outpatient Visits              3 weeks ago BPH with obstruction/lower urinary tract symptoms    [facility] Hossein Pace MD    Office Visit    1 month ago [facility]roidism, unspecified type    [facility]    [facility] Visit    6 months ago [facility]othyroidism, unspecified type    [facility]    [facility] Visit    9 months ago Encounter for annual health examination    [facility]    Office Visit    1 year ago Atrial flutter with rapid ventricular response (HCC)    [facility]    [facility] Visit          Future Appointments         Provider Department Appt Notes    In 3 months JUSTINO, PROCEDURE [facility] Colon w/mac @[facility]    In 11 months Hossein Pace MD [facility] 1 year

## 2024-06-21 ENCOUNTER — LAB ENCOUNTER (OUTPATIENT)
Dept: LAB | Facility: HOSPITAL | Age: 74
End: 2024-06-21
Attending: INTERNAL MEDICINE

## 2024-06-21 DIAGNOSIS — E03.9 HYPOTHYROIDISM, UNSPECIFIED TYPE: ICD-10-CM

## 2024-06-21 LAB — TSI SER-ACNC: 2.29 MIU/ML (ref 0.55–4.78)

## 2024-06-21 PROCEDURE — 36415 COLL VENOUS BLD VENIPUNCTURE: CPT

## 2024-06-21 PROCEDURE — 84443 ASSAY THYROID STIM HORMONE: CPT

## 2024-08-06 ENCOUNTER — PATIENT MESSAGE (OUTPATIENT)
Dept: INTERNAL MEDICINE CLINIC | Facility: CLINIC | Age: 74
End: 2024-08-06

## 2024-08-06 NOTE — TELEPHONE ENCOUNTER
From: Stanley De Santiago  To: Wilbur Martin  Sent: 8/6/2024 9:53 AM CDT  Subject: Diverticulitis flare up?    I am experiencing lower intestine discomfort again. It is exactly the same as when I was diagnosed with diverticulitis last year(?) It is not to the level that sent me to the ER but I recognize the pattern.  I forget the testing for this. What do you suggest.

## 2024-08-25 ENCOUNTER — HOSPITAL ENCOUNTER (EMERGENCY)
Facility: HOSPITAL | Age: 74
Discharge: HOME | End: 2024-08-25
Attending: EMERGENCY MEDICINE
Payer: MEDICARE

## 2024-08-25 ENCOUNTER — APPOINTMENT (OUTPATIENT)
Dept: CT IMAGING | Facility: HOSPITAL | Age: 74
End: 2024-08-25
Attending: EMERGENCY MEDICINE
Payer: MEDICARE

## 2024-08-25 VITALS
DIASTOLIC BLOOD PRESSURE: 87 MMHG | HEART RATE: 70 BPM | HEIGHT: 73 IN | OXYGEN SATURATION: 98 % | WEIGHT: 260 LBS | BODY MASS INDEX: 34.46 KG/M2 | RESPIRATION RATE: 16 BRPM | SYSTOLIC BLOOD PRESSURE: 156 MMHG | TEMPERATURE: 98 F

## 2024-08-25 DIAGNOSIS — R10.2 SUPRAPUBIC ABDOMINAL PAIN: Primary | ICD-10-CM

## 2024-08-25 LAB
ALBUMIN SERPL-MCNC: 4.6 G/DL (ref 3.2–4.8)
ALBUMIN/GLOB SERPL: 1.5 {RATIO} (ref 1–2)
ALP LIVER SERPL-CCNC: 93 U/L
ALT SERPL-CCNC: 28 U/L
ANION GAP SERPL CALC-SCNC: 8 MMOL/L (ref 0–18)
AST SERPL-CCNC: 32 U/L (ref ?–34)
BASOPHILS # BLD AUTO: 0.03 X10(3) UL (ref 0–0.2)
BASOPHILS NFR BLD AUTO: 0.4 %
BILIRUB SERPL-MCNC: 0.7 MG/DL (ref 0.2–1.1)
BILIRUB UR QL: NEGATIVE
BUN BLD-MCNC: 21 MG/DL (ref 9–23)
BUN/CREAT SERPL: 15.8 (ref 10–20)
CALCIUM BLD-MCNC: 9.5 MG/DL (ref 8.7–10.4)
CHLORIDE SERPL-SCNC: 110 MMOL/L (ref 98–112)
CLARITY UR: CLEAR
CO2 SERPL-SCNC: 28 MMOL/L (ref 21–32)
CREAT BLD-MCNC: 1.33 MG/DL
DEPRECATED RDW RBC AUTO: 45.1 FL (ref 35.1–46.3)
EGFRCR SERPLBLD CKD-EPI 2021: 56 ML/MIN/1.73M2 (ref 60–?)
EOSINOPHIL # BLD AUTO: 0.14 X10(3) UL (ref 0–0.7)
EOSINOPHIL NFR BLD AUTO: 1.8 %
ERYTHROCYTE [DISTWIDTH] IN BLOOD BY AUTOMATED COUNT: 14.6 % (ref 11–15)
GLOBULIN PLAS-MCNC: 3.1 G/DL (ref 2–3.5)
GLUCOSE BLD-MCNC: 117 MG/DL (ref 70–99)
GLUCOSE UR-MCNC: NORMAL MG/DL
HCT VFR BLD AUTO: 42.2 %
HGB BLD-MCNC: 15.2 G/DL
HGB UR QL STRIP.AUTO: NEGATIVE
IMM GRANULOCYTES # BLD AUTO: 0.05 X10(3) UL (ref 0–1)
IMM GRANULOCYTES NFR BLD: 0.6 %
KETONES UR-MCNC: NEGATIVE MG/DL
LEUKOCYTE ESTERASE UR QL STRIP.AUTO: NEGATIVE
LIPASE SERPL-CCNC: 41 U/L (ref 12–53)
LYMPHOCYTES # BLD AUTO: 1.35 X10(3) UL (ref 1–4)
LYMPHOCYTES NFR BLD AUTO: 17 %
MCH RBC QN AUTO: 30.3 PG (ref 26–34)
MCHC RBC AUTO-ENTMCNC: 36 G/DL (ref 31–37)
MCV RBC AUTO: 84.1 FL
MONOCYTES # BLD AUTO: 0.7 X10(3) UL (ref 0.1–1)
MONOCYTES NFR BLD AUTO: 8.8 %
NEUTROPHILS # BLD AUTO: 5.69 X10 (3) UL (ref 1.5–7.7)
NEUTROPHILS # BLD AUTO: 5.69 X10(3) UL (ref 1.5–7.7)
NEUTROPHILS NFR BLD AUTO: 71.4 %
NITRITE UR QL STRIP.AUTO: NEGATIVE
OSMOLALITY SERPL CALC.SUM OF ELEC: 306 MOSM/KG (ref 275–295)
PH UR: 7 [PH] (ref 5–8)
PLATELET # BLD AUTO: 212 10(3)UL (ref 150–450)
POTASSIUM SERPL-SCNC: 3.4 MMOL/L (ref 3.5–5.1)
PROT SERPL-MCNC: 7.7 G/DL (ref 5.7–8.2)
PROT UR-MCNC: NEGATIVE MG/DL
RBC # BLD AUTO: 5.02 X10(6)UL
SODIUM SERPL-SCNC: 146 MMOL/L (ref 136–145)
SP GR UR STRIP: 1.01 (ref 1–1.03)
UROBILINOGEN UR STRIP-ACNC: NORMAL
WBC # BLD AUTO: 8 X10(3) UL (ref 4–11)

## 2024-08-25 PROCEDURE — 80053 COMPREHEN METABOLIC PANEL: CPT | Performed by: EMERGENCY MEDICINE

## 2024-08-25 PROCEDURE — 99285 EMERGENCY DEPT VISIT HI MDM: CPT

## 2024-08-25 PROCEDURE — 81003 URINALYSIS AUTO W/O SCOPE: CPT | Performed by: EMERGENCY MEDICINE

## 2024-08-25 PROCEDURE — 74176 CT ABD & PELVIS W/O CONTRAST: CPT | Performed by: EMERGENCY MEDICINE

## 2024-08-25 PROCEDURE — 83690 ASSAY OF LIPASE: CPT | Performed by: EMERGENCY MEDICINE

## 2024-08-25 PROCEDURE — 36415 COLL VENOUS BLD VENIPUNCTURE: CPT

## 2024-08-25 PROCEDURE — 99284 EMERGENCY DEPT VISIT MOD MDM: CPT

## 2024-08-25 PROCEDURE — 85025 COMPLETE CBC W/AUTO DIFF WBC: CPT | Performed by: EMERGENCY MEDICINE

## 2024-08-25 RX ORDER — TRAMADOL HYDROCHLORIDE 50 MG/1
TABLET ORAL EVERY 6 HOURS PRN
Qty: 14 TABLET | Refills: 0 | Status: SHIPPED | OUTPATIENT
Start: 2024-08-25 | End: 2024-08-30

## 2024-08-25 NOTE — ED INITIAL ASSESSMENT (HPI)
Stanley arrived through triage for c/o R lower abdominal pain radiating to R flank x2-3 days, exacerbated when lying supine. Reported hx of diverticulitis. States he has been experiencing intermittent lower abdominal discomfort over the past month.

## 2024-08-25 NOTE — ED PROVIDER NOTES
Patient Seen in: [facility] Emergency Department    History     Chief Complaint   Patient presents with    Abdomen/Flank Pain       HPI    73-year-old male presents ER with complaint of suprapubic pain.  Patient with a past medical history of diverticulitis.  Patient states he been having pain now for the past month but worsening over the past few days.  Patient states the pain is worse when he lays flat.  Patient denies any bowel issues or urinary issues.    History reviewed.   Past Medical History:    Cholecystitis    Epidermal inclusion cyst    excision of cyst on back, 2013    High blood pressure    High cholesterol    Osteoarthritis    Other and unspecified hyperlipidemia    Prostate enlargement    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    Unspecified essential hypertension       History reviewed.   Past Surgical History:   Procedure Laterality Date    Cholecystectomy      Colonoscopy  2007    Colonoscopy N/A 1/2/2018    Procedure: COLONOSCOPY;  Surgeon: Shiv Cruz MD;  Location: [facility] ENDOSCOPY    Colonoscopy N/A 2/16/2021    Procedure: COLONOSCOPY;  Surgeon: Shiv Cruz MD;  Location: [facility] ENDOSCOPY    Kidney surgery  2012    left partial nephrectomy    Knee replacement surgery Left 2005    Right TKR 10/2015    Total knee replacement           Medications :  (Not in a hospital admission)       Family History   Problem Relation Age of Onset    Cancer Mother     Breast Cancer Mother     Alcohol and Other Disorders Associated Father         alcoholism       Smoking Status:   Social History     Socioeconomic History    Marital status:    Tobacco Use    Smoking status: Never    Smokeless tobacco: Never   Vaping Use    Vaping status: Never Used   Substance and Sexual Activity    Alcohol use: Yes     Alcohol/week: 4.0 standard drinks of alcohol     Types: 4 Glasses of wine per week    Drug use: No    Sexual activity: Not Currently     Partners: Female   Other Topics  Concern    Caffeine Concern Yes     Comment: coffee 2cups/day       ROS  All pertinent positives for the review of systems are mentioned in the HPI  All other organ systems are reviewed and are negative.    Constitutional and vital signs reviewed.      Social History and Family History elements reviewed from today, pertinent positives to the presenting problem noted.    Physical Exam     ED Triage Vitals [08/25/24 0826]   /87   Pulse 70   Resp 16   Temp 97.8 °F (36.6 °C)   Temp src Temporal   SpO2 98 %   O2 Device None (Room air)       All measures to prevent infection transmission during my interaction with the patient were taken. The patient was already wearing a droplet mask on my arrival to the room. Personal protective equipment including droplet mask, eye protection, and gloves were worn throughout the duration of the exam.  Handwashing was performed prior to and after the exam.  Stethoscope and any equipment used during my examination was cleaned with super sani-cloth germicidal wipes following the exam.     Physical Exam  Constitutional:       Appearance: He is well-developed.   HENT:      Head: Normocephalic and atraumatic.      Right Ear: External ear normal.      Left Ear: External ear normal.      Nose: Nose normal.   Eyes:      Conjunctiva/sclera: Conjunctivae normal.      Pupils: Pupils are equal, round, and reactive to light.   Cardiovascular:      Rate and Rhythm: Normal rate and regular rhythm.      Heart sounds: Normal heart sounds.   Pulmonary:      Effort: Pulmonary effort is normal.      Breath sounds: Normal breath sounds.   Abdominal:      General: Bowel sounds are normal.      Palpations: Abdomen is soft.      Tenderness: There is abdominal tenderness in the suprapubic area. There is no guarding or rebound.   Genitourinary:     Penis: Normal.       Prostate: Normal.      Rectum: Normal.   Musculoskeletal:         General: Normal range of motion.      Cervical back: Normal range of  motion and neck supple.   Skin:     General: Skin is warm and dry.   Neurological:      General: No focal deficit present.      Mental Status: He is alert and oriented to person, place, and time.      Deep Tendon Reflexes: Reflexes are normal and symmetric.         ED Course        Labs Reviewed   COMP METABOLIC PANEL (14) - Abnormal; Notable for the following components:       Result Value    Glucose 117 (*)     Sodium 146 (*)     Potassium 3.4 (*)     Creatinine 1.33 (*)     Calculated Osmolality 306 (*)     eGFR-Cr 56 (*)     All other components within normal limits   LIPASE - Normal   CBC WITH DIFFERENTIAL WITH PLATELET   URINALYSIS WITH CULTURE REFLEX         Imaging Results Available and Reviewed while in ED: CT ABDOMEN+PELVIS(CPT=74176)    Result Date: 8/25/2024  CONCLUSION:   No acute abnormality in the abdomen or pelvis.  No finding to explain abdominal or flank pain.  No renal or ureteral stones or hydronephrosis.  The appendix is unremarkable.  No bowel obstruction.  No biliary ductal dilatation.  No CT evidence of acute pancreatitis.  Prostatomegaly.  Stable calcified lesion adjacent to the left partial nephrectomy site, probably fat necrosis.  Fluid attenuation cyst are probably benign but incompletely characterized on this noncontrast exam.  Recommend nonemergent outpatient evaluation with CT urogram.    Dictated by (CST): Jeffrey Lemos MD on 8/25/2024 at 11:13 AM     Finalized by (CST): Jeffrey Lemos MD on 8/25/2024 at 11:19 AM         ED Medications Administered: Medications - No data to display      MDM     Vitals:    08/25/24 0826   BP: 156/87   Pulse: 70   Resp: 16   Temp: 97.8 °F (36.6 °C)   TempSrc: Temporal   SpO2: 98%   Weight: 117.9 kg   Height: 185.4 cm (6' 1\")     *I personally reviewed and interpreted all ED vitals.  I also personally reviewed all labs and imaging if ordered    Pulse Ox: 98%, Room air, Normal     Monitor Interpretation:   normal sinus rhythm    Differential Diagnosis/  Diagnostic Considerations: Diverticulitis, colitis, bladder infection.    Medical Record Review: I personally reviewed available prior medical records for any recent pertinent discharge summaries, testing, and procedures and reviewed those reports.    Complicating Factors: The patient already has does not have any pertinent problems on file. to contribute to the complexity of this ED evaluation.    Medical Decision Making  73-year-old male presents ER with complaint suprapubic pain.  Patient states pain is worse with movement which could be likely musculoskeletal.  Patient denies any nausea vomit diarrhea.  Patient okay to discharge home instructed follow-up with PCP symptoms continue.  Patient discharged home with tramadol for pain.    Problems Addressed:  Suprapubic abdominal pain: acute illness or injury    Amount and/or Complexity of Data Reviewed  Labs: ordered. Decision-making details documented in ED Course.  Radiology: ordered and independent interpretation performed. Decision-making details documented in ED Course.     Details: CT abdomen pelvis reviewed by myself.  Radiology read shows no acute intra-abdominal process.    Risk  Prescription drug management.        Condition upon leaving the department: Stable    Disposition and Plan     Clinical Impression:  1. Suprapubic abdominal pain        Disposition:  Discharge    Follow-up:  Wilbur Martin MD  [address] [place] [id number]  [phone]    Schedule an appointment as soon as possible for a visit  If symptoms worsen      Medications Prescribed:  Current Discharge Medication List        START taking these medications    Details   traMADol 50 MG Oral Tab Take 1-2 tablets ( mg total) by mouth every 6 (six) hours as needed.  Qty: 14 tablet, Refills: 0    Associated Diagnoses: Suprapubic abdominal pain

## 2024-08-26 ENCOUNTER — PATIENT OUTREACH (OUTPATIENT)
Dept: CASE MANAGEMENT | Age: 74
End: 2024-08-26

## 2024-08-27 ENCOUNTER — TELEPHONE (OUTPATIENT)
Dept: INTERNAL MEDICINE CLINIC | Facility: CLINIC | Age: 74
End: 2024-08-27

## 2024-08-27 ENCOUNTER — OFFICE VISIT (OUTPATIENT)
Dept: INTERNAL MEDICINE CLINIC | Facility: CLINIC | Age: 74
End: 2024-08-27
Payer: MEDICARE

## 2024-08-27 ENCOUNTER — HOSPITAL ENCOUNTER (OUTPATIENT)
Dept: GENERAL RADIOLOGY | Facility: HOSPITAL | Age: 74
Discharge: HOME | End: 2024-08-27
Attending: INTERNAL MEDICINE
Payer: MEDICARE

## 2024-08-27 ENCOUNTER — APPOINTMENT (OUTPATIENT)
Dept: LAB | Facility: HOSPITAL | Age: 74
End: 2024-08-27
Attending: INTERNAL MEDICINE
Payer: MEDICARE

## 2024-08-27 VITALS
DIASTOLIC BLOOD PRESSURE: 74 MMHG | HEIGHT: 73 IN | SYSTOLIC BLOOD PRESSURE: 124 MMHG | BODY MASS INDEX: 34.85 KG/M2 | WEIGHT: 263 LBS | HEART RATE: 59 BPM

## 2024-08-27 DIAGNOSIS — Z87.81 HISTORY OF COMPRESSION FRACTURE OF SPINE: ICD-10-CM

## 2024-08-27 DIAGNOSIS — M47.816 LUMBAR SPONDYLOSIS: Primary | ICD-10-CM

## 2024-08-27 DIAGNOSIS — M47.816 LUMBAR SPONDYLOSIS: ICD-10-CM

## 2024-08-27 DIAGNOSIS — M54.50 ACUTE BILATERAL LOW BACK PAIN WITHOUT SCIATICA: ICD-10-CM

## 2024-08-27 PROCEDURE — 72100 X-RAY EXAM L-S SPINE 2/3 VWS: CPT | Performed by: INTERNAL MEDICINE

## 2024-08-27 PROCEDURE — 99214 OFFICE O/P EST MOD 30 MIN: CPT | Performed by: INTERNAL MEDICINE

## 2024-08-27 NOTE — PROGRESS NOTES
Pt had recent ED visit, calling to offer [facility] follow-up apt (discharged 08/25)    Pt has existing apt w/Dr Mil Interiano 08/27 @11:00am  Closing encounter

## 2024-08-27 NOTE — TELEPHONE ENCOUNTER
Patient reports he went to the ER on Sunday due to side pain lower gut area.  Denies nausea or vomiting.  Pain occurs when trying to sit up from lying down or stand up from lying down.  Denies pain while sitting or standing.  Advised follow up appointment.  Dr. Martin out of office today.  Scheduled with Dr. Jaeger.  Future Appointments   Date Time Provider Department Center   8/27/2024 11:00 AM Mina Jaeger MD [facility]

## 2024-08-30 ENCOUNTER — HOSPITAL ENCOUNTER (EMERGENCY)
Facility: HOSPITAL | Age: 74
Discharge: HOME | End: 2024-08-30
Attending: EMERGENCY MEDICINE
Payer: MEDICARE

## 2024-08-30 ENCOUNTER — TELEPHONE (OUTPATIENT)
Dept: INTERNAL MEDICINE CLINIC | Facility: CLINIC | Age: 74
End: 2024-08-30

## 2024-08-30 VITALS
DIASTOLIC BLOOD PRESSURE: 84 MMHG | HEART RATE: 59 BPM | SYSTOLIC BLOOD PRESSURE: 155 MMHG | OXYGEN SATURATION: 99 % | TEMPERATURE: 98 F | RESPIRATION RATE: 20 BRPM

## 2024-08-30 DIAGNOSIS — N30.90 CYSTITIS: Primary | ICD-10-CM

## 2024-08-30 LAB
ALBUMIN SERPL-MCNC: 4.4 G/DL (ref 3.2–4.8)
ALBUMIN/GLOB SERPL: 1.4 {RATIO} (ref 1–2)
ALP LIVER SERPL-CCNC: 92 U/L
ALT SERPL-CCNC: 25 U/L
ANION GAP SERPL CALC-SCNC: 7 MMOL/L (ref 0–18)
AST SERPL-CCNC: 27 U/L (ref ?–34)
BASOPHILS # BLD AUTO: 0.02 X10(3) UL (ref 0–0.2)
BASOPHILS NFR BLD AUTO: 0.3 %
BILIRUB SERPL-MCNC: 0.6 MG/DL (ref 0.2–1.1)
BILIRUB UR QL: NEGATIVE
BUN BLD-MCNC: 30 MG/DL (ref 9–23)
BUN/CREAT SERPL: 16 (ref 10–20)
CALCIUM BLD-MCNC: 9.8 MG/DL (ref 8.7–10.4)
CHLORIDE SERPL-SCNC: 109 MMOL/L (ref 98–112)
CLARITY UR: CLEAR
CO2 SERPL-SCNC: 26 MMOL/L (ref 21–32)
COLOR UR: YELLOW
CREAT BLD-MCNC: 1.88 MG/DL
DEPRECATED RDW RBC AUTO: 43.7 FL (ref 35.1–46.3)
EGFRCR SERPLBLD CKD-EPI 2021: 37 ML/MIN/1.73M2 (ref 60–?)
EOSINOPHIL # BLD AUTO: 0.08 X10(3) UL (ref 0–0.7)
EOSINOPHIL NFR BLD AUTO: 1.1 %
ERYTHROCYTE [DISTWIDTH] IN BLOOD BY AUTOMATED COUNT: 14.2 % (ref 11–15)
GLOBULIN PLAS-MCNC: 3.1 G/DL (ref 2–3.5)
GLUCOSE BLD-MCNC: 141 MG/DL (ref 70–99)
GLUCOSE UR-MCNC: NORMAL MG/DL
HCT VFR BLD AUTO: 40.8 %
HGB BLD-MCNC: 14.5 G/DL
HGB UR QL STRIP.AUTO: NEGATIVE
HYALINE CASTS #/AREA URNS AUTO: PRESENT /LPF
IMM GRANULOCYTES # BLD AUTO: 0.03 X10(3) UL (ref 0–1)
IMM GRANULOCYTES NFR BLD: 0.4 %
KETONES UR-MCNC: NEGATIVE MG/DL
LEUKOCYTE ESTERASE UR QL STRIP.AUTO: 75
LYMPHOCYTES # BLD AUTO: 1.27 X10(3) UL (ref 1–4)
LYMPHOCYTES NFR BLD AUTO: 17.6 %
MCH RBC QN AUTO: 30 PG (ref 26–34)
MCHC RBC AUTO-ENTMCNC: 35.5 G/DL (ref 31–37)
MCV RBC AUTO: 84.3 FL
MONOCYTES # BLD AUTO: 0.69 X10(3) UL (ref 0.1–1)
MONOCYTES NFR BLD AUTO: 9.6 %
NEUTROPHILS # BLD AUTO: 5.12 X10 (3) UL (ref 1.5–7.7)
NEUTROPHILS # BLD AUTO: 5.12 X10(3) UL (ref 1.5–7.7)
NEUTROPHILS NFR BLD AUTO: 71 %
NITRITE UR QL STRIP.AUTO: NEGATIVE
OSMOLALITY SERPL CALC.SUM OF ELEC: 303 MOSM/KG (ref 275–295)
PH UR: 6 [PH] (ref 5–8)
PLATELET # BLD AUTO: 198 10(3)UL (ref 150–450)
POTASSIUM SERPL-SCNC: 3.2 MMOL/L (ref 3.5–5.1)
PROT SERPL-MCNC: 7.5 G/DL (ref 5.7–8.2)
PROT UR-MCNC: 20 MG/DL
RBC # BLD AUTO: 4.84 X10(6)UL
SODIUM SERPL-SCNC: 142 MMOL/L (ref 136–145)
SP GR UR STRIP: 1.02 (ref 1–1.03)
UROBILINOGEN UR STRIP-ACNC: NORMAL
WBC # BLD AUTO: 7.2 X10(3) UL (ref 4–11)

## 2024-08-30 PROCEDURE — 99284 EMERGENCY DEPT VISIT MOD MDM: CPT

## 2024-08-30 PROCEDURE — 96374 THER/PROPH/DIAG INJ IV PUSH: CPT

## 2024-08-30 PROCEDURE — 85025 COMPLETE CBC W/AUTO DIFF WBC: CPT | Performed by: EMERGENCY MEDICINE

## 2024-08-30 PROCEDURE — 80053 COMPREHEN METABOLIC PANEL: CPT | Performed by: EMERGENCY MEDICINE

## 2024-08-30 PROCEDURE — 81001 URINALYSIS AUTO W/SCOPE: CPT | Performed by: EMERGENCY MEDICINE

## 2024-08-30 PROCEDURE — 81001 URINALYSIS AUTO W/SCOPE: CPT

## 2024-08-30 PROCEDURE — 80053 COMPREHEN METABOLIC PANEL: CPT

## 2024-08-30 PROCEDURE — 87086 URINE CULTURE/COLONY COUNT: CPT | Performed by: EMERGENCY MEDICINE

## 2024-08-30 PROCEDURE — 85025 COMPLETE CBC W/AUTO DIFF WBC: CPT

## 2024-08-30 PROCEDURE — 87086 URINE CULTURE/COLONY COUNT: CPT

## 2024-08-30 RX ORDER — CEPHALEXIN 500 MG/1
500 CAPSULE ORAL 2 TIMES DAILY
Qty: 14 CAPSULE | Refills: 0 | Status: SHIPPED | OUTPATIENT
Start: 2024-08-30 | End: 2024-09-06

## 2024-08-30 RX ORDER — IBUPROFEN 600 MG/1
600 TABLET, FILM COATED ORAL EVERY 8 HOURS PRN
Qty: 30 TABLET | Refills: 0 | Status: SHIPPED | OUTPATIENT
Start: 2024-08-30

## 2024-08-30 RX ORDER — CEPHALEXIN 500 MG/1
500 CAPSULE ORAL ONCE
Status: COMPLETED | OUTPATIENT
Start: 2024-08-30 | End: 2024-08-30

## 2024-08-30 RX ORDER — KETOROLAC TROMETHAMINE 15 MG/ML
15 INJECTION, SOLUTION INTRAMUSCULAR; INTRAVENOUS ONCE
Status: COMPLETED | OUTPATIENT
Start: 2024-08-30 | End: 2024-08-30

## 2024-08-30 NOTE — TELEPHONE ENCOUNTER
I have reviewed the notes and testing done in the emergency room.  I have also reviewed Dr. Jaeger's note.  I agree with the plan.

## 2024-08-30 NOTE — TELEPHONE ENCOUNTER
Dr. Martin, patient wanted you to be updated: I did sent patient back to the ER for re-evaluation since has worsening symptoms.    -he saw Dr. Jaeger on 8/27 for ER f/u    Patient calling stating that is having worsening entire front of lower ABD pain. Did some work in the back yard and now the pain is constant- at times sharp when bends the wrong way; heavy/bloated feeling also describing as \"when you do too many sit ups.\" also having decreased urination since yesterday.

## 2024-08-30 NOTE — ED INITIAL ASSESSMENT (HPI)
Pt c/o of lower abdominal pain. Pt states it started 1 month ago but pain got better but the last days pain is worsening. Pt also feels like he is not urinated as much as before. Denies pain when he urinated.   Hx of diverticulitis.    Denies n/v.

## 2024-08-30 NOTE — DISCHARGE INSTRUCTIONS
Take the antibiotic prescribed to you today as directed.  Make sure to finish the entire course even if you start to feel better.  Given your first dose of antibiotic in the emergency department this evening.  Your next dose is due tomorrow morning at home.    Take ibuprofen 600 mg every 6-8 hours as needed for pain.  He had a dose in the emergency department this evening.  Your next dose is due tomorrow morning as well.    See primary care if not improving by Monday or Tuesday.    Return to the ER if you develop worsening symptoms, fever, vomiting, or any emergent concerns.

## 2024-09-01 NOTE — ED PROVIDER NOTES
Patient Seen in: [facility] Emergency Department      History     Chief Complaint   Patient presents with    Abdomen/Flank Pain     Stated Complaint: Abdominal pain    Subjective:   HPI    73-year-old male presents for evaluation of abdominal pain.  Patient reports lower abdominal pain for the past month, initially improving but now worsening over the past several days.  Reports decreased urination, no dysuria.  Denies fever, chills, chest pain or pressure, difficulty breathing, vomiting, diarrhea.    Objective:   Past Medical History:    Cholecystitis    Epidermal inclusion cyst    excision of cyst on back, 2013    High blood pressure    High cholesterol    Osteoarthritis    Other and unspecified hyperlipidemia    Prostate enlargement    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    Unspecified essential hypertension              Past Surgical History:   Procedure Laterality Date    Cholecystectomy      Colonoscopy  2007    Colonoscopy N/A 1/2/2018    Procedure: COLONOSCOPY;  Surgeon: Shiv Cruz MD;  Location: [facility] ENDOSCOPY    Colonoscopy N/A 2/16/2021    Procedure: COLONOSCOPY;  Surgeon: Shiv Cruz MD;  Location: [facility] ENDOSCOPY    Kidney surgery  2012    left partial nephrectomy    Knee replacement surgery Left 2005    Right TKR 10/2015    Total knee replacement                  Social History     Socioeconomic History    Marital status:    Tobacco Use    Smoking status: Never     Passive exposure: Never    Smokeless tobacco: Never   Vaping Use    Vaping status: Never Used   Substance and Sexual Activity    Alcohol use: Yes     Alcohol/week: 4.0 standard drinks of alcohol     Types: 4 Glasses of wine per week    Drug use: No    Sexual activity: Not Currently     Partners: Female   Other Topics Concern    Caffeine Concern Yes     Comment: coffee 2cups/day     Social Determinants of Health     Financial Resource Strain: Low Risk  (5/30/2023)    Financial Resource  Strain     Difficulty of Paying Living Expenses: Not very hard     Med Affordability: No   Transportation Needs: No Transportation Needs (5/30/2023)    Transportation Needs     Lack of Transportation: No              Review of Systems    Positive for stated Chief Complaint: Abdomen/Flank Pain    Other systems are as noted in HPI.  Constitutional and vital signs reviewed.      All other systems reviewed and negative except as noted above.    Physical Exam     ED Triage Vitals   BP 08/30/24 1409 135/73   Pulse 08/30/24 1409 72   Resp 08/30/24 1409 18   Temp 08/30/24 1409 97.8 °F (36.6 °C)   Temp src 08/30/24 1409 Temporal   SpO2 08/30/24 1409 98 %   O2 Device 08/30/24 1816 None (Room air)       Current Vitals:   No data recorded        Physical Exam  Vitals and nursing note reviewed.   Constitutional:       General: He is not in acute distress.     Appearance: He is well-developed.   HENT:      Head: Normocephalic and atraumatic.   Eyes:      Conjunctiva/sclera: Conjunctivae normal.   Cardiovascular:      Rate and Rhythm: Normal rate and regular rhythm.      Heart sounds: Normal heart sounds.   Pulmonary:      Effort: Pulmonary effort is normal. No respiratory distress.      Breath sounds: Normal breath sounds.   Abdominal:      General: Bowel sounds are normal.      Palpations: Abdomen is soft.      Tenderness: There is no abdominal tenderness.   Musculoskeletal:         General: Normal range of motion.      Cervical back: Normal range of motion and neck supple.   Skin:     General: Skin is warm and dry.      Findings: No rash.   Neurological:      General: No focal deficit present.      Mental Status: He is alert and oriented to person, place, and time.               ED Course     Labs Reviewed   URINALYSIS WITH CULTURE REFLEX - Abnormal; Notable for the following components:       Result Value    Protein Urine 20 (*)     Leukocyte Esterase Urine 75 (*)     WBC Urine 11-20 (*)     Hyaline Casts Present (*)     All  other components within normal limits   COMP METABOLIC PANEL (14) - Abnormal; Notable for the following components:    Glucose 141 (*)     Potassium 3.2 (*)     BUN 30 (*)     Creatinine 1.88 (*)     Calculated Osmolality 303 (*)     eGFR-Cr 37 (*)     All other components within normal limits   CBC WITH DIFFERENTIAL WITH PLATELET   RAINBOW DRAW LAVENDER   RAINBOW DRAW LIGHT GREEN   URINE CULTURE, ROUTINE                      MDM                                         Medical Decision Making  Differential diagnosis includes but is not limited to ureteral stone, pyelonephritis, cystitis, musculoskeletal strain, PUD, gastritis, aortic aneurysm or dissection, constipation    Well-appearing patient, abdomen is soft and nontender, patient had CT imaging on 8/25/2024.  UA consistent with possible infection, will treat.  Advise close outpatient follow-up as well as return precautions.  Patient and wife at the bedside verbalized understanding of and agreement with this plan.    Problems Addressed:  Cystitis: acute illness or injury    Amount and/or Complexity of Data Reviewed  External Data Reviewed: labs.     Details: Slight increase in creatinine, otherwise CBC and CMP stable compared to labs from 8/25/2024  Labs: ordered.    Risk  Prescription drug management.        Disposition and Plan     Clinical Impression:  1. Cystitis         Disposition:  Discharge  8/30/2024  6:06 pm    Follow-up:  Wilbur Martin MD  [address] [place] [address]  [phone]    Follow up  As needed    We recommend that you schedule follow up care with a primary care provider within the next three months to obtain basic health screening including reassessment of your blood pressure.      Medications Prescribed:  Discharge Medication List as of 8/30/2024  6:22 PM        START taking these medications    Details   cephALEXin (KEFLEX) 500 MG Oral Cap Take 1 capsule (500 mg total) by mouth 2 (two) times daily for 7 days., Normal, Disp-14  capsule, R-0      ibuprofen 600 MG Oral Tab Take 1 tablet (600 mg total) by mouth every 8 (eight) hours as needed for Pain or Fever., Normal, Disp-30 tablet, R-0

## 2024-09-03 ENCOUNTER — TELEPHONE (OUTPATIENT)
Dept: INTERNAL MEDICINE CLINIC | Facility: CLINIC | Age: 74
End: 2024-09-03

## 2024-09-03 ENCOUNTER — TELEPHONE (OUTPATIENT)
Facility: CLINIC | Age: 74
End: 2024-09-03

## 2024-09-03 NOTE — TELEPHONE ENCOUNTER
Patient called to find out if he needs to postpone his upcoming procedure due to abdominal issues he is having.  Patient has been seeing PCP for these symptoms.  Please call.

## 2024-09-03 NOTE — TELEPHONE ENCOUNTER
Dr Martin, please advise    Can you add a follow up appt this week?    Patient (name and  verified) is calling to schedule a follow up appointment with PCP only. Patient was seen in the ER, twice in the past week. Declines appt with other provider. Patient states that the symptoms are the same, not worse but manageable. Patient is supposed to have a colonoscopy next week. Patient will doris the GI office to inform of recent ER visits.    Patient informed that PCP is not in the office today and will receive a call back tomorrow.

## 2024-09-04 NOTE — TELEPHONE ENCOUNTER
Dr Cruz,    Pt is concerned about some recent flank pain & cystitis he has been experiencing    He has been to the ER twice recently, 08/25/2024 & 08/30/2024    He is on his 5th day of Keflex antibiotic which as caused some constipation but he is feeling a bit better    He is taking stool softeners for the constipation    Pt calling to make sure he can proceed with scheduled colonoscopy on 09/10/2024    His CT scan did not show any acute process.    I told the pt I would make sure with you that it is ok for him to proceed with the colonoscopy

## 2024-09-05 NOTE — TELEPHONE ENCOUNTER
I spoke to the pt and reviewed Dr Cruz's recommendations below with the pt and he voices understanding    Pt states he is feeling much better    He has a follow up with his PCP tomorrow

## 2024-09-05 NOTE — TELEPHONE ENCOUNTER
Marino Grady.    CT abdomen and pelvis with IV contrast 8/25/2024 apparently to evaluate this pain did not show anything wrong to be causing his symptoms or anything that would make a colonoscopy exam unsafe.  By the CT scan, it should be safe to proceed with the planned colonoscopy examination.    If still constipated in the days leading up to the colonoscopy examination, should start taking MiraLAX once-twice daily for 3-5 days before the bowel prep.    - cb

## 2024-09-05 NOTE — TELEPHONE ENCOUNTER
I called and left message for patient to call us back    Visit is for ER follow up, he can schedule for next available wellness visit or discuss with PCP when he is seen.     Please confirm he can make visit scheduled for below date and time    Future Appointments   Date Time Provider Department Center   9/6/2024  8:00 AM Wilbur Martin MD [facility]   9/10/2024  7:45 AM [facility], PROCEDURE [id number] None   9/30/2024  3:00 PM [facility] MRI RM1 (1.5T WIDE) [facility] MRI EDW [facility]   1/3/2025 11:00 AM Wilbur Martin MD [facility]   5/12/2025  9:30 AM Hossein Pace MD [facility]

## 2024-09-05 NOTE — TELEPHONE ENCOUNTER
Patient returned call.  Notified of next available for wellness visit.  Friday will be to address acute issues and emergency room follow up.  He verbalized understanding and compliance.

## 2024-09-06 ENCOUNTER — OFFICE VISIT (OUTPATIENT)
Dept: INTERNAL MEDICINE CLINIC | Facility: CLINIC | Age: 74
End: 2024-09-06
Payer: MEDICARE

## 2024-09-06 VITALS
RESPIRATION RATE: 18 BRPM | SYSTOLIC BLOOD PRESSURE: 128 MMHG | BODY MASS INDEX: 34.85 KG/M2 | TEMPERATURE: 98 F | HEIGHT: 73 IN | HEART RATE: 62 BPM | WEIGHT: 263 LBS | DIASTOLIC BLOOD PRESSURE: 78 MMHG

## 2024-09-06 DIAGNOSIS — R10.2 SUPRAPUBIC PAIN: Primary | ICD-10-CM

## 2024-09-06 DIAGNOSIS — E03.9 HYPOTHYROIDISM, UNSPECIFIED TYPE: ICD-10-CM

## 2024-09-06 DIAGNOSIS — L60.8 DEFORMITY OF TOENAIL: ICD-10-CM

## 2024-09-06 DIAGNOSIS — N18.30 STAGE 3 CHRONIC KIDNEY DISEASE, UNSPECIFIED WHETHER STAGE 3A OR 3B CKD (HCC): ICD-10-CM

## 2024-09-06 DIAGNOSIS — M47.816 LUMBAR SPONDYLOSIS: ICD-10-CM

## 2024-09-06 PROBLEM — L02.92 BOIL: Status: RESOLVED | Noted: 2021-11-16 | Resolved: 2024-09-06

## 2024-09-06 PROBLEM — L02.91 ABSCESS: Status: RESOLVED | Noted: 2021-11-16 | Resolved: 2024-09-06

## 2024-09-06 PROCEDURE — 99214 OFFICE O/P EST MOD 30 MIN: CPT | Performed by: INTERNAL MEDICINE

## 2024-09-06 PROCEDURE — G2211 COMPLEX E/M VISIT ADD ON: HCPCS | Performed by: INTERNAL MEDICINE

## 2024-09-06 NOTE — PROGRESS NOTES
HPI:    Patient ID: Stanley De Santiago is a 73 year old male.    HPI  Patient is here for follow-up on recent emergency room visits.  I last saw him in April 26.  At that time we increased dose of levothyroxine from 75 to 100 mcg a day.  He saw urology once since that time for history of renal cell carcinoma status post resection.  Son to return to medicine doctor on August 27 for low back pain.  X-ray showed multiple likely compression deformities.  Nothing new.  He was in the emergency room on August 25 with suprapubic pain.  CT scan was unremarkable.  Negative UA and CBC.  CMP showed chronic abnormalities with mild hypokalemia and mild decrease in GFR.  He returned to the emergency room on September 1 with a flare of the lower abdominal pain.  At that time urinalysis showed signs of UTI.  Ultimately the culture was negative.  He was sent home on cephalexin and ibuprofen.  Blood work at that time remarkable for a GFR that is going down to 37.  This is low for the patient but he has gone up and down previously.  Blood work otherwise unremarkable.  Potassium still borderline low at 3.2.    No further back pain. Still with pain in the suprapubic area. Worse with bend over an stand up. Constant mild pain in right flank at 1-2/10. Suprapubic pain is intermittent. He would get shooting pain across lower abd with positional changes. He is done with the antibiotics. He feels much better now.     In the last 2 days, he felt great yesterday except for mild pain in the right flank. This morning, it feels like hunger and too many sit ups- like a muscle. No urine issue- no dysruria, frequency, or urgency. He notes some increased swelling in the legs, left more than right. He is going to [place] in April.     Patient Active Problem List   Diagnosis    Benign prostatic hyperplasia without lower urinary tract symptoms    Essential hypertension    Hyperlipidemia    Osteoarthrosis    Osteoarthrosis, unspecified whether  generalized or localized, lower leg    Primary osteoarthritis of right knee    Left knee pain    Diverticulosis    New onset atrial flutter (HCC)    Atrial flutter with rapid ventricular response (HCC)    Arthropathy, lower leg    Benign neoplasm of skin    Calculus of kidney    Cholelithiasis    Contusion    Hematuria    Renal cell carcinoma (HCC)    Hypokalemia    Azotemia    Hyperglycemia    Atrial fibrillation and flutter (HCC)    Nodular prostate with urinary obstruction    Pain in joint, lower leg    Pain in joint, pelvic region and thigh    Primary localized osteoarthrosis, lower leg          HISTORY:  Past Medical History:    Arrhythmia    Cholecystitis    Disorder of thyroid    Epidermal inclusion cyst    excision of cyst on back, 2013    High blood pressure    High cholesterol    Osteoarthritis    Other and unspecified hyperlipidemia    Prostate enlargement    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    Unspecified essential hypertension      Past Surgical History:   Procedure Laterality Date    Cholecystectomy      Colonoscopy  2007    Colonoscopy N/A 1/2/2018    Procedure: COLONOSCOPY;  Surgeon: Shiv Cruz MD;  Location: [facility] ENDOSCOPY    Colonoscopy N/A 2/16/2021    Procedure: COLONOSCOPY;  Surgeon: Shiv Cruz MD;  Location: [facility] ENDOSCOPY    Kidney surgery  2012    left partial nephrectomy    Knee replacement surgery Left 2005    Right TKR 10/2015    Total knee replacement        Family History   Problem Relation Age of Onset    Cancer Mother     Breast Cancer Mother     Alcohol and Other Disorders Associated Father         alcoholism      Social History     Socioeconomic History    Marital status:    Tobacco Use    Smoking status: Never     Passive exposure: Never    Smokeless tobacco: Never   Vaping Use    Vaping status: Never Used   Substance and Sexual Activity    Alcohol use: Yes     Alcohol/week: 4.0 standard drinks of alcohol     Types: 4 Glasses of wine  per week    Drug use: No    Sexual activity: Not Currently     Partners: Female   Other Topics Concern    Caffeine Concern Yes     Comment: coffee 2cups/day     Social Determinants of Health     Financial Resource Strain: Low Risk  (5/30/2023)    Financial Resource Strain     Difficulty of Paying Living Expenses: Not very hard     Med Affordability: No   Transportation Needs: No Transportation Needs (5/30/2023)    Transportation Needs     Lack of Transportation: No          Review of Systems          Current Outpatient Medications   Medication Sig Dispense Refill    cephALEXin (KEFLEX) 500 MG Oral Cap Take 1 capsule (500 mg total) by mouth 2 (two) times daily for 7 days. 14 capsule 0    ibuprofen 600 MG Oral Tab Take 1 tablet (600 mg total) by mouth every 8 (eight) hours as needed for Pain or Fever. 30 tablet 0    atorvastatin 20 MG Oral Tab Take 1 tablet (20 mg total) by mouth nightly. Watch muscle weakness 90 tablet 3    oxybutynin ER 10 MG Oral Tablet 24 Hr Take 1 tablet (10 mg total) by mouth daily. 90 tablet 3    amLODIPine 10 MG Oral Tab Take 1 tablet (10 mg total) by mouth daily. 90 tablet 3    metoprolol succinate ER 25 MG Oral Tablet 24 Hr Take 1 tablet (25 mg total) by mouth daily.      levothyroxine 100 MCG Oral Tab Take 1 tablet (100 mcg total) by mouth before breakfast. 90 tablet 1    Na Sulfate-K Sulfate-Mg Sulf (SUPREP BOWEL PREP KIT) 17.5-3.13-1.6 GM/177ML Oral Solution Take as directed 1 each 0    amiodarone 200 MG Oral Tab amiodarone 200 mg tablet, [RxNorm: 076447]      Multiple Vitamin (MULTIVITAMIN ADULT OR) Take by mouth.       Allergies:No Known Allergies     PHYSICAL EXAM:   /78 (BP Location: Left arm, Patient Position: Sitting, Cuff Size: large)   Pulse 62   Temp 98 °F (36.7 °C) (Other)   Resp 18   Ht 6' 1\" (1.854 m)   Wt 263 lb (119.3 kg)   BMI 34.70 kg/m²      Physical Exam  Constitutional:       Appearance: Normal appearance.   Cardiovascular:      Rate and Rhythm: Normal rate  and regular rhythm.      Pulses: Normal pulses.      Heart sounds: Normal heart sounds.   Pulmonary:      Effort: Pulmonary effort is normal.      Breath sounds: Normal breath sounds.   Abdominal:      General: Abdomen is flat.      Palpations: Abdomen is soft.      Tenderness: There is no abdominal tenderness. There is no right CVA tenderness, left CVA tenderness or guarding.      Hernia: No hernia is present.   Genitourinary:     Penis: Normal.       Testes: Normal.   Musculoskeletal:      Right lower leg: Edema present.      Left lower leg: Edema present.   Skin:     Findings: No erythema or rash.   Neurological:      Mental Status: He is alert.   Psychiatric:         Mood and Affect: Mood normal.          Wt Readings from Last 6 Encounters:   09/06/24 263 lb (119.3 kg)   08/27/24 263 lb (119.3 kg)   08/25/24 260 lb (117.9 kg)   04/26/24 266 lb (120.7 kg)   09/03/24 262 lb (118.8 kg)   11/29/23 263 lb (119.3 kg)             ASSESSMENT/PLAN:   1. Suprapubic pain  Patient with recent episode of suprapubic pain.  Etiology not entirely clear.  Clearly has moderate to advanced lumbar disc disease.  X-ray showed possible compression fractures.  We will await the results of the MRI which is upcoming.  Seems to be better now after the Keflex.  The second urinalysis from the emergency room showed a positive evidence of UTI but ultimately culture was negative.  Things are better after Keflex.  Question of possible deeper infection such as prostatitis.  We will not give any further treatment right now.  Will see how he does in the days following discontinuation of the antibiotics.  He is to let us know.  Also await MRI.  No evidence of diverticulitis or any other abdominal or pelvic pathology.  - Urinalysis with Culture Reflex    2. Stage 3 chronic kidney disease, unspecified whether stage 3a or 3b CKD (HCC)  Somewhat worse when last checked.  Recheck in 1 week.  - Basic Metabolic Panel (8); Future    3. Lumbar  spondylosis  Significant lumbar disease noted on x-ray.  Await MRI results.  This may be also contributing to the abdominal pain particular the shooting pain across the lower abdomen.  - Basic Metabolic Panel (8); Future    4. Hypothyroidism, unspecified type  Check TSH.  Adjust medication if needed.  - Assay, Thyroid Stim Hormone; Future    5. Deformity of toenail  Patient requests referral to podiatry.  - Podiatry Referral - [facility] This Visit:  Requested Prescriptions      No prescriptions requested or ordered in this encounter       Imaging & Referrals:  None         Wilbur Martin MD

## 2024-09-10 ENCOUNTER — HOSPITAL ENCOUNTER (OUTPATIENT)
Facility: HOSPITAL | Age: 74
Setting detail: HOSPITAL OUTPATIENT SURGERY
Discharge: HOME | End: 2024-09-10
Attending: INTERNAL MEDICINE | Admitting: INTERNAL MEDICINE
Payer: MEDICARE

## 2024-09-10 ENCOUNTER — ANESTHESIA EVENT (OUTPATIENT)
Dept: ENDOSCOPY | Facility: HOSPITAL | Age: 74
End: 2024-09-10
Payer: MEDICARE

## 2024-09-10 ENCOUNTER — ANESTHESIA (OUTPATIENT)
Dept: ENDOSCOPY | Facility: HOSPITAL | Age: 74
End: 2024-09-10
Payer: MEDICARE

## 2024-09-10 VITALS
SYSTOLIC BLOOD PRESSURE: 123 MMHG | HEIGHT: 73 IN | HEART RATE: 52 BPM | BODY MASS INDEX: 34.72 KG/M2 | DIASTOLIC BLOOD PRESSURE: 71 MMHG | OXYGEN SATURATION: 96 % | WEIGHT: 262 LBS | RESPIRATION RATE: 14 BRPM

## 2024-09-10 DIAGNOSIS — K63.5 POLYP OF SIGMOID COLON, UNSPECIFIED TYPE: ICD-10-CM

## 2024-09-10 DIAGNOSIS — K63.5 POLYP OF TRANSVERSE COLON, UNSPECIFIED TYPE: Primary | ICD-10-CM

## 2024-09-10 DIAGNOSIS — K64.9 HEMORRHOIDS, UNSPECIFIED HEMORRHOID TYPE: ICD-10-CM

## 2024-09-10 DIAGNOSIS — Z86.010 H/O ADENOMATOUS POLYP OF COLON: ICD-10-CM

## 2024-09-10 PROCEDURE — 0DBN8ZX EXCISION OF SIGMOID COLON, VIA NATURAL OR ARTIFICIAL OPENING ENDOSCOPIC, DIAGNOSTIC: ICD-10-PCS | Performed by: INTERNAL MEDICINE

## 2024-09-10 PROCEDURE — 0DBL8ZX EXCISION OF TRANSVERSE COLON, VIA NATURAL OR ARTIFICIAL OPENING ENDOSCOPIC, DIAGNOSTIC: ICD-10-PCS | Performed by: INTERNAL MEDICINE

## 2024-09-10 PROCEDURE — 45385 COLONOSCOPY W/LESION REMOVAL: CPT | Performed by: INTERNAL MEDICINE

## 2024-09-10 RX ORDER — ONDANSETRON 2 MG/ML
4 INJECTION INTRAMUSCULAR; INTRAVENOUS ONCE AS NEEDED
Status: DISCONTINUED | OUTPATIENT
Start: 2024-09-10 | End: 2024-09-10

## 2024-09-10 RX ORDER — LIDOCAINE HYDROCHLORIDE 10 MG/ML
INJECTION, SOLUTION EPIDURAL; INFILTRATION; INTRACAUDAL; PERINEURAL AS NEEDED
Status: DISCONTINUED | OUTPATIENT
Start: 2024-09-10 | End: 2024-09-10 | Stop reason: SURG

## 2024-09-10 RX ORDER — NALOXONE HYDROCHLORIDE 0.4 MG/ML
0.08 INJECTION, SOLUTION INTRAMUSCULAR; INTRAVENOUS; SUBCUTANEOUS ONCE AS NEEDED
Status: DISCONTINUED | OUTPATIENT
Start: 2024-09-10 | End: 2024-09-10

## 2024-09-10 RX ORDER — SODIUM CHLORIDE, SODIUM LACTATE, POTASSIUM CHLORIDE, CALCIUM CHLORIDE 600; 310; 30; 20 MG/100ML; MG/100ML; MG/100ML; MG/100ML
INJECTION, SOLUTION INTRAVENOUS CONTINUOUS
Status: DISCONTINUED | OUTPATIENT
Start: 2024-09-10 | End: 2024-09-10

## 2024-09-10 RX ADMIN — SODIUM CHLORIDE, SODIUM LACTATE, POTASSIUM CHLORIDE, CALCIUM CHLORIDE: 600; 310; 30; 20 INJECTION, SOLUTION INTRAVENOUS at 08:19:00

## 2024-09-10 RX ADMIN — LIDOCAINE HYDROCHLORIDE 50 MG: 10 INJECTION, SOLUTION EPIDURAL; INFILTRATION; INTRACAUDAL; PERINEURAL at 07:52:00

## 2024-09-10 RX ADMIN — SODIUM CHLORIDE, SODIUM LACTATE, POTASSIUM CHLORIDE, CALCIUM CHLORIDE: 600; 310; 30; 20 INJECTION, SOLUTION INTRAVENOUS at 07:47:00

## 2024-09-10 NOTE — H&P
History & Physical Examination    Patient Name: Stanley De Santiago  MRN: [id number]  [facility]: [id number]  YOB: 1950    Diagnosis: History sessile serrated adenoma colon polyps    PRESENT ILLNESS: 73-year-old gentleman with history hypertension, prostatic hyperplasia dyslipidemia atrial flutter possible amiodarone therapy; returns for follow-up colonoscopy examination    My previous colonoscopy examination 2021 was significant for 12+ mm sessile polyp removed from mid transverse colon, sessile serrated adenoma      BMI Readings from Last 1 Encounters:   09/10/24 34.57 kg/m²         Medications Prior to Admission   Medication Sig Dispense Refill Last Dose    [] cephALEXin (KEFLEX) 500 MG Oral Cap Take 1 capsule (500 mg total) by mouth 2 (two) times daily for 7 days. 14 capsule 0     ibuprofen 600 MG Oral Tab Take 1 tablet (600 mg total) by mouth every 8 (eight) hours as needed for Pain or Fever. 30 tablet 0     oxybutynin ER 10 MG Oral Tablet 24 Hr Take 1 tablet (10 mg total) by mouth daily. 90 tablet 3 9/10/2024 at 0530    amLODIPine 10 MG Oral Tab Take 1 tablet (10 mg total) by mouth daily. 90 tablet 3 9/10/2024 at 0530    metoprolol succinate ER 25 MG Oral Tablet 24 Hr Take 1 tablet (25 mg total) by mouth daily.   9/10/2024 at 0530    amiodarone 200 MG Oral Tab amiodarone 200 mg tablet, [RxNorm: 760922]   9/10/2024 at 0530    Multiple Vitamin (MULTIVITAMIN ADULT OR) Take by mouth.   2024 at 0530    [] traMADol 50 MG Oral Tab Take 1-2 tablets ( mg total) by mouth every 6 (six) hours as needed. 14 tablet 0     atorvastatin 20 MG Oral Tab Take 1 tablet (20 mg total) by mouth nightly. Watch muscle weakness 90 tablet 3 2024 at 2200    levothyroxine 100 MCG Oral Tab Take 1 tablet (100 mcg total) by mouth before breakfast. 90 tablet 1 9/10/2024 at 0530    Na Sulfate-K Sulfate-Mg Sulf (SUPREP BOWEL PREP KIT) 17.5-3.13-1.6 GM/177ML Oral Solution Take as directed 1 each 0       Current Facility-Administered Medications   Medication Dose Route Frequency    lactated ringers infusion   Intravenous Continuous       Allergies: No Known Allergies    Past Medical History:    Arrhythmia    Cholecystitis    Disorder of thyroid    Epidermal inclusion cyst    excision of cyst on back, 2013    High blood pressure    High cholesterol    Osteoarthritis    Other and unspecified hyperlipidemia    Prostate enlargement    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    Unspecified essential hypertension     Past Surgical History:   Procedure Laterality Date    Cholecystectomy      Colonoscopy  2007    Colonoscopy N/A 1/2/2018    Procedure: COLONOSCOPY;  Surgeon: Shiv Cruz MD;  Location: [facility] ENDOSCOPY    Colonoscopy N/A 2/16/2021    Procedure: COLONOSCOPY;  Surgeon: Shiv Cruz MD;  Location: [facility] ENDOSCOPY    Kidney surgery  2012    left partial nephrectomy    Knee replacement surgery Left 2005    Right TKR 10/2015    Total knee replacement       Family History   Problem Relation Age of Onset    Cancer Mother     Breast Cancer Mother     Alcohol and Other Disorders Associated Father         alcoholism     Social History     Tobacco Use    Smoking status: Never     Passive exposure: Never    Smokeless tobacco: Never   Substance Use Topics    Alcohol use: Yes     Alcohol/week: 4.0 standard drinks of alcohol     Types: 4 Glasses of wine per week       SYSTEM Check if Review is Normal Check if Physical Exam is Normal If not normal, please explain:   HEENT [ ] [X ]    NECK & BACK [ ] [ ]    HEART [ X ] [ X ]    LUNGS [ X ] [ X ]    ABDOMEN [ X ] [ X ]    UROGENITAL [ ] [ ]    EXTREMITIES [ ] [ ]    OTHER        See Anesthesia documentation    [ x ] I have discussed the risks and benefits and alternatives with the patient/family.  They understand and agree to proceed with plan of care.  [ x ] I have reviewed the History and Physical done within the last 30 days.  Any changes  noted above.    Shiv Cruz MD  9/10/2024  7:48 AM

## 2024-09-10 NOTE — OPERATIVE REPORT
[facility]    COLONOSCOPY PROCEDURE REPORT     DATE OF PROCEDURE:  9/10/2024     PCP: Wilbur Martin MD     PREOPERATIVE DIAGNOSIS:  History sessile serrated adenoma colon polyps     POSTOPERATIVE DIAGNOSIS:  See impression.     SURGEON:  Shiv Cruz M.D.     SEDATION:    MAC anesthesia provided by the Anesthesia Service.  MAC anesthesia requested due to anticipated intolerance of colonoscopy examination under safe doses conscious sedation medications    COLONOSCOPY PROCEDURE:   After the nature and risks of colonoscopy examination under MAC anesthesia were discussed with the patient and all questions answered, informed consent was obtained.  The patient was sedated as above.      Digital rectal exam was performed which showed no masses.  The Olympus pediatric video colonoscope was placed in the patient's rectum and advanced under direct visualization through the entire length of the colon up to the cecum and terminal ileum.  Retroflex exam performed up the ascending colon to the hepatic flexure.  The cecum was confirmed by landmarks including appendiceal orifice, cecal trifold, ileocecal valve.  Retroflexion was performed in the rectum.    The quality of the prep was excellent.    Estimated blood loss: none/insignificant       COLONOSCOPY FINDINGS:    2 sessile colon polyps 5 and 7 mm size found and removed from the mid and distal transverse colon by cold snare polypectomy, suctioned out.  3 sessile colon polyps 5-6 mm in size removed from the mid and distal sigmoid colon by cold snare polypectomy technique, suctioned out.  Tattoo and polypectomy scar identified in the mid transverse colon with no recurrence.  Medium sized internal hemorrhoids.    RECOMMENDATIONS:  High fiber diet.  Follow-up above colon polyp pathology results.  Repeat colonoscopy examination in 5 years.  No aspirin or NSAID medications for next 5 days to prevent bleeding

## 2024-09-10 NOTE — ANESTHESIA POSTPROCEDURE EVALUATION
Patient: Stanley De Santiago    Procedure Summary       Date: 09/10/24 Room / Location: [facility] ENDOSCOPY 05 / [facility] ENDOSCOPY    Anesthesia Start: 0747 Anesthesia Stop:     Procedure: COLONOSCOPY Diagnosis:       H/O adenomatous polyp of colon      (colon polyps,hemorrhoids)    Surgeons: Shiv Cruz MD Anesthesiologist: Bev Germain CRNA    Anesthesia Type: general, MAC ASA Status: 2            Anesthesia Type: general, MAC    Vitals Value Taken Time   /65 09/10/24 0826   Temp  09/10/24 0827   Pulse 72 09/10/24 0826   Resp 16 09/10/24 0826   SpO2 96 % 09/10/24 0826       [facility] AN Post Evaluation:   Patient Evaluated in Patient location: [facility] 30.  Patient Participation: complete - patient participated  Level of Consciousness: awake  Pain Score: 0  Pain Management: adequate  Airway Patency:patent  Dental exam unchanged from preop  Yes    Nausea/Vomiting: none  Cardiovascular Status: stable  Respiratory Status: nasal cannula  Postoperative Hydration stable      Bev Germain CRNA  9/10/2024 8:27 AM

## 2024-09-10 NOTE — ANESTHESIA PREPROCEDURE EVALUATION
Anesthesia PreOp Note    HPI:     Stanley De Santiago is a 73 year old male who presents for preoperative consultation requested by: Shiv Cruz MD    Date of Surgery: 9/10/2024    Procedure(s):  COLONOSCOPY  Indication: H/O adenomatous polyp of colon    Relevant Problems   No relevant active problems       NPO:  Last Liquid Consumption Date: 09/10/24  Last Liquid Consumption Time: 0530  Last Solid Consumption Date: 09/09/24  Last Solid Consumption Time: 0800  Last Liquid Consumption Date: 09/10/24          History Review:  Patient Active Problem List    Diagnosis Date Noted    Hypokalemia 05/25/2023    Azotemia 05/25/2023    Hyperglycemia 05/25/2023    Atrial fibrillation and flutter (HCC) 05/25/2023    New onset atrial flutter (HCC) 04/24/2023    Atrial flutter with rapid ventricular response (HCC) 04/24/2023    Diverticulosis 08/17/2022    Left knee pain 01/29/2020    Primary osteoarthritis of right knee 10/22/2015    Osteoarthrosis, unspecified whether generalized or localized, lower leg 05/22/2015    Cholelithiasis 05/16/2013    Benign prostatic hyperplasia without lower urinary tract symptoms 11/03/2012    Renal cell carcinoma (HCC) 04/05/2012    Hyperlipidemia 02/10/2012    Essential hypertension 10/30/2010    Pain in joint, pelvic region and thigh 10/30/2010    Benign neoplasm of skin 04/24/2010    Pain in joint, lower leg 08/13/2009    Contusion 02/21/2009    Nodular prostate with urinary obstruction 02/07/2008    Osteoarthrosis 06/08/2006    Calculus of kidney 01/06/2006    Hematuria 10/03/2005    Primary localized osteoarthrosis, lower leg 04/25/2005    Arthropathy, lower leg 03/21/2005       Past Medical History:    Arrhythmia    Cholecystitis    Disorder of thyroid    Epidermal inclusion cyst    excision of cyst on back, 2013    High blood pressure    High cholesterol    Osteoarthritis    Other and unspecified hyperlipidemia    Prostate enlargement    Renal cell carcinoma (HCC)    clear  cell left    Unspecified essential hypertension       Past Surgical History:   Procedure Laterality Date    Cholecystectomy      Colonoscopy      Colonoscopy N/A 2018    Procedure: COLONOSCOPY;  Surgeon: Shiv Cruz MD;  Location: [facility] ENDOSCOPY    Colonoscopy N/A 2021    Procedure: COLONOSCOPY;  Surgeon: Shiv Cruz MD;  Location: [facility] ENDOSCOPY    Kidney surgery  2012    left partial nephrectomy    Knee replacement surgery Left 2005    Right TKR 10/2015    Total knee replacement         Medications Prior to Admission   Medication Sig Dispense Refill Last Dose    [] cephALEXin (KEFLEX) 500 MG Oral Cap Take 1 capsule (500 mg total) by mouth 2 (two) times daily for 7 days. 14 capsule 0     ibuprofen 600 MG Oral Tab Take 1 tablet (600 mg total) by mouth every 8 (eight) hours as needed for Pain or Fever. 30 tablet 0     oxybutynin ER 10 MG Oral Tablet 24 Hr Take 1 tablet (10 mg total) by mouth daily. 90 tablet 3 9/10/2024 at 0530    amLODIPine 10 MG Oral Tab Take 1 tablet (10 mg total) by mouth daily. 90 tablet 3 9/10/2024 at 0530    metoprolol succinate ER 25 MG Oral Tablet 24 Hr Take 1 tablet (25 mg total) by mouth daily.   9/10/2024 at 0530    amiodarone 200 MG Oral Tab amiodarone 200 mg tablet, [RxNorm: 799472]   9/10/2024 at 0530    Multiple Vitamin (MULTIVITAMIN ADULT OR) Take by mouth.   2024 at 0530    [] traMADol 50 MG Oral Tab Take 1-2 tablets ( mg total) by mouth every 6 (six) hours as needed. 14 tablet 0     atorvastatin 20 MG Oral Tab Take 1 tablet (20 mg total) by mouth nightly. Watch muscle weakness 90 tablet 3 2024 at 2200    levothyroxine 100 MCG Oral Tab Take 1 tablet (100 mcg total) by mouth before breakfast. 90 tablet 1 9/10/2024 at 0530    Na Sulfate-K Sulfate-Mg Sulf (SUPREP BOWEL PREP KIT) 17.5-3.13-1.6 GM/177ML Oral Solution Take as directed 1 each 0      Current Facility-Administered Medications Ordered in Epic   Medication Dose  Route Frequency Provider Last Rate Last Admin    lactated ringers infusion   Intravenous Continuous Shiv Cruz MD         No current [facility]-ordered outpatient medications on file.       No Known Allergies    Family History   Problem Relation Age of Onset    Cancer Mother     Breast Cancer Mother     Alcohol and Other Disorders Associated Father         alcoholism     Social History     Socioeconomic History    Marital status:    Tobacco Use    Smoking status: Never     Passive exposure: Never    Smokeless tobacco: Never   Vaping Use    Vaping status: Never Used   Substance and Sexual Activity    Alcohol use: Yes     Alcohol/week: 4.0 standard drinks of alcohol     Types: 4 Glasses of wine per week    Drug use: No    Sexual activity: Not Currently     Partners: Female   Other Topics Concern    Caffeine Concern Yes     Comment: coffee 2cups/day       Available pre-op labs reviewed.  Lab Results   Component Value Date    WBC 7.2 08/30/2024    RBC 4.84 08/30/2024    HGB 14.5 08/30/2024    HCT 40.8 08/30/2024    MCV 84.3 08/30/2024    MCH 30.0 08/30/2024    MCHC 35.5 08/30/2024    RDW 14.2 08/30/2024    .0 08/30/2024     Lab Results   Component Value Date     08/30/2024    K 3.2 (L) 08/30/2024     08/30/2024    CO2 26.0 08/30/2024    BUN 30 (H) 08/30/2024    CREATSERUM 1.88 (H) 08/30/2024     (H) 08/30/2024    CA 9.8 08/30/2024          Vital Signs:  Body mass index is 34.57 kg/m².   height is 1.854 m (6' 1\") and weight is 118.8 kg (262 lb). His blood pressure is 134/76 and his pulse is 65. His respiration is 23 and oxygen saturation is 97%.   Vitals:    09/03/24 1311 09/10/24 0657   BP:  134/76   Pulse:  65   Resp:  23   SpO2:  97%   Weight: 118.8 kg (262 lb) 118.8 kg (262 lb)   Height: 1.854 m (6' 1\") 1.854 m (6' 1\")        Anesthesia Evaluation     Patient summary reviewed and Nursing notes reviewed    Airway   Mallampati: III  TM distance: >3 FB  Neck ROM: full  Dental     (+) partials    Pulmonary - normal exam   Cardiovascular - normal exam  Exercise tolerance: good  (+) hypertension well controlled    ROS comment: Ablation for A flutter 2023. Now NSR    Neuro/Psych      GI/Hepatic/Renal    (+) bowel prep    Endo/Other    Abdominal  - normal exam                 Anesthesia Plan:   ASA:  2  Plan:   General and MAC  Informed Consent Plan and Risks Discussed With:  Patient  Discussed plan with:  CRNA and surgeon      I have informed Stanley De Santiago and/or legal guardian or family member of the nature of the anesthetic plan, benefits, risks including possible dental damage if relevant, major complications, and any alternative forms of anesthetic management.   All of the patient's questions were answered to the best of my ability. The patient desires the anesthetic management as planned.  Bev Germain CRNA  9/10/2024 7:16 AM  Present on Admission:  **None**

## 2024-09-10 NOTE — DISCHARGE INSTRUCTIONS
Home Care Instructions for Colonoscopy with Sedation    Diet:  - Resume your regular diet as tolerated unless otherwise instructed.  - Start with light meals to minimize bloating.  - Do not drink alcohol today.    Medication:  - If you have questions about resuming your normal medications, please contact your Primary Care Physician.    Activities:  - Take it easy today. Do not return to work today.  - Do not drive today.  - Do not operate any machinery today (including kitchen equipment).    Colonoscopy:  - You may notice some rectal \"spotting\" (a little blood on the toilet tissue) for a day or two after the exam. This is normal.  - If you experience any rectal bleeding (not spotting), persistent tenderness or sharp severe abdominal pains, oral temperature over 100 degrees Fahrenheit, light-headedness or dizziness, or any other problems, contact your doctor.    **If unable to reach your doctor, please go to the [facility] Emergency Room**    - Your referring physician will receive a full report of your examination.  - If you do not hear from your doctor's office within two weeks of your biopsy, please call them for your results.    You may be able to see your laboratory results in [facility] between 4 and 7 business days.  In some cases, your physician may not have viewed the results before they are released to [facility].  If you have questions regarding your results contact the physician who ordered the test/exam by phone or via [facility] by choosing \"Ask a Medical Question.\"      .  .  .  Notes from Dr Cruz:  The large polyp that I removed back in February 2021 has not grown back.  Great news.  5 small to medium sized benign colon polyps were found and removed today - to be sent to the lab to be examined - a letter will be sent with results.  You may see small quantities of blood with your next 1-2 bowel movements today.    If you check your results on [facility], the \"pathology\" report on the colon  polyp(s) should be released within the next 24-48 hours.  In general, a \"hyperplastic\" colon polyp is completely benign, vs an \"adenoma\" or \"sessile serrated adenoma\" which is considered a benign but precancerous colon polyp.  That will be explained in a letter/email from me as well.  No aspirin, Excedrin, Advil/Motrin/ibuprofen, Aleve/naproxen for next 10 days (to prevent bleeding.)  Internal hemorrhoids - very common  If you are raw and irritated back there after all of that diarrhea and wiping, three good options to soothe and heal the irritation include Aquaphor ointment, \"Desitin\" or \"A & D\" diaper ointment, or good old-fashioned Vaseline.  All of these soothe and create a protective barrier so that your skin can heal.  I recommend repeat colonoscopy exam in 5 years.  .  .  .

## 2024-09-12 ENCOUNTER — LAB ENCOUNTER (OUTPATIENT)
Dept: LAB | Facility: HOSPITAL | Age: 74
End: 2024-09-12
Attending: INTERNAL MEDICINE
Payer: MEDICARE

## 2024-09-12 DIAGNOSIS — M47.816 LUMBAR SPONDYLOSIS: ICD-10-CM

## 2024-09-12 DIAGNOSIS — E03.9 HYPOTHYROIDISM, UNSPECIFIED TYPE: ICD-10-CM

## 2024-09-12 DIAGNOSIS — N18.30 STAGE 3 CHRONIC KIDNEY DISEASE, UNSPECIFIED WHETHER STAGE 3A OR 3B CKD (HCC): ICD-10-CM

## 2024-09-12 DIAGNOSIS — Z51.89 ENCOUNTER FOR MEDICATION ADJUSTMENT: ICD-10-CM

## 2024-09-12 LAB
ANION GAP SERPL CALC-SCNC: 6 MMOL/L (ref 0–18)
BILIRUB UR QL: NEGATIVE
BUN BLD-MCNC: 16 MG/DL (ref 9–23)
BUN/CREAT SERPL: 11.9 (ref 10–20)
CALCIUM BLD-MCNC: 9.2 MG/DL (ref 8.7–10.4)
CHLORIDE SERPL-SCNC: 109 MMOL/L (ref 98–112)
CLARITY UR: CLEAR
CO2 SERPL-SCNC: 30 MMOL/L (ref 21–32)
CREAT BLD-MCNC: 1.34 MG/DL
EGFRCR SERPLBLD CKD-EPI 2021: 55 ML/MIN/1.73M2 (ref 60–?)
FASTING STATUS PATIENT QL REPORTED: NO
GLUCOSE BLD-MCNC: 109 MG/DL (ref 70–99)
GLUCOSE UR-MCNC: NORMAL MG/DL
HGB UR QL STRIP.AUTO: NEGATIVE
KETONES UR-MCNC: NEGATIVE MG/DL
LEUKOCYTE ESTERASE UR QL STRIP.AUTO: 25
NITRITE UR QL STRIP.AUTO: NEGATIVE
OSMOLALITY SERPL CALC.SUM OF ELEC: 302 MOSM/KG (ref 275–295)
PH UR: 7 [PH] (ref 5–8)
POTASSIUM SERPL-SCNC: 3.1 MMOL/L (ref 3.5–5.1)
PROT UR-MCNC: NEGATIVE MG/DL
SODIUM SERPL-SCNC: 145 MMOL/L (ref 136–145)
SP GR UR STRIP: 1.01 (ref 1–1.03)
TSI SER-ACNC: 1.66 MIU/ML (ref 0.55–4.78)
UROBILINOGEN UR STRIP-ACNC: NORMAL

## 2024-09-12 PROCEDURE — 87086 URINE CULTURE/COLONY COUNT: CPT | Performed by: INTERNAL MEDICINE

## 2024-09-12 PROCEDURE — 80048 BASIC METABOLIC PNL TOTAL CA: CPT

## 2024-09-12 PROCEDURE — 84443 ASSAY THYROID STIM HORMONE: CPT

## 2024-09-12 PROCEDURE — 81001 URINALYSIS AUTO W/SCOPE: CPT | Performed by: INTERNAL MEDICINE

## 2024-09-12 PROCEDURE — 36415 COLL VENOUS BLD VENIPUNCTURE: CPT

## 2024-09-13 ENCOUNTER — NURSE TRIAGE (OUTPATIENT)
Dept: INTERNAL MEDICINE CLINIC | Facility: CLINIC | Age: 74
End: 2024-09-13

## 2024-09-13 NOTE — TELEPHONE ENCOUNTER
Verified name and date of birth. The patient was called and informed with understanding.       Dr. Martin please advise on when the patient can be seen?

## 2024-09-13 NOTE — TELEPHONE ENCOUNTER
Action Requested: Summary for Provider     []  Critical Lab, Recommendations Needed  [] Need Additional Advice  []   FYI    []   Need Orders  [] Need Medications Sent to Pharmacy  []  Other     SUMMARY: Please advise.  The patient has 2 issues:    1)   He had a colonoscopy on Tuesday and has not had a bowel movement since.  He asking what can he take to have a bowel movement?  He continues to have the sometimes upper and sometimes lower abdominal pain. It  is now a 2/10.  Per the patient this is why he had the colonoscopy.   Per the patient he felt great the day after the colonoscopy.   Per the patient whenever he has a colonoscopy he becomes constipated.    2)  He would like to discuss his lab results with you personally.   First available appointment is 9/30/24.  The patient is asking for an earlier appointment.   Please advise on a date/time.    Reason for call: No chief complaint on file.  Onset: Data Unavailable      General Assessment (questions to ask the caller)  Do you consider this a medical emergency?: No  Can you access 911?: Yes  Do you have any pain?: Yes  What is the severity of your pain? (Scale 1-10): 2  Do you have a fever?: No  What is the date of your last medical exam?: 09/06/24  Additional Assessments  Are these symptoms new, recurrent, or chronic?: new (since his Colonoscopy on Tuesday)                  The kidney function has improved and is now back to baseline.  This is good news.     Potassium level is still little low.  Try to increase your intake of potassium rich foods.  We should recheck this in a month or 2.     Please contact the office if you wish to discuss this issue directly with me, either over the phone or face-to-face in the office.     Dr Diaz Martin MD   Written by Wilbur Martin MD on 9/12/2024 10:22 PM CDT  Seen by patient Stanley De Santiago on 9/13/2024  8:30 AM  Reason for Disposition   MILD constipation    Additional Information   Commented on: Vomiting and abdomen  looks much more swollen than usual     unknown    Protocols used: Constipation-A-OH

## 2024-09-16 PROBLEM — K57.90 DIVERTICULOSIS: Status: RESOLVED | Noted: 2022-08-17 | Resolved: 2024-09-16

## 2024-09-16 PROBLEM — R73.9 HYPERGLYCEMIA: Status: RESOLVED | Noted: 2023-05-25 | Resolved: 2024-09-16

## 2024-09-16 PROBLEM — R79.89 AZOTEMIA: Status: RESOLVED | Noted: 2023-05-25 | Resolved: 2024-09-16

## 2024-09-16 PROBLEM — M25.562 LEFT KNEE PAIN: Status: RESOLVED | Noted: 2020-01-29 | Resolved: 2024-09-16

## 2024-09-16 PROBLEM — E87.6 HYPOKALEMIA: Status: RESOLVED | Noted: 2023-05-25 | Resolved: 2024-09-16

## 2024-09-16 NOTE — PROGRESS NOTES
HPI:    Patient ID: Stanley De Santiago is a 73 year old male.    HPI  ER f/u abdominal pain - still experiencing pain right side low abdomen mostly when laying down and trying to get up    CT ABDOMEN+PELVIS(CPT=74176) 8/25  CONCLUSION:      No acute abnormality in the abdomen or pelvis.     No finding to explain abdominal or flank pain.  No renal or ureteral stones or hydronephrosis.  The appendix is unremarkable.  No bowel obstruction.  No biliary ductal dilatation.  No CT evidence of acute pancreatitis.     Prostatomegaly.     Stable calcified lesion adjacent to the left partial nephrectomy site, probably fat necrosis.  Fluid attenuation cyst are probably benign but incompletely characterized on this noncontrast exam.  Recommend nonemergent outpatient evaluation with CT   urogram.           Dictated by (CST): Jeffrey Lemos MD on 8/25/2024 at 11:13 AM       Finalized by (CST): Jeffrey Lemos MD on 8/25/2024 at 11:19 AM     /74 (BP Location: Right arm, Patient Position: Sitting, Cuff Size: large)   Pulse 59   Ht 6' 1\" (1.854 m)   Wt 263 lb (119.3 kg)   BMI 34.70 kg/m²   Wt Readings from Last 6 Encounters:   09/10/24 262 lb (118.8 kg)   09/06/24 263 lb (119.3 kg)   08/27/24 263 lb (119.3 kg)   08/25/24 260 lb (117.9 kg)   04/26/24 266 lb (120.7 kg)   11/29/23 263 lb (119.3 kg)     Body mass index is 34.7 kg/m².  HGBA1C:    Lab Results   Component Value Date    A1C 5.7 (H) 08/22/2023     08/22/2023         Review of Systems   Constitutional:  Negative for chills, fatigue and fever.   HENT:  Negative for congestion and sinus pressure.    Respiratory:  Negative for cough, chest tightness, shortness of breath and wheezing.    Cardiovascular:  Negative for chest pain, palpitations and leg swelling.   Gastrointestinal:  Positive for abdominal pain. Negative for constipation, diarrhea, nausea and vomiting.   Genitourinary:  Negative for dysuria and hematuria.   Musculoskeletal:  Positive for arthralgias  and back pain.   Skin:  Negative for rash and wound.   Neurological:  Negative for dizziness, syncope, light-headedness and headaches.   Psychiatric/Behavioral:  Negative for dysphoric mood. The patient is not nervous/anxious.          Current Outpatient Medications   Medication Sig Dispense Refill    atorvastatin 20 MG Oral Tab Take 1 tablet (20 mg total) by mouth nightly. Watch muscle weakness 90 tablet 3    oxybutynin ER 10 MG Oral Tablet 24 Hr Take 1 tablet (10 mg total) by mouth daily. 90 tablet 3    amLODIPine 10 MG Oral Tab Take 1 tablet (10 mg total) by mouth daily. 90 tablet 3    metoprolol succinate ER 25 MG Oral Tablet 24 Hr Take 1 tablet (25 mg total) by mouth daily.      levothyroxine 100 MCG Oral Tab Take 1 tablet (100 mcg total) by mouth before breakfast. 90 tablet 1    amiodarone 200 MG Oral Tab amiodarone 200 mg tablet, [RxNorm: 082955]      Multiple Vitamin (MULTIVITAMIN ADULT OR) Take by mouth.      ibuprofen 600 MG Oral Tab Take 1 tablet (600 mg total) by mouth every 8 (eight) hours as needed for Pain or Fever. 30 tablet 0    Na Sulfate-K Sulfate-Mg Sulf (SUPREP BOWEL PREP KIT) 17.5-3.13-1.6 GM/177ML Oral Solution Take as directed 1 each 0     Allergies:No Known Allergies    HISTORY:  Past Medical History:    Arrhythmia    Cholecystitis    Disorder of thyroid    Epidermal inclusion cyst    excision of cyst on back, 2013    High blood pressure    High cholesterol    Osteoarthritis    Other and unspecified hyperlipidemia    Prostate enlargement    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    Unspecified essential hypertension      Past Surgical History:   Procedure Laterality Date    Cholecystectomy      Colonoscopy  2007    Colonoscopy N/A 1/2/2018    Procedure: COLONOSCOPY;  Surgeon: Shiv Cruz MD;  Location: [facility] ENDOSCOPY    Colonoscopy N/A 2/16/2021    Procedure: COLONOSCOPY;  Surgeon: Shiv Cruz MD;  Location: [facility] ENDOSCOPY    Colonoscopy N/A 9/10/2024     Procedure: COLONOSCOPY;  Surgeon: Shiv Cruz MD;  Location: [facility] ENDOSCOPY    Kidney surgery  2012    left partial nephrectomy    Knee replacement surgery Left 2005    Right TKR 10/2015    Total knee replacement        Family History   Problem Relation Age of Onset    Cancer Mother     Breast Cancer Mother     Alcohol and Other Disorders Associated Father         alcoholism      Social History:   Social History     Socioeconomic History    Marital status:    Tobacco Use    Smoking status: Never     Passive exposure: Never    Smokeless tobacco: Never   Vaping Use    Vaping status: Never Used   Substance and Sexual Activity    Alcohol use: Yes     Alcohol/week: 4.0 standard drinks of alcohol     Types: 4 Glasses of wine per week    Drug use: No    Sexual activity: Not Currently     Partners: Female   Other Topics Concern    Caffeine Concern Yes     Comment: coffee 2cups/day     Social Determinants of Health     Financial Resource Strain: Low Risk  (5/30/2023)    Financial Resource Strain     Difficulty of Paying Living Expenses: Not very hard     Med Affordability: No   Transportation Needs: No Transportation Needs (5/30/2023)    Transportation Needs     Lack of Transportation: No        PHYSICAL EXAM:    Physical Exam  Constitutional:       Appearance: He is well-developed. He is not ill-appearing.   HENT:      Right Ear: Ear canal normal.      Left Ear: Ear canal normal.      Mouth/Throat:      Pharynx: Oropharynx is clear.   Eyes:      Extraocular Movements: Extraocular movements intact.      Conjunctiva/sclera: Conjunctivae normal.      Pupils: Pupils are equal, round, and reactive to light.   Cardiovascular:      Rate and Rhythm: Normal rate and regular rhythm.      Heart sounds: Normal heart sounds.   Pulmonary:      Effort: Pulmonary effort is normal.      Breath sounds: Normal breath sounds.   Abdominal:      General: Bowel sounds are normal.      Palpations: Abdomen is soft.       Tenderness: There is no abdominal tenderness.   Skin:     General: Skin is warm and dry.   Neurological:      Mental Status: He is alert.      Deep Tendon Reflexes: Reflexes are normal and symmetric.   Psychiatric:         Mood and Affect: Mood normal.              ASSESSMENT/PLAN:   (M47.816) Lumbar spondylosis  (primary encounter diagnosis)  Plan: XR LUMBAR SPINE (MIN 2 VIEWS) (CPT=72100), MRI         SPINE LUMBAR (CPT=72148), Physical Therapy         Referral - [place] Locations        Abdominal pain  more likely referred pain  Referral and PT ordered    (Z87.81) History of compression fracture of spine  Plan: XR LUMBAR SPINE (MIN 2 VIEWS) (CPT=72100), MRI         SPINE LUMBAR (CPT=72148), Physical Therapy         Referral - [place] Locations        Xray ordered    (M54.50) Acute bilateral low back pain without sciatica  Plan: XR LUMBAR SPINE (MIN 2 VIEWS) (CPT=72100), MRI         SPINE LUMBAR (CPT=72148), Physical Therapy         Referral - [place] Locations        MRI ordered    Patient voiced understanding  and agrees with plan        Meds This Visit:  Requested Prescriptions      No prescriptions requested or ordered in this encounter       Imaging & Referrals:  PHYSICAL THERAPY - INTERNAL  MRI SPINE LUMBAR (CPT=72148)        ID#[id number]

## 2024-09-23 ENCOUNTER — TELEPHONE (OUTPATIENT)
Facility: CLINIC | Age: 74
End: 2024-09-23

## 2024-09-23 NOTE — TELEPHONE ENCOUNTER
Recall colonoscopy in 5 years per Dr Cruz.    Colon done 9/10/2024    Health maintenance updated and message sent to patient outreach to repeat colonoscopy in 5 years    Results seen by patient via mychart  Seen by patient Stanley De Santiago on 9/22/2024 10:55 AM

## 2024-09-23 NOTE — TELEPHONE ENCOUNTER
----- Message from Shiv Cruz sent at 9/22/2024 10:54 AM CDT -----  GI RNs - 1. Please Recall for colonoscopy exam in 5 years

## 2024-09-24 NOTE — TELEPHONE ENCOUNTER
Dr. Martin   Please advise were  the labs addressed?    2)  He would like to discuss his lab results with you personally.   First available appointment is 9/30/24.  The patient is asking for an earlier appointment.   Please advise on a date/time.

## 2024-09-30 ENCOUNTER — HOSPITAL ENCOUNTER (OUTPATIENT)
Dept: MRI IMAGING | Age: 74
Discharge: HOME | End: 2024-09-30
Attending: INTERNAL MEDICINE
Payer: MEDICARE

## 2024-09-30 DIAGNOSIS — M54.50 ACUTE BILATERAL LOW BACK PAIN WITHOUT SCIATICA: ICD-10-CM

## 2024-09-30 DIAGNOSIS — Z87.81 HISTORY OF COMPRESSION FRACTURE OF SPINE: ICD-10-CM

## 2024-09-30 DIAGNOSIS — M47.816 LUMBAR SPONDYLOSIS: ICD-10-CM

## 2024-09-30 PROCEDURE — 72148 MRI LUMBAR SPINE W/O DYE: CPT | Performed by: INTERNAL MEDICINE

## 2024-09-30 NOTE — TELEPHONE ENCOUNTER
I left a message on patient's voicemail.  Patient had MRI done today.  Blood work done last week or so.  I will try and contact him tomorrow.

## 2024-10-15 RX ORDER — LEVOTHYROXINE SODIUM 100 UG/1
100 TABLET ORAL
Qty: 90 TABLET | Refills: 3 | Status: SHIPPED | OUTPATIENT
Start: 2024-10-15

## 2024-10-15 NOTE — TELEPHONE ENCOUNTER
Refill Per Protocol     Requested Prescriptions   Pending Prescriptions Disp Refills    LEVOTHYROXINE 100 MCG Oral Tab [Pharmacy Med Name: LEVOTHYROXINE 0.100MG (100MCG) TAB] 90 tablet 1     Sig: TAKE 1 TABLET(100 MCG) BY MOUTH BEFORE BREAKFAST       Thyroid Medication Protocol Passed - 10/15/2024  2:12 PM        Passed - TSH in past 12 months        Passed - Last TSH value is normal     Lab Results   Component Value Date    TSH 1.659 09/12/2024                 Passed - In person appointment or virtual visit in the past 12 mos or appointment in next 3 mos     Recent Outpatient Visits              1 month ago Suprapubic pain    [facility] Wilbur Martin MD    Office Visit    1 month ago Lumbar spondylosis    [facility] Mina Jaeger MD    Office Visit    5 months ago BPH with obstruction/lower urinary tract symptoms    [facility] Hossein Pace MD    Office Visit    5 months ago Hypothyroidism, unspecified type    [facility]bur Martin MD    Office Visit    10 months ago Hypothyroidism, unspecified type    [facility] Wilbur Martin MD    Office Visit          Future Appointments         Provider Department Appt Notes    In 2 weeks Chula Vee DPM [facility], [address], [place] fungus and [place]    In 2 months Wilbur Martin MD [facility] ankle swelling    In 6 months Hossein Pace MD [facility] 1 year                           Future Appointments         Provider Department Appt Notes    In 2 weeks Chula Vee DPM [facility], [address], [place]    In 2 months Wilbur Martin MD [facility]  [facility] ankle swelling    In 6 months Hossein Pace MD [facility] 1 year          Recent Outpatient Visits              [facility] month [facility]    [facility]    1 month ago Lumbar spondylosis    [facility] Mina Jaeger MD    Office Visit    5 months ago BPH with obstruction/lower urinary tract symptoms    [facility] Hossein Pace MD    Office Visit    5 months ago Hypothyroidism, unspecified type    [facility] Wilbur Ambrosio MD    Office Visit    10 months ago Hypothyroidism, unspecified type    [facility]    Office Visit

## 2024-10-30 ENCOUNTER — MED REC SCAN ONLY (OUTPATIENT)
Dept: ORTHOPEDICS CLINIC | Facility: CLINIC | Age: 74
End: 2024-10-30

## 2024-10-30 ENCOUNTER — OFFICE VISIT (OUTPATIENT)
Dept: PODIATRY CLINIC | Facility: CLINIC | Age: 74
End: 2024-10-30
Payer: MEDICARE

## 2024-10-30 DIAGNOSIS — B35.1 ONYCHOMYCOSIS: Primary | ICD-10-CM

## 2024-10-30 DIAGNOSIS — M20.40 HAMMER TOE, ACQUIRED: ICD-10-CM

## 2024-10-30 PROCEDURE — 99214 OFFICE O/P EST MOD 30 MIN: CPT | Performed by: PODIATRIST

## 2024-10-30 NOTE — PROGRESS NOTES
Reason for Visit      Stanley De Santiago is a 73 year old male presents today complaining of bilateral foot evaluation.     History of Present Illness     Patient presents to clinic today for follow-up management of bilateral lower extremity onychomycosis and callus.  Patient was last seen by podiatry in 2022 for management of possible to plantar verruca on his left foot.  Patient presents to clinic today with multiple issues in regards to bilateral lower extremities.  Patient tried oral Lamisil many years ago for a few months and noted to some improvement but not a significant mount.  Patient also has concerns with curling of his digits as well as pain to the second digits bilateral.    The following portions of the patient's history were reviewed and updated as appropriate: allergies, current medications, past family history, past medical history, past social history, past surgical history and problem list.    Allergies[1]      Current Outpatient Medications:     levothyroxine 100 MCG Oral Tab, Take 1 tablet (100 mcg total) by mouth before breakfast., Disp: 90 tablet, Rfl: 3    ibuprofen 600 MG Oral Tab, Take 1 tablet (600 mg total) by mouth every 8 (eight) hours as needed for Pain or Fever., Disp: 30 tablet, Rfl: 0    atorvastatin 20 MG Oral Tab, Take 1 tablet (20 mg total) by mouth nightly. Watch muscle weakness, Disp: 90 tablet, Rfl: 3    oxybutynin ER 10 MG Oral Tablet 24 Hr, Take 1 tablet (10 mg total) by mouth daily., Disp: 90 tablet, Rfl: 3    amLODIPine 10 MG Oral Tab, Take 1 tablet (10 mg total) by mouth daily., Disp: 90 tablet, Rfl: 3    metoprolol succinate ER 25 MG Oral Tablet 24 Hr, Take 1 tablet (25 mg total) by mouth daily., Disp: , Rfl:     Na Sulfate-K Sulfate-Mg Sulf (SUPREP BOWEL PREP KIT) 17.5-3.13-1.6 GM/177ML Oral Solution, Take as directed, Disp: 1 each, Rfl: 0    amiodarone 200 MG Oral Tab, amiodarone 200 mg tablet, [RxNorm: 503475], Disp: , Rfl:     Multiple Vitamin (MULTIVITAMIN ADULT  OR), Take by mouth., Disp: , Rfl:     There are no discontinued medications.    Patient Active Problem List   Diagnosis    Benign prostatic hyperplasia without lower urinary tract symptoms    Essential hypertension    Primary osteoarthritis of right knee    New onset atrial flutter (HCC)    Atrial flutter with rapid ventricular response (HCC)    Calculus of kidney    Cholelithiasis    Renal cell carcinoma (HCC)    Atrial fibrillation and flutter (HCC)    Nodular prostate with urinary obstruction    Pain in joint, pelvic region and thigh       Past Medical History:    Arrhythmia    Cholecystitis    Disorder of thyroid    Epidermal inclusion cyst    excision of cyst on back, 2013    High blood pressure    High cholesterol    Osteoarthritis    Other and unspecified hyperlipidemia    Prostate enlargement    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    Unspecified essential hypertension       Past Surgical History:   Procedure Laterality Date    Cholecystectomy      Colonoscopy  2007    Colonoscopy N/A 1/2/2018    Procedure: COLONOSCOPY;  Surgeon: Shiv Cruz MD;  Location: [facility] ENDOSCOPY    Colonoscopy N/A 2/16/2021    Procedure: COLONOSCOPY;  Surgeon: Shiv Cruz MD;  Location: [facility] ENDOSCOPY    Colonoscopy N/A 9/10/2024    Procedure: COLONOSCOPY;  Surgeon: hSiv Cruz MD;  Location: [facility] ENDOSCOPY    Kidney surgery  2012    left partial nephrectomy    Knee replacement surgery Left 2005    Right TKR 10/2015    Total knee replacement         Family History   Problem Relation Age of Onset    Cancer Mother     Breast Cancer Mother     Alcohol and Other Disorders Associated Father         alcoholism       Social History     Occupational History    Not on file   Tobacco Use    Smoking status: Never     Passive exposure: Never    Smokeless tobacco: Never   Vaping Use    Vaping status: Never Used   Substance and Sexual Activity    Alcohol use: Yes     Alcohol/week: 4.0 standard  drinks of alcohol     Types: 4 Glasses of wine per week    Drug use: No    Sexual activity: Not Currently     Partners: Female       ROS      Constitutional: negative for chills, fevers and sweats  Gastrointestinal: negative for abdominal pain, diarrhea, nausea and vomiting  Genitourinary:negative for dysuria and hematuria  Musculoskeletal:negative for arthralgias and muscle weakness  Neurological: negative for paresthesia and weakness  All others reviewed and negative.      Physical Exam     LE PHYSICAL EXAM    Constitution: Well-developed and well-nourished. Gait appears normal. No apparent distress. Alert and oriented to person, place, and time.  Integument: There are no varicosities. Skin appears moist, warm, and supple with positive hair growth. There are no color changes. No open lesions. No macerations, No Hyperkeratotic lesions.  Vascular examination: Dorsalis pedis and posterior tibial pulses are strong bilaterally with capillary filling time less than 3 seconds to all digits. There is no peripheral edema..  Neurological Sensorium: Grossly intact to sharp/dull. Vibratory: Intact.  Musculoskeletal:   5/5 pedal muscle strength b/l       Contracted digits 2 through 5 bilateral with thickened nails 1 through 5 bilateral.  No open lesions signs of infection noted.  Hyperkeratotic lesion dorsal lateral aspect of left fifth digit    Assessment and Plan     Encounter Diagnoses   Name Primary?    Onychomycosis Yes    Hammer toe, acquired      In regards to the onychomycosis went over both topical oral laser removal and compounding.  Patient opted for oral.  Reviewed last blood work which BUN/creatinine had resolved but he has a history of elevation.  Placed an order for hepatic function panel to be done prior to prescribing oral medication which patient is interested in    -In regards to the hammertoe surgery discussed in great detail and gave information about toe crest pads.  Discussed hammertoe surgery in great  detail.  Patient states that he was in part of injury of a hammertoe surgery that got bad.  Discussed risk and benefits with hammertoe surgery.  Patient was instructed to call the office or on-call podiatric physician immediately with any issues or concerns before the next scheduled visit. Patient to follow-up in clinic in after blood work      Chula Hicks DPM, [email or web address] ROSE  Diplomat, American Board of Foot and Ankle Surgery  Certified in Foot and Rearfoot/Ankle Reconstruction  Fellow of the American College of Foot and Ankle Surgeons  Fellowship Trained Foot and Ankle Surgeon   [facility]     10/30/2024    5:49 AM         [1] No Known Allergies

## 2024-11-04 ENCOUNTER — LAB ENCOUNTER (OUTPATIENT)
Dept: LAB | Facility: HOSPITAL | Age: 74
End: 2024-11-04
Attending: INTERNAL MEDICINE
Payer: MEDICARE

## 2024-11-04 DIAGNOSIS — Z79.899 NEED FOR PROPHYLACTIC CHEMOTHERAPY: ICD-10-CM

## 2024-11-04 DIAGNOSIS — I48.92 ATRIAL FLUTTER WITH RAPID VENTRICULAR RESPONSE (HCC): Primary | ICD-10-CM

## 2024-11-04 DIAGNOSIS — I48.92 NEW ONSET ATRIAL FLUTTER (HCC): ICD-10-CM

## 2024-11-04 DIAGNOSIS — B35.1 ONYCHOMYCOSIS: ICD-10-CM

## 2024-11-04 LAB
ALBUMIN SERPL-MCNC: 4.3 G/DL (ref 3.2–4.8)
ALBUMIN/GLOB SERPL: 1.5 {RATIO} (ref 1–2)
ALP LIVER SERPL-CCNC: 94 U/L
ALT SERPL-CCNC: 29 U/L
ANION GAP SERPL CALC-SCNC: 7 MMOL/L (ref 0–18)
AST SERPL-CCNC: 27 U/L (ref ?–34)
BILIRUB DIRECT SERPL-MCNC: 0.2 MG/DL (ref ?–0.3)
BILIRUB SERPL-MCNC: 0.5 MG/DL (ref 0.2–1.1)
BUN BLD-MCNC: 32 MG/DL (ref 9–23)
BUN/CREAT SERPL: 17.7 (ref 10–20)
CALCIUM BLD-MCNC: 9.8 MG/DL (ref 8.7–10.4)
CHLORIDE SERPL-SCNC: 110 MMOL/L (ref 98–112)
CO2 SERPL-SCNC: 28 MMOL/L (ref 21–32)
CREAT BLD-MCNC: 1.81 MG/DL
EGFRCR SERPLBLD CKD-EPI 2021: 39 ML/MIN/1.73M2 (ref 60–?)
FASTING STATUS PATIENT QL REPORTED: NO
GLOBULIN PLAS-MCNC: 2.9 G/DL (ref 2–3.5)
GLUCOSE BLD-MCNC: 106 MG/DL (ref 70–99)
OSMOLALITY SERPL CALC.SUM OF ELEC: 307 MOSM/KG (ref 275–295)
POTASSIUM SERPL-SCNC: 3.9 MMOL/L (ref 3.5–5.1)
PROT SERPL-MCNC: 7.2 G/DL (ref 5.7–8.2)
SODIUM SERPL-SCNC: 145 MMOL/L (ref 136–145)
T4 FREE SERPL-MCNC: 1.5 NG/DL (ref 0.8–1.7)
TSI SER-ACNC: 3.31 UIU/ML (ref 0.55–4.78)

## 2024-11-04 PROCEDURE — 84443 ASSAY THYROID STIM HORMONE: CPT

## 2024-11-04 PROCEDURE — 84439 ASSAY OF FREE THYROXINE: CPT

## 2024-11-04 PROCEDURE — 82248 BILIRUBIN DIRECT: CPT

## 2024-11-04 PROCEDURE — 36415 COLL VENOUS BLD VENIPUNCTURE: CPT

## 2024-11-04 PROCEDURE — 80053 COMPREHEN METABOLIC PANEL: CPT

## 2024-11-06 NOTE — TELEPHONE ENCOUNTER
Spoke with patient. Verified ID, gave results of hepatic panel and he would like to proceed with the oral treatment. Pharmacy confirmed - [facility] in [place].

## 2024-11-06 NOTE — TELEPHONE ENCOUNTER
----- Message from Chula Vee sent at 11/5/2024  8:37 AM CST -----  Please call patient and inform him that his blood work is within normal limits.  If he would like to prescribe an oral medication please let me know.

## 2024-11-07 RX ORDER — TERBINAFINE HYDROCHLORIDE 250 MG/1
250 TABLET ORAL DAILY
Qty: 90 TABLET | Refills: 0 | Status: SHIPPED | OUTPATIENT
Start: 2024-11-07

## 2024-12-31 ENCOUNTER — TELEPHONE (OUTPATIENT)
Dept: INTERNAL MEDICINE CLINIC | Facility: CLINIC | Age: 74
End: 2024-12-31

## 2024-12-31 NOTE — TELEPHONE ENCOUNTER
COMPREHENSIVE MEDICATION REVIEW         Stanley De Santiago MRN [id number]    1950 PCP Wilbur Martin MD     Comments: Medication history completed by Ambulatory Clinic Pharmacist over the phone on 24. Patient has upcoming AWV with PCP on 1/3/25.     After thorough medication review, 2 discrepancies have been identified and corrected on patient's medication list. See updated list below:     Outpatient Encounter Medications as of 2024   Medication Sig    terbinafine 250 MG Oral Tab Take 1 tablet (250 mg total) by mouth daily.    levothyroxine 100 MCG Oral Tab Take 1 tablet (100 mcg total) by mouth before breakfast.    atorvastatin 20 MG Oral Tab Take 1 tablet (20 mg total) by mouth nightly. Watch muscle weakness    oxybutynin ER 10 MG Oral Tablet 24 Hr Take 1 tablet (10 mg total) by mouth daily.    amLODIPine 10 MG Oral Tab Take 1 tablet (10 mg total) by mouth daily.    metoprolol succinate ER 25 MG Oral Tablet 24 Hr Take 1 tablet (25 mg total) by mouth daily.    amiodarone 200 MG Oral Tab Take 1 tablet (200 mg total) by mouth daily.    Multiple Vitamin (MULTIVITAMIN ADULT OR) Take 1 tablet by mouth daily.     Medication Assessment:   Reviewed all medications in detail with patient including dose, indication, timing of administration, monitoring parameters, and potential side effects of medications.     Patient reports taking amiodarone 200 mg daily, amlodipine 10 mg daily and metoprolol succinate ER 25 mg daily as prescribed. He does not usually monitor his blood pressure at home. Did recommend he monitor his blood pressure 2-3 times weekly and bring readings in to all MD appointments for review.     Did provide education and stressed the importance of taking medication just like prescribed to get the most benefit. Patient denies forgetting or missing medication doses and denies any questions or concerns with medications at this time.     Thank you,    Sisi Freeman, PharmD, 2024, 2:41  PM

## 2025-01-02 ENCOUNTER — LAB ENCOUNTER (OUTPATIENT)
Dept: LAB | Facility: HOSPITAL | Age: 75
End: 2025-01-02
Attending: INTERNAL MEDICINE
Payer: MEDICARE

## 2025-01-02 PROCEDURE — 80048 BASIC METABOLIC PNL TOTAL CA: CPT | Performed by: INTERNAL MEDICINE

## 2025-01-02 PROCEDURE — 36415 COLL VENOUS BLD VENIPUNCTURE: CPT | Performed by: INTERNAL MEDICINE

## 2025-01-02 PROCEDURE — 85025 COMPLETE CBC W/AUTO DIFF WBC: CPT | Performed by: INTERNAL MEDICINE

## 2025-01-02 PROCEDURE — 80061 LIPID PANEL: CPT | Performed by: INTERNAL MEDICINE

## 2025-01-03 ENCOUNTER — OFFICE VISIT (OUTPATIENT)
Dept: INTERNAL MEDICINE CLINIC | Facility: CLINIC | Age: 75
End: 2025-01-03
Payer: MEDICARE

## 2025-01-03 VITALS
TEMPERATURE: 97 F | WEIGHT: 263.63 LBS | BODY MASS INDEX: 34.94 KG/M2 | RESPIRATION RATE: 18 BRPM | HEART RATE: 54 BPM | DIASTOLIC BLOOD PRESSURE: 78 MMHG | OXYGEN SATURATION: 97 % | SYSTOLIC BLOOD PRESSURE: 128 MMHG | HEIGHT: 73 IN

## 2025-01-03 DIAGNOSIS — E03.9 HYPOTHYROIDISM, UNSPECIFIED TYPE: ICD-10-CM

## 2025-01-03 DIAGNOSIS — N18.31 STAGE 3A CHRONIC KIDNEY DISEASE (HCC): ICD-10-CM

## 2025-01-03 DIAGNOSIS — R10.2 SUPRAPUBIC PAIN: ICD-10-CM

## 2025-01-03 DIAGNOSIS — I10 ESSENTIAL HYPERTENSION: ICD-10-CM

## 2025-01-03 DIAGNOSIS — R60.0 LOWER LEG EDEMA: ICD-10-CM

## 2025-01-03 DIAGNOSIS — Z86.79 HISTORY OF ATRIAL FLUTTER: ICD-10-CM

## 2025-01-03 DIAGNOSIS — E78.5 HYPERLIPIDEMIA, UNSPECIFIED HYPERLIPIDEMIA TYPE: ICD-10-CM

## 2025-01-03 DIAGNOSIS — Z85.528 HISTORY OF RENAL CELL CARCINOMA: ICD-10-CM

## 2025-01-03 DIAGNOSIS — Z00.00 ENCOUNTER FOR ANNUAL HEALTH EXAMINATION: Primary | ICD-10-CM

## 2025-01-03 DIAGNOSIS — M47.816 LUMBAR SPONDYLOSIS: ICD-10-CM

## 2025-01-03 PROBLEM — I48.92 ATRIAL FLUTTER WITH RAPID VENTRICULAR RESPONSE (HCC): Status: RESOLVED | Noted: 2023-04-24 | Resolved: 2025-01-03

## 2025-01-03 PROBLEM — I48.91 ATRIAL FIBRILLATION AND FLUTTER (HCC): Status: RESOLVED | Noted: 2023-05-25 | Resolved: 2025-01-03

## 2025-01-03 PROBLEM — I48.92 NEW ONSET ATRIAL FLUTTER (HCC): Status: RESOLVED | Noted: 2023-04-24 | Resolved: 2025-01-03

## 2025-01-03 PROBLEM — I48.92 ATRIAL FIBRILLATION AND FLUTTER (HCC): Status: RESOLVED | Noted: 2023-05-25 | Resolved: 2025-01-03

## 2025-01-03 NOTE — PROGRESS NOTES
Subjective:   Stanley De Santiago is a 74 year old male who presents for a Medicare Subsequent Annual Wellness visit (Pt already had Initial Annual Wellness) and scheduled follow up of multiple significant but stable problems.     Patient is here requesting Medicare annual wellness visit and follow-up on chronic conditions as listed below.  I last saw him in the office on September 6.  At that time was complaining of some intermittent suprapubic pain.  Imaging studies had been unremarkable.  Since that time he saw podiatry and cardiology.  Current medications reviewed.  Health maintenance and vaccination status reviewed.  Advanced directives reviewed.    Patient had blood work done yesterday.  Basic metabolic profile showed creatinine of 1.46 with GFR of 50.  This was stable.  Normal CBC, PSA, lipid profile.    Patient states that the suprapubic pain has essentially resolved.  Went away about 2 weeks after his last appointment.  It can occur occasionally but nothing like what it was.  He wonders whether it is associated with constipation.  He now uses MiraLAX about once a week to help with the constipation.    Otherwise he feels okay.  Feels a little tired right now in the moment.  He does have arthritis and knee pain which is worse with increased in activities.  He is planning on traveling to [place] in the spring.  Has some chronic swelling in the leg, left side greater than right.  Back sometimes gets sore.  Takes Aleve as needed about 3 or 4 times a month.    He feels his diet has improved.  He is going to exercise classes 2 or 3 times a week with modified Cross[employer].  Weight is unchanged.  No tobacco.  Alcohol is cut back down to 3 or 4 glasses of wine per week.  He has advanced directives in place.    History/Other:   Fall Risk Assessment:   He has been screened for Falls and is low risk.      Cognitive Assessment:   He had a completely normal cognitive assessment - see flowsheet entries     Functional  Ability/Status:   Stanley De Santiago has a completely normal functional assessment. See flowsheet for details.        Depression Screening (PHQ):  PHQ-2 SCORE: 0  , done 1/3/2025   If you checked off any problems, how difficult have these problems made it for you to do your work, take care of things at home, or get along with other people?: Not difficult at all              Advanced Directives:   He does NOT have a Living Will. [Do you have a living will?: (Patient-Rptd) No]  He has a Power of  for Health Care on file in [facility].  Discussed Advance Care Planning with patient (and family/surrogate if present). Standard forms made available to patient in After Visit Summary.      Patient Active Problem List   Diagnosis    Benign prostatic hyperplasia without lower urinary tract symptoms    Essential hypertension    Primary osteoarthritis of right knee    Calculus of kidney    Cholelithiasis    Nodular prostate with urinary obstruction    Pain in joint, pelvic region and thigh    History of renal cell carcinoma    History of atrial flutter     Allergies:  He has No Known Allergies.    Current Medications:  Outpatient Medications Marked as Taking for the 1/3/25 encounter (Office Visit) with Wilbur Martin MD   Medication Sig    terbinafine 250 MG Oral Tab Take 1 tablet (250 mg total) by mouth daily.    levothyroxine 100 MCG Oral Tab Take 1 tablet (100 mcg total) by mouth before breakfast.    atorvastatin 20 MG Oral Tab Take 1 tablet (20 mg total) by mouth nightly. Watch muscle weakness    oxybutynin ER 10 MG Oral Tablet 24 Hr Take 1 tablet (10 mg total) by mouth daily.    amLODIPine 10 MG Oral Tab Take 1 tablet (10 mg total) by mouth daily.    metoprolol succinate ER 25 MG Oral Tablet 24 Hr Take 1 tablet (25 mg total) by mouth daily.    amiodarone 200 MG Oral Tab Take 1 tablet (200 mg total) by mouth daily.    Multiple Vitamin (MULTIVITAMIN ADULT OR) Take 1 tablet by mouth daily.       Medical History:  He  has  a past medical history of Arrhythmia, Cholecystitis, Disorder of thyroid, Epidermal inclusion cyst, High blood pressure, High cholesterol, Osteoarthritis, Other and unspecified hyperlipidemia, Prostate enlargement, Renal cell carcinoma (HCC) (2014), and Unspecified essential hypertension.  Surgical History:  He  has a past surgical history that includes cholecystectomy; Kidney Surgery (2012); knee replacement surgery (Left, 2005); colonoscopy (2007); colonoscopy (N/A, 1/2/2018); total knee replacement; colonoscopy (N/A, 2/16/2021); and colonoscopy (N/A, 9/10/2024).   Family History:  His family history includes Alcohol and Other Disorders Associated in his father; Breast Cancer in his mother; Cancer in his mother.  Social History:  He  reports that he has never smoked. He has never been exposed to tobacco smoke. He has never used smokeless tobacco. He reports current alcohol use of about 4.0 standard drinks of alcohol per week. He reports that he does not use drugs.    Tobacco:  He has never smoked tobacco.    CAGE Alcohol Screen:   CAGE screening score of 0 on 12/27/2024, showing low risk of alcohol abuse.      Patient Care Team:  Wilbur Martin MD as PCP - General (Internal Medicine)  Patt Portillo, PT, DPT, Cert.MDT as Physical Therapist (Physical Therapy)  Leanna Evans PTA as Physical Therapist (Physical Therapy)    Review of Systems       Objective:   Physical Exam  Constitutional:       Appearance: Normal appearance. He is well-developed.   HENT:      Right Ear: Tympanic membrane and ear canal normal.      Left Ear: Tympanic membrane and ear canal normal.      Nose: Nose normal.      Mouth/Throat:      Pharynx: No oropharyngeal exudate or posterior oropharyngeal erythema.   Eyes:      Conjunctiva/sclera: Conjunctivae normal.      Pupils: Pupils are equal, round, and reactive to light.   Neck:      Thyroid: No thyromegaly.      Vascular: No carotid bruit.   Cardiovascular:      Rate and Rhythm: Normal rate  and regular rhythm.      Pulses: Normal pulses.      Heart sounds: Normal heart sounds. No murmur heard.  Pulmonary:      Effort: Pulmonary effort is normal.      Breath sounds: Normal breath sounds. No wheezing or rales.   Abdominal:      General: Bowel sounds are normal.      Palpations: Abdomen is soft. There is no mass.      Tenderness: There is no abdominal tenderness.   Genitourinary:     Penis: Normal.       Testes: Normal.   Musculoskeletal:      Right lower leg: No edema.      Left lower leg: No edema.   Lymphadenopathy:      Cervical: No cervical adenopathy.   Skin:     General: Skin is warm and dry.      Findings: No rash.   Neurological:      General: No focal deficit present.      Mental Status: He is alert.      Cranial Nerves: No cranial nerve deficit.      Coordination: Coordination normal.   Psychiatric:         Mood and Affect: Mood normal.         Behavior: Behavior normal.         Thought Content: Thought content normal.         Judgment: Judgment normal.          /78 (BP Location: Left arm, Patient Position: Sitting, Cuff Size: large)   Pulse 54   Temp 97.4 °F (36.3 °C) (Other)   Resp 18   Ht 6' 1\" (1.854 m)   Wt 263 lb 9.6 oz (119.6 kg)   SpO2 97%   BMI 34.78 kg/m²  Estimated body mass index is 34.78 kg/m² as calculated from the following:    Height as of this encounter: 6' 1\" (1.854 m).    Weight as of this encounter: 263 lb 9.6 oz (119.6 kg).    Medicare Hearing Assessment:   Hearing Screening    Screening Method: Questionnaire  I have a problem hearing over the telephone: No I have trouble following the conversations when two or more people are talking at the same time: No   I have trouble understanding things on the TV: No I have to strain to understand conversations: No   I have to worry about missing the telephone ring or doorbell: No I have trouble hearing conversations in a noisy background such as a crowded room or restaurant: No   I get confused about where sounds come  from: No I misunderstand some words in a sentence and need to ask people to repeat themselves: No   I especially have trouble understanding the speech of women and children: No I have trouble understanding the speaker in a large room such as at a meeting or place of Faith: No   Many people I talk to seem to mumble (or don't speak clearly): No People get annoyed because I misunderstand what they say: No   I misunderstand what others are saying and make inappropriate responses: No I avoid social activities because I cannot hear well and fear I will reply improperly: No   Family members and friends have told me they think I may have hearing loss: No                   Assessment & Plan:   Stanley De Santiago is a 74 year old male who presents for a Medicare Assessment.     1. Encounter for annual health examination  Physical exam is relatively unremarkable.  Active issues as below.  Health maintenance issues reviewed.  Advanced directives reviewed.  Encouraged healthy diet and regular exercise.  Also encouraged adequate sleep and stress management.  Follow-up in about 6 months.    2. Essential hypertension  Well-controlled.  Continue current treatment.    3. Hypothyroidism, unspecified type  Stable.  Continue current dose of thyroid replacement.    4. Hyperlipidemia, unspecified hyperlipidemia type  Recent lipid profile look good.  Blood test results were reviewed today.  Continue current treatment.    5. Stage 3a chronic kidney disease (HCC)  Most recent GFR of 50.  Appears reasonably stable.  Encouraged adequate hydration.    6. Lumbar spondylosis  Doing reasonably well.  Still with some chronic pain.  No change in treatment or further testing right now.    7. History of renal cell carcinoma  Event occurred about 10 or 20 years ago.  No evidence of recurrence.    8. History of atrial flutter  No evidence of active atrial fibrillation or a flutter.  Continue with amiodarone and metoprolol.    9. Lower leg  edema  Encouraged low-salt diet and keeping legs elevated.  Wrongly advised to get medium strength knee-high compression stockings and wear them regularly.  On his trip to [place], he should definitely be wearing them there and back on the plane.    10. Suprapubic pain  Largely resolved.  No clear etiology.  May have been related to constipation.  No need for any further testing right now.    The patient indicates understanding of these issues and agrees to the plan.  Reinforced healthy diet, lifestyle, and exercise.      No follow-ups on file.     Wilbur Martin MD, 1/3/2025     Supplementary Documentation:   General Health:  In the past six months, have you lost more than 10 pounds without trying?: (Patient-Rptd) 2 - No  Has your appetite been poor?: (Patient-Rptd) No  Type of Diet: (Patient-Rptd) Balanced  How does the patient maintain a good energy level?: (Patient-Rptd) Appropriate Exercise;Daily Walks  How would you describe your daily physical activity?: (Patient-Rptd) Moderate  How would you describe your current health state?: (Patient-Rptd) Fair  How do you maintain positive mental well-being?: (Patient-Rptd) Social Interaction;Puzzles;Visiting Friends  On a scale of 0 to 10, with 0 being no pain and 10 being severe pain, what is your pain level?: (Patient-Rptd) 3 - (Mild)  In the past six months, have you experienced urine leakage?: (Patient-Rptd) 0-No  At any time do you feel concerned for the safety/well-being of yourself and/or your children, in your home or elsewhere?: (Patient-Rptd) No  Have you had any immunizations at another office such as Influenza, Hepatitis B, Tetanus, or Pneumococcal?: (Patient-Rptd) No    Health Maintenance   Topic Date Due    Annual Physical  08/23/2024    Fall Risk Screening (Annual)  01/01/2025    Colorectal Cancer Screening  09/10/2029    Influenza Vaccine  Completed    Annual Depression Screening  Completed    Pneumococcal Vaccine: 65+ Years  Completed    Zoster  Vaccines  Completed    COVID-19 Vaccine  Completed

## 2025-02-08 ENCOUNTER — LAB ENCOUNTER (OUTPATIENT)
Dept: LAB | Facility: HOSPITAL | Age: 75
End: 2025-02-08
Attending: PODIATRIST
Payer: MEDICARE

## 2025-02-08 DIAGNOSIS — B35.1 ONYCHOMYCOSIS: ICD-10-CM

## 2025-02-08 LAB
ALBUMIN SERPL-MCNC: 4.3 G/DL (ref 3.2–4.8)
ALP LIVER SERPL-CCNC: 92 U/L
ALT SERPL-CCNC: 24 U/L
AST SERPL-CCNC: 26 U/L (ref ?–34)
BILIRUB DIRECT SERPL-MCNC: 0.2 MG/DL (ref ?–0.3)
BILIRUB SERPL-MCNC: 0.6 MG/DL (ref 0.2–1.1)
PROT SERPL-MCNC: 6.9 G/DL (ref 5.7–8.2)

## 2025-02-08 PROCEDURE — 36415 COLL VENOUS BLD VENIPUNCTURE: CPT

## 2025-02-08 PROCEDURE — 80076 HEPATIC FUNCTION PANEL: CPT

## 2025-02-11 ENCOUNTER — OFFICE VISIT (OUTPATIENT)
Dept: PODIATRY CLINIC | Facility: CLINIC | Age: 75
End: 2025-02-11
Payer: MEDICARE

## 2025-02-11 DIAGNOSIS — M20.40 HAMMER TOE, ACQUIRED: ICD-10-CM

## 2025-02-11 DIAGNOSIS — B35.1 ONYCHOMYCOSIS: Primary | ICD-10-CM

## 2025-02-11 PROCEDURE — 99213 OFFICE O/P EST LOW 20 MIN: CPT | Performed by: PODIATRIST

## 2025-02-11 RX ORDER — TERBINAFINE HYDROCHLORIDE 250 MG/1
250 TABLET ORAL DAILY
Qty: 90 TABLET | Refills: 0 | Status: SHIPPED | OUTPATIENT
Start: 2025-02-11

## 2025-02-11 NOTE — PROGRESS NOTES
Reason for Visit      Stanley De Santiago is a 74 year old male presents today complaining of bilateral foot evaluation.     History of Present Illness     Patient presents to clinic today for follow-up management of bilateral lower extremity onychomycosis and callus.  Patient was last seen by podiatry in 2022 for management of possible to plantar verruca on his left foot.  Patient presents to clinic today with multiple issues in regards to bilateral lower extremities.  Patient tried oral Lamisil many years ago for a few months and noted to some improvement but not a significant mount.  Patient also has concerns with curling of his digits as well as pain to the second digits bilateral.    Patient returns to clinic today for 3-month follow-up of bilateral second digits.  Patient states that he has had no side effects with the medication and noticed improvement in the second digits.    The following portions of the patient's history were reviewed and updated as appropriate: allergies, current medications, past family history, past medical history, past social history, past surgical history and problem list.    Allergies[1]      Current Outpatient Medications:     terbinafine 250 MG Oral Tab, Take 1 tablet (250 mg total) by mouth daily., Disp: 90 tablet, Rfl: 0    levothyroxine 100 MCG Oral Tab, Take 1 tablet (100 mcg total) by mouth before breakfast., Disp: 90 tablet, Rfl: 3    atorvastatin 20 MG Oral Tab, Take 1 tablet (20 mg total) by mouth nightly. Watch muscle weakness, Disp: 90 tablet, Rfl: 3    oxybutynin ER 10 MG Oral Tablet 24 Hr, Take 1 tablet (10 mg total) by mouth daily., Disp: 90 tablet, Rfl: 3    amLODIPine 10 MG Oral Tab, Take 1 tablet (10 mg total) by mouth daily., Disp: 90 tablet, Rfl: 3    metoprolol succinate ER 25 MG Oral Tablet 24 Hr, Take 1 tablet (25 mg total) by mouth daily., Disp: , Rfl:     amiodarone 200 MG Oral Tab, Take 1 tablet (200 mg total) by mouth daily., Disp: , Rfl:     Multiple  Vitamin (MULTIVITAMIN ADULT OR), Take 1 tablet by mouth daily., Disp: , Rfl:     There are no discontinued medications.    Patient Active Problem List   Diagnosis    Benign prostatic hyperplasia without lower urinary tract symptoms    Essential hypertension    Primary osteoarthritis of right knee    Calculus of kidney    Cholelithiasis    Nodular prostate with urinary obstruction    Pain in joint, pelvic region and thigh    History of renal cell carcinoma    History of atrial flutter       Past Medical History:    Arrhythmia    Cholecystitis    Disorder of thyroid    Epidermal inclusion cyst    excision of cyst on back, 2013    High blood pressure    High cholesterol    Osteoarthritis    Other and unspecified hyperlipidemia    Prostate enlargement    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    Unspecified essential hypertension       Past Surgical History:   Procedure Laterality Date    Cholecystectomy      Colonoscopy  2007    Colonoscopy N/A 1/2/2018    Procedure: COLONOSCOPY;  Surgeon: Shiv Cruz MD;  Location: [facility]    Colonoscopy N/A 2/16/2021    Procedure: COLONOSCOPY;  Surgeon: Shiv Cruz MD;  Location: [facility] ENDOSCOPY    Colonoscopy N/A 9/10/2024    Procedure: COLONOSCOPY;  Surgeon: Shiv Cruz MD;  Location: [facility] ENDOSCOPY    Kidney surgery  2012    left partial nephrectomy    Knee replacement surgery Left 2005    Right TKR 10/2015    Total knee replacement         Family History   Problem Relation Age of Onset    Cancer Mother     Breast Cancer Mother     Alcohol and Other Disorders Associated Father         alcoholism       Social History     Occupational History    Not on file   Tobacco Use    Smoking status: Never     Passive exposure: Never    Smokeless tobacco: Never   Vaping Use    Vaping status: Never Used   Substance and Sexual Activity    Alcohol use: Yes     Alcohol/week: 4.0 standard drinks of alcohol     Types: 4 Glasses of wine per week     Drug use: No    Sexual activity: Not Currently     Partners: Female       ROS      Constitutional: negative for chills, fevers and sweats  Gastrointestinal: negative for abdominal pain, diarrhea, nausea and vomiting  Genitourinary:negative for dysuria and hematuria  Musculoskeletal:negative for arthralgias and muscle weakness  Neurological: negative for paresthesia and weakness  All others reviewed and negative.      Physical Exam     LE PHYSICAL EXAM    Constitution: Well-developed and well-nourished. Gait appears normal. No apparent distress. Alert and oriented to person, place, and time.  Integument: There are no varicosities. Skin appears moist, warm, and supple with positive hair growth. There are no color changes. No open lesions. No macerations, No Hyperkeratotic lesions.  Vascular examination: Dorsalis pedis and posterior tibial pulses are strong bilaterally with capillary filling time less than 3 seconds to all digits. There is no peripheral edema..  Neurological Sensorium: Grossly intact to sharp/dull. Vibratory: Intact.  Musculoskeletal:   5/5 pedal muscle strength b/l       Contracted digits 2 through 5 bilateral with thickened nails 1 through 5 bilateral.  No open lesions signs of infection noted.  Hyperkeratotic lesion dorsal lateral aspect of left fifth digit    Assessment and Plan     No diagnosis found.    In regards to the onychomycosis went over both topical oral laser removal and compounding.  Patient opted for oral.  Reviewed last blood work which BUN/creatinine had resolved but he has a history of elevation.  Placed an order for hepatic function panel to be done prior to prescribing oral medication which patient is interested in    -In regards to the hammertoe surgery discussed in great detail and gave information about toe crest pads.  Discussed hammertoe surgery in great detail.  Patient states that he was in part of injury of a hammertoe surgery that got bad.  Discussed risk and benefits  with [facility] surgery.  Patient was instructed to call the office or on-call podiatric physician immediately with any issues or concerns before the next scheduled visit. Patient to follow-up in clinic in after blood work patient has noted improvement in the second digits with a 3 months of oral medication.  Patient's blood work is within normal limits and patient has no side effects.  Prescribed for another 3 months.  Once again discussed side effects and to please stop medication immediately and to call us with any issues      Chula Hicks DPM, D.MAIK ROSE  Diplomat, American Board of Foot and Ankle Surgery  Certified in Foot and Rearfoot/Ankle Reconstruction  Fellow of the American College of Foot and Ankle Surgeons  Fellowship Trained Foot and Ankle Surgeon   [facility]     10/30/2024    5:49 AM         [1] No Known Allergies

## 2025-02-13 ENCOUNTER — TELEPHONE (OUTPATIENT)
Dept: ORTHOPEDICS CLINIC | Facility: CLINIC | Age: 75
End: 2025-02-13

## 2025-02-13 NOTE — TELEPHONE ENCOUNTER
Mary Bolden RN  2/13/2025  9:01 AM CST Back to Top      Left message to call back on voicemail.    Chula Vee DPM  2/10/2025  7:01 AM CST       Please call patient and inform him that his liver function panel was within normal limits.  If he is interested in oral medication for treatment of his toenail fungus please let us know

## 2025-03-23 ENCOUNTER — PATIENT MESSAGE (OUTPATIENT)
Dept: INTERNAL MEDICINE CLINIC | Facility: CLINIC | Age: 75
End: 2025-03-23

## 2025-04-07 ENCOUNTER — OFFICE VISIT (OUTPATIENT)
Dept: INTERNAL MEDICINE CLINIC | Facility: CLINIC | Age: 75
End: 2025-04-07
Payer: MEDICARE

## 2025-04-07 VITALS
HEIGHT: 73 IN | WEIGHT: 264 LBS | BODY MASS INDEX: 34.99 KG/M2 | TEMPERATURE: 98 F | RESPIRATION RATE: 18 BRPM | DIASTOLIC BLOOD PRESSURE: 80 MMHG | SYSTOLIC BLOOD PRESSURE: 142 MMHG | HEART RATE: 54 BPM | OXYGEN SATURATION: 98 %

## 2025-04-07 DIAGNOSIS — L72.3 SEBACEOUS CYST: Primary | ICD-10-CM

## 2025-04-07 PROCEDURE — G2211 COMPLEX E/M VISIT ADD ON: HCPCS | Performed by: INTERNAL MEDICINE

## 2025-04-07 PROCEDURE — 99213 OFFICE O/P EST LOW 20 MIN: CPT | Performed by: INTERNAL MEDICINE

## 2025-04-07 RX ORDER — CEFADROXIL 500 MG/1
500 CAPSULE ORAL 2 TIMES DAILY
Qty: 10 CAPSULE | Refills: 0 | Status: SHIPPED | OUTPATIENT
Start: 2025-04-07 | End: 2025-04-12

## 2025-04-07 NOTE — PROGRESS NOTES
HPI:    Patient ID: Stanley De Santiago is a 74 year old male.    HPI  Patient is here with concerns about a possible boil or cyst on his back.  We just saw him in January for Medicare annual wellness visit.  He was doing well at that time.  No changes made.    Long standing cyst on back is getting bigger recently; it is more tender when he leans back. No fever. No drainage.       Patient Active Problem List   Diagnosis    Benign prostatic hyperplasia without lower urinary tract symptoms    Essential hypertension    Primary osteoarthritis of right knee    Calculus of kidney    Cholelithiasis    Nodular prostate with urinary obstruction    Pain in joint, pelvic region and thigh    History of renal cell carcinoma    History of atrial flutter          HISTORY:  Past Medical History:    Arrhythmia    Cholecystitis    Disorder of thyroid    Epidermal inclusion cyst    excision of cyst on back, 2013    High blood pressure    High cholesterol    Osteoarthritis    Other and unspecified hyperlipidemia    Prostate enlargement    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    Unspecified essential hypertension      Past Surgical History:   Procedure Laterality Date    Cholecystectomy      Colonoscopy  2007    Colonoscopy N/A 1/2/2018    Procedure: COLONOSCOPY;  Surgeon: Shiv Cruz MD;  Location: [facility] ENDOSCOPY    Colonoscopy N/A 2/16/2021    Procedure: COLONOSCOPY;  Surgeon: Shiv Cruz MD;  Location: [facility] ENDOSCOPY    Colonoscopy N/A 9/10/2024    Procedure: COLONOSCOPY;  Surgeon: Shiv Cruz MD;  Location: [facility] ENDOSCOPY    Kidney surgery  2012    left partial nephrectomy    Knee replacement surgery Left 2005    Right TKR 10/2015    Total knee replacement        Family History   Problem Relation Age of Onset    Cancer Mother     Breast Cancer Mother     Alcohol and Other Disorders Associated Father         alcoholism      Social History     Socioeconomic History    Marital status:     Tobacco Use    Smoking status: Never     Passive exposure: Never    Smokeless tobacco: Never   Vaping Use    Vaping status: Never Used   Substance and Sexual Activity    Alcohol use: Yes     Alcohol/week: 4.0 standard drinks of alcohol     Types: 4 Glasses of wine per week    Drug use: No    Sexual activity: Not Currently     Partners: Female   Other Topics Concern    Caffeine Concern Yes     Comment: coffee 2cups/day     Social Drivers of Health     Transportation Needs: No Transportation Needs (5/30/2023)    Transportation Needs     Lack of Transportation: No          Review of Systems          Current Outpatient Medications   Medication Sig Dispense Refill    terbinafine 250 MG Oral Tab Take 1 tablet (250 mg total) by mouth daily. 90 tablet 0    terbinafine 250 MG Oral Tab Take 1 tablet (250 mg total) by mouth daily. 90 tablet 0    levothyroxine 100 MCG Oral Tab Take 1 tablet (100 mcg total) by mouth before breakfast. 90 tablet 3    atorvastatin 20 MG Oral Tab Take 1 tablet (20 mg total) by mouth nightly. Watch muscle weakness 90 tablet 3    oxybutynin ER 10 MG Oral Tablet 24 Hr Take 1 tablet (10 mg total) by mouth daily. 90 tablet 3    amLODIPine 10 MG Oral Tab Take 1 tablet (10 mg total) by mouth daily. 90 tablet 3    metoprolol succinate ER 25 MG Oral Tablet 24 Hr Take 1 tablet (25 mg total) by mouth daily.      amiodarone 200 MG Oral Tab Take 1 tablet (200 mg total) by mouth daily.      Multiple Vitamin (MULTIVITAMIN ADULT OR) Take 1 tablet by mouth daily.       Allergies:Allergies[1]     PHYSICAL EXAM:   There were no vitals taken for this visit.     Physical Exam  Constitutional:       Appearance: Normal appearance.   Skin:     Comments: Right sided upper back- cystic prominence with some small area of red overlying it.  With some pressure, sebaceous material can be expressed.   Neurological:      Mental Status: He is alert.          Wt Readings from Last 6 Encounters:   01/03/25 263 lb 9.6 oz  (119.6 kg)   09/10/24 262 lb (118.8 kg)   09/06/24 263 lb (119.3 kg)   08/27/24 263 lb (119.3 kg)   08/25/24 260 lb (117.9 kg)   04/26/24 266 lb (120.7 kg)             ASSESSMENT/PLAN:   1. Sebaceous cyst  Patient with sebaceous cyst on her back.  May be slightly infected now given increase in size and irritation and slight redness.  After cleaning the area with alcohol pad, scalpel was used to make small X shaped incision.  Afterward [place] of sebaceous material was obtained.  After that area was again cleaned with alcohol swab and covered with Band-Aid.  Patient advised that he could consider heating pad to try to increase drainage.  Also placed on Duricef for 5 days.  Ultimately, he may need to see surgeon to have it fully removed.  Patient will be traveling to [place] in the near future but he will be off the antibiotics by the time he leaves.         Meds This Visit:  Requested Prescriptions      No prescriptions requested or ordered in this encounter       Imaging & Referrals:  None         Wilbur Martin MD          [1] No Known Allergies

## 2025-04-09 ENCOUNTER — TELEPHONE (OUTPATIENT)
Dept: ORTHOPEDICS CLINIC | Facility: CLINIC | Age: 75
End: 2025-04-09

## 2025-04-09 DIAGNOSIS — B35.1 ONYCHOMYCOSIS: Primary | ICD-10-CM

## 2025-04-09 NOTE — TELEPHONE ENCOUNTER
Patient rescheduled his appt and mentioned that Dr. Vee might want patient to get a blood test and to order that for patient.  Please advise.

## 2025-04-14 ENCOUNTER — TELEPHONE (OUTPATIENT)
Dept: INTERNAL MEDICINE CLINIC | Facility: CLINIC | Age: 75
End: 2025-04-14

## 2025-04-14 ENCOUNTER — PATIENT MESSAGE (OUTPATIENT)
Dept: INTERNAL MEDICINE CLINIC | Facility: CLINIC | Age: 75
End: 2025-04-14

## 2025-04-14 NOTE — TELEPHONE ENCOUNTER
Patient calling because he cyst is still draining. States that he seen dr last week, cyst was cut open and drained and he was provided antibiotics. Patient states the cyst is still draining. Would like further recommendations. Patient is leaving for [place] this Thursday.

## 2025-04-14 NOTE — TELEPHONE ENCOUNTER
Spoke with patient. He noticed the test and has some other blood tests due, so he will get them done at the same time. Has follow up in mid May. No further questions or concerns at this time.

## 2025-04-14 NOTE — TELEPHONE ENCOUNTER
Ideally we would work with the patient to get into see one of our surgeons for evaluation in the next day or 2.  After that he will be leaving for [place].  Please work with the patient and with the surgery department to try to get him in.  If we absolutely cannot get him in with any of the surgeons, then should be seen by one of the internal medicine providers at the [facility].

## 2025-04-14 NOTE — TELEPHONE ENCOUNTER
Spoke to patient, full name and date of birth verified.  Informed of message from Dr. Martin.   RN informed patient will call surgery in the morning.   Patient stated he can go in for any appointment tomorrow or Wednesday, so okay to schedule with surgery if they have an appointment.     RN informed patient we will call him back tomorrow.

## 2025-04-16 ENCOUNTER — NURSE ONLY (OUTPATIENT)
Dept: SURGERY | Facility: CLINIC | Age: 75
End: 2025-04-16
Payer: MEDICARE

## 2025-04-16 VITALS
DIASTOLIC BLOOD PRESSURE: 75 MMHG | SYSTOLIC BLOOD PRESSURE: 152 MMHG | HEART RATE: 58 BPM | WEIGHT: 260 LBS | BODY MASS INDEX: 34.46 KG/M2 | HEIGHT: 73 IN

## 2025-04-16 DIAGNOSIS — D23.5 DERMOID CYST OF SKIN OF BACK: Primary | ICD-10-CM

## 2025-04-16 PROCEDURE — 99203 OFFICE O/P NEW LOW 30 MIN: CPT

## 2025-04-16 NOTE — H&P
New Patient Visit Note       Active Problems      1. Dermoid cyst of skin of back        Chief Complaint   Chief Complaint   Patient presents with    Cyst     Pt was referred for a draining cyst on his back. Pt states when he say Dr. Martin a last week he drained it and it is still draining, he denies pain. It is getting smaller. He has completed his antibiotics, he is going to [place] tomorrow.         History of Present Illness   Gerardo is a pleasant 74 year old patient presenting for consultation of an abscess on his back. He was recommended to see general surgery by Dr. Martin. He reports it has been present for many years, only within the last few months it has become large and more tender. He had I&D performed 4/7 and was given Duricefx5 days. Patient states the tenderness, swelling and drainage have improved but that he is still able to express some drainage. He has had 2 cysts on his back before, both removed during an in office procedure. He denies taking any blood thinners.       Allergies  Stanley has no known allergies.    Past Medical / Surgical / Social / Family History    The past medical and past surgical history have been reviewed by me today.    Past Medical History[1]  Past Surgical History[2]    The family history and social history have been reviewed by me today.    Family History[3]  Social Hx on file[4]   Medications - Current[5]      Review of Systems  The Review of Systems has been reviewed by me during today.  Review of Systems   Constitutional:  Negative for chills, fatigue and fever.   Respiratory:  Negative for shortness of breath.    Cardiovascular:  Negative for chest pain and leg swelling.   Gastrointestinal:  Negative for abdominal pain, constipation, diarrhea, nausea and vomiting.   Skin:         Back cyst       Physical Findings   /75   Pulse 58   Ht 6' 1\" (1.854 m)   Wt 260 lb (117.9 kg)   BMI 34.30 kg/m²   Physical Exam  Constitutional:       General: He is not in  acute distress.     Appearance: Normal appearance. He is not ill-appearing.   HENT:      Head: Normocephalic and atraumatic.      Mouth/Throat:      Mouth: Mucous membranes are moist.      Pharynx: Oropharynx is clear.   Eyes:      Extraocular Movements: Extraocular movements intact.      Conjunctiva/sclera: Conjunctivae normal.      Pupils: Pupils are equal, round, and reactive to light.   Cardiovascular:      Rate and Rhythm: Normal rate and regular rhythm.      Pulses: Normal pulses.      Heart sounds: Normal heart sounds.   Pulmonary:      Effort: Pulmonary effort is normal.      Breath sounds: Normal breath sounds.   Abdominal:      General: Abdomen is flat. Bowel sounds are normal.      Palpations: Abdomen is soft.   Skin:     Comments: Right upper back with opening, caseous material was expressed. No erythema or swelling. Mild TTP. Wound dressed with gauze and tape.    Neurological:      Mental Status: He is alert.             Assessment   1. Dermoid cyst of skin of back          Plan   Recommend to continue local wound care with daily dressing changes and keeping wound clean and dry. Clean with warm water and soap daily. Allow wound to heal by second intention  Recommend removal of back cyst  Recommend complete wound healing prior to office removal of back cyst.   Patient to follow up after trip to schedule. The procedure, risks, benefits, and recovery were discussed. All patient questions answered. The patient expresses understanding and is agreeable.       No orders of the defined types were placed in this encounter.      Imaging & Referrals   None    Follow Up  No follow-ups on file.    [facility]         [1]   Past Medical History:   Arrhythmia    Cholecystitis    Disorder of thyroid    Epidermal inclusion cyst    excision of cyst on back, 2013    High blood pressure    High cholesterol    Osteoarthritis    Other and unspecified hyperlipidemia    Prostate enlargement    Renal cell carcinoma (HCC)     clear cell left    Unspecified essential hypertension   [2]   Past Surgical History:  Procedure Laterality Date    Cholecystectomy      Colonoscopy  2007    Colonoscopy N/A 1/2/2018    Procedure: COLONOSCOPY;  Surgeon: Shiv Cruz MD;  Location: [facility] ENDOSCOPY    Colonoscopy N/A 2/16/2021    Procedure: COLONOSCOPY;  Surgeon: Shiv Cruz MD;  Location: [facility] ENDOSCOPY    Colonoscopy N/A 9/10/2024    Procedure: COLONOSCOPY;  Surgeon: Shiv Cruz MD;  Location: [facility] ENDOSCOPY    Kidney surgery  2012    left partial nephrectomy    Knee replacement surgery Left 2005    Right TKR 10/2015    Total knee replacement     [3]   Family History  Problem Relation Age of Onset    Cancer Mother     Breast Cancer Mother     Alcohol and Other Disorders Associated Father         alcoholism   [4]   Social History  Socioeconomic History    Marital status:    Tobacco Use    Smoking status: Never     Passive exposure: Never    Smokeless tobacco: Never   Vaping Use    Vaping status: Never Used   Substance and Sexual Activity    Alcohol use: Yes     Alcohol/week: 4.0 standard drinks of alcohol     Types: 4 Glasses of wine per week    Drug use: No    Sexual activity: Not Currently     Partners: Female   Other Topics Concern    Caffeine Concern Yes     Comment: coffee 2cups/day   [5]   Current Outpatient Medications:     terbinafine 250 MG Oral Tab, Take 1 tablet (250 mg total) by mouth daily., Disp: 90 tablet, Rfl: 0    levothyroxine 100 MCG Oral Tab, Take 1 tablet (100 mcg total) by mouth before breakfast., Disp: 90 tablet, Rfl: 3    atorvastatin 20 MG Oral Tab, Take 1 tablet (20 mg total) by mouth nightly. Watch muscle weakness, Disp: 90 tablet, Rfl: 3    oxybutynin ER 10 MG Oral Tablet 24 Hr, Take 1 tablet (10 mg total) by mouth daily., Disp: 90 tablet, Rfl: 3    amLODIPine 10 MG Oral Tab, Take 1 tablet (10 mg total) by mouth daily., Disp: 90 tablet, Rfl: 3    metoprolol succinate ER 25 MG  Oral Tablet 24 Hr, Take 1 tablet (25 mg total) by mouth daily., Disp: , Rfl:     amiodarone 200 MG Oral Tab, Take 1 tablet (200 mg total) by mouth daily., Disp: , Rfl:     Multiple Vitamin (MULTIVITAMIN ADULT OR), Take 1 tablet by mouth daily., Disp: , Rfl:

## 2025-05-03 ENCOUNTER — HOSPITAL ENCOUNTER (OUTPATIENT)
Age: 75
Discharge: HOME | End: 2025-05-03
Attending: STUDENT IN AN ORGANIZED HEALTH CARE EDUCATION/TRAINING PROGRAM
Payer: MEDICARE

## 2025-05-03 ENCOUNTER — OFFICE VISIT (OUTPATIENT)
Dept: FAMILY MEDICINE CLINIC | Facility: CLINIC | Age: 75
End: 2025-05-03
Payer: MEDICARE

## 2025-05-03 ENCOUNTER — APPOINTMENT (OUTPATIENT)
Dept: CT IMAGING | Age: 75
End: 2025-05-03
Attending: STUDENT IN AN ORGANIZED HEALTH CARE EDUCATION/TRAINING PROGRAM
Payer: MEDICARE

## 2025-05-03 ENCOUNTER — APPOINTMENT (OUTPATIENT)
Dept: GENERAL RADIOLOGY | Age: 75
End: 2025-05-03
Attending: STUDENT IN AN ORGANIZED HEALTH CARE EDUCATION/TRAINING PROGRAM
Payer: MEDICARE

## 2025-05-03 VITALS
DIASTOLIC BLOOD PRESSURE: 83 MMHG | HEART RATE: 65 BPM | HEIGHT: 73 IN | WEIGHT: 260 LBS | OXYGEN SATURATION: 96 % | TEMPERATURE: 99 F | BODY MASS INDEX: 34.46 KG/M2 | RESPIRATION RATE: 12 BRPM | SYSTOLIC BLOOD PRESSURE: 134 MMHG

## 2025-05-03 VITALS
HEART RATE: 74 BPM | TEMPERATURE: 98 F | DIASTOLIC BLOOD PRESSURE: 65 MMHG | RESPIRATION RATE: 18 BRPM | HEIGHT: 73 IN | OXYGEN SATURATION: 97 % | BODY MASS INDEX: 34.59 KG/M2 | WEIGHT: 261 LBS | SYSTOLIC BLOOD PRESSURE: 146 MMHG

## 2025-05-03 DIAGNOSIS — R53.83 OTHER FATIGUE: ICD-10-CM

## 2025-05-03 DIAGNOSIS — R06.2 WHEEZING: ICD-10-CM

## 2025-05-03 DIAGNOSIS — R05.1 ACUTE COUGH: Primary | ICD-10-CM

## 2025-05-03 DIAGNOSIS — J21.9 ACUTE BRONCHIOLITIS DUE TO UNSPECIFIED ORGANISM: ICD-10-CM

## 2025-05-03 DIAGNOSIS — J22 LOWER RESPIRATORY INFECTION: Primary | ICD-10-CM

## 2025-05-03 DIAGNOSIS — R05.9 COUGH, UNSPECIFIED TYPE: ICD-10-CM

## 2025-05-03 LAB
#MXD IC: 0.7 X10ˆ3/UL (ref 0.1–1)
BUN BLD-MCNC: 32 MG/DL (ref 7–18)
CHLORIDE BLD-SCNC: 104 MMOL/L (ref 98–112)
CO2 BLD-SCNC: 30 MMOL/L (ref 21–32)
CREAT BLD-MCNC: 1.4 MG/DL (ref 0.7–1.3)
DDIMER WHOLE BLOOD: 617 NG/ML DDU (ref ?–400)
EGFRCR SERPLBLD CKD-EPI 2021: 52 ML/MIN/1.73M2 (ref 60–?)
GLUCOSE BLD-MCNC: 114 MG/DL (ref 70–99)
HCT VFR BLD AUTO: 42.1 % (ref 39–53)
HCT VFR BLD CALC: 43 % (ref 37–53)
HGB BLD-MCNC: 13.7 G/DL (ref 13–17.5)
ISTAT IONIZED CALCIUM FOR CHEM 8: 1.11 MMOL/L (ref 1.12–1.32)
LYMPHOCYTES # BLD AUTO: 0.9 X10ˆ3/UL (ref 1–4)
LYMPHOCYTES NFR BLD AUTO: 9.7 %
MCH RBC QN AUTO: 29.4 PG (ref 26–34)
MCHC RBC AUTO-ENTMCNC: 32.5 G/DL (ref 31–37)
MCV RBC AUTO: 90.3 FL (ref 80–100)
MIXED CELL %: 8.1 %
NEUTROPHILS # BLD AUTO: 7.4 X10ˆ3/UL (ref 1.5–7.7)
NEUTROPHILS NFR BLD AUTO: 82.2 %
PLATELET # BLD AUTO: 191 X10ˆ3/UL (ref 150–450)
POTASSIUM BLD-SCNC: 4.5 MMOL/L (ref 3.6–5.1)
RBC # BLD AUTO: 4.66 X10ˆ6/UL (ref 3.8–5.8)
SODIUM BLD-SCNC: 145 MMOL/L (ref 136–145)
TROPONIN I BLD-MCNC: <0.02 NG/ML (ref ?–0.05)
WBC # BLD AUTO: 9 X10ˆ3/UL (ref 4–11)

## 2025-05-03 PROCEDURE — 71046 X-RAY EXAM CHEST 2 VIEWS: CPT | Performed by: STUDENT IN AN ORGANIZED HEALTH CARE EDUCATION/TRAINING PROGRAM

## 2025-05-03 PROCEDURE — 99215 OFFICE O/P EST HI 40 MIN: CPT

## 2025-05-03 PROCEDURE — 36415 COLL VENOUS BLD VENIPUNCTURE: CPT

## 2025-05-03 PROCEDURE — 93010 ELECTROCARDIOGRAM REPORT: CPT

## 2025-05-03 PROCEDURE — 93005 ELECTROCARDIOGRAM TRACING: CPT

## 2025-05-03 PROCEDURE — 80047 BASIC METABLC PNL IONIZED CA: CPT

## 2025-05-03 PROCEDURE — 85025 COMPLETE CBC W/AUTO DIFF WBC: CPT | Performed by: STUDENT IN AN ORGANIZED HEALTH CARE EDUCATION/TRAINING PROGRAM

## 2025-05-03 PROCEDURE — 84484 ASSAY OF TROPONIN QUANT: CPT

## 2025-05-03 PROCEDURE — 85378 FIBRIN DEGRADE SEMIQUANT: CPT | Performed by: STUDENT IN AN ORGANIZED HEALTH CARE EDUCATION/TRAINING PROGRAM

## 2025-05-03 PROCEDURE — 71260 CT THORAX DX C+: CPT | Performed by: STUDENT IN AN ORGANIZED HEALTH CARE EDUCATION/TRAINING PROGRAM

## 2025-05-03 RX ORDER — ALBUTEROL SULFATE 90 UG/1
2 INHALANT RESPIRATORY (INHALATION) EVERY 4 HOURS PRN
Qty: 1 EACH | Refills: 0 | Status: SHIPPED | OUTPATIENT
Start: 2025-05-03 | End: 2025-06-02

## 2025-05-03 RX ORDER — PREDNISONE 20 MG/1
40 TABLET ORAL DAILY
Qty: 8 TABLET | Refills: 0 | Status: SHIPPED | OUTPATIENT
Start: 2025-05-03 | End: 2025-05-07

## 2025-05-03 NOTE — DISCHARGE INSTRUCTIONS
Your chest x-ray showed no signs of pneumonia, your blood work showed no anemia, normal electrolytes, and your baseline kidney function, the CT scan showed no blood clot but did show signs consistent with inflammation of the lungs possibly consistent with bronchitis versus infectious etiology for which I have prescribed steroids to decrease inflammation, and albuterol inhaler to help decrease wheezing, and antibiotics to treat for any potential bacterial infection.    However, even in the setting of antibiotic, infections can still spread, progress, and develop complications, and viral infections can lead to complications, therefore with any chest pain or shortness breath or difficulty breathing, I recommend immediate reassessment by your primary care physician.    Your EKG had subtle changes with no sign of heart attack and no elevation of your cardiac enzyme, please maintain follow with your cardiologist next week.

## 2025-05-03 NOTE — PROGRESS NOTES
CHIEF COMPLAINT:     Chief Complaint   Patient presents with    Cough     Two weeks in [place]. Heavy rain first week. Came down with cough/congestion. Back yesterday. Been over a week - Entered by patient       HPI:   Stanley De Santiago is a 74 year old male who presents for upper respiratory symptoms for  1 days. Patient reports sore throat only at the beginning of sx's, congestion, cough with yellow colored sputum, cough is keeping pt up at night, OTC cold meds have not been helping, denies fever, denies sinus pain. Symptoms have been lingering since onset.  Treating symptoms with Tylenol and OTC cough remedy, helps minimally.  Was [place] on a tour with lots of rain and walking and after 8 days he got a cough, no fever, flew home yesterday. Cough is involuntary and flying home made it worse, more phlegm, nasal congestion not bad. His wife has similar symptoms with cough and sinus pain right now. Some headaches. No viral testing  home. Fatigued, just got from [place] yesterday. Has remote history of bronchitis, no history of inhaler use.    Current Medications[1]   Past Medical History[2]   Past Surgical History[3]      Short Social Hx on File[4]      REVIEW OF SYSTEMS:   Review of Systems   Constitutional:  Negative for chills and fever.   HENT:  Positive for congestion, postnasal drip and rhinorrhea. Negative for ear discharge, ear pain, sinus pressure, sinus pain, sore throat and trouble swallowing.    Eyes:  Negative for discharge and redness.   Respiratory:  Positive for cough. Negative for chest tightness, shortness of breath and wheezing.    Cardiovascular:  Negative for chest pain, palpitations and leg swelling.   Gastrointestinal:  Negative for diarrhea, nausea and vomiting.   Musculoskeletal:  Negative for myalgias.   Skin:  Negative for rash.   Neurological:  Positive for headaches.          EXAM:   /83   Pulse 65   Temp 98.6 °F (37 °C) (Temporal)   Resp 12   Ht 6' 1\" (1.854 m)   Wt 260  lb (117.9 kg)   SpO2 96%   BMI 34.30 kg/m²   Physical Exam  Vitals and nursing note reviewed.   Constitutional:       Appearance: Normal appearance. He is not ill-appearing.   HENT:      Head: Normocephalic and atraumatic.      Right Ear: Hearing, tympanic membrane, ear canal and external ear normal. No drainage. There is no impacted cerumen. Tympanic membrane is not injected, perforated, erythematous or bulging.      Left Ear: Hearing, tympanic membrane, ear canal and external ear normal. No drainage. There is no impacted cerumen. Tympanic membrane is not injected, perforated, erythematous or bulging.      Nose: Mucosal edema and congestion present. No rhinorrhea.      Right Sinus: No maxillary sinus tenderness or frontal sinus tenderness.      Left Sinus: No maxillary sinus tenderness or frontal sinus tenderness.      Mouth/Throat:      Lips: No lesions.      Mouth: Mucous membranes are moist. No oral lesions.      Palate: No lesions.      Pharynx: Oropharynx is clear. Uvula midline. No oropharyngeal exudate or posterior oropharyngeal erythema.      Tonsils: No tonsillar exudate or tonsillar abscesses. 1+ on the right. 1+ on the left.   Eyes:      General: No scleral icterus.        Right eye: No discharge.         Left eye: No discharge.      Conjunctiva/sclera: Conjunctivae normal.   Cardiovascular:      Rate and Rhythm: Normal rate and regular rhythm.      Heart sounds: Normal heart sounds. No murmur heard.  Pulmonary:      Effort: Pulmonary effort is normal. No tachypnea, accessory muscle usage, respiratory distress or retractions.      Breath sounds: Examination of the right-middle field reveals wheezing and rales. Examination of the left-middle field reveals wheezing and rales. Wheezing and rales present.   Lymphadenopathy:      Cervical: No cervical adenopathy.      Right cervical: No superficial or posterior cervical adenopathy.     Left cervical: No superficial or posterior cervical adenopathy.    Skin:     General: Skin is warm and dry.   Neurological:      Mental Status: He is alert and oriented to person, place, and time.   Psychiatric:         Mood and Affect: Mood normal.         Behavior: Behavior normal.           ASSESSMENT AND PLAN:   Stanley De Santiago is a 74 year old male who presents with upper respiratory symptoms that are consistent with    ASSESSMENT:   Encounter Diagnoses   Name Primary?    Acute cough Yes    Wheezing        PLAN: I reviewed the limitations of the walk-in clinic, due to wheezing and adventitious lung sounds it was recommended that patient be reevaluated at a higher level of care.  [place] immediate care provider consulted and patient accepted at this level, report given.  Patient feels well to drive to higher level of care.  Reviewed directions and instructions.     Meds & Refills for this Visit:  Requested Prescriptions      No prescriptions requested or ordered in this encounter                [1]   Current Outpatient Medications   Medication Sig Dispense Refill    terbinafine 250 MG Oral Tab Take 1 tablet (250 mg total) by mouth daily. 90 tablet 0    levothyroxine 100 MCG Oral Tab Take 1 tablet (100 mcg total) by mouth before breakfast. 90 tablet 3    atorvastatin 20 MG Oral Tab Take 1 tablet (20 mg total) by mouth nightly. Watch muscle weakness 90 tablet 3    oxybutynin ER 10 MG Oral Tablet 24 Hr Take 1 tablet (10 mg total) by mouth daily. 90 tablet 3    amLODIPine 10 MG Oral Tab Take 1 tablet (10 mg total) by mouth daily. 90 tablet 3    metoprolol succinate ER 25 MG Oral Tablet 24 Hr Take 1 tablet (25 mg total) by mouth daily.      amiodarone 200 MG Oral Tab Take 1 tablet (200 mg total) by mouth daily.      Multiple Vitamin (MULTIVITAMIN ADULT OR) Take 1 tablet by mouth daily.     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    Unspecified essential hypertension   [3]   Past Surgical History:  Procedure Laterality Date    Cholecystectomy      Colonoscopy  2007    Colonoscopy N/A 1/2/2018    Procedure: COLONOSCOPY;  Surgeon: Shiv Cruz MD;  Location: [facility] ENDOSCOPY    Colonoscopy N/A 2/16/2021    Procedure: COLONOSCOPY;  Surgeon: Shiv Cruz MD;  Location: [facility] ENDOSCOPY    Colonoscopy N/A 9/10/2024    Procedure: COLONOSCOPY;  Surgeon: Shiv Cruz MD;  Location: [facility] ENDOSCOPY    Kidney surgery  2012    left partial nephrectomy    Knee replacement surgery Left 2005    Right TKR 10/2015    Total knee replacement     [4]   Social History  Socioeconomic History    Marital status:    Tobacco Use    Smoking status: Never     Passive exposure: Never    Smokeless tobacco: Never   Vaping Use    Vaping status: Never Used   Substance and Sexual Activity    Alcohol use: Yes     Alcohol/week: 4.0 standard drinks of alcohol     Types: 4 Glasses of wine per week    Drug use: No    Sexual activity: Not Currently     Partners: Female   Other Topics Concern    Caffeine Concern Yes     Comment: coffee 2cups/day     Social Drivers of Health     Food Insecurity: No Food Insecurity (4/7/2025)    NCSS - Food Insecurity     Worried About Running Out of Food in the Last Year: No     Ran Out of Food in the Last Year: No   Transportation Needs: No Transportation Needs (4/7/2025)    NCSS - Transportation     Lack of Transportation: No   Housing Stability: Not At Risk (4/7/2025)    NCSS - Housing/Utilities     Has Housing: Yes     Worried About Losing Housing: No     Unable to Get Utilities: No

## 2025-05-03 NOTE — ED PROVIDER NOTES
Patient Seen in: [facility]      History     Chief Complaint   Patient presents with    Cough/URI     Stated Complaint: chest xray    Subjective:   HPI    74-year-old male with past medical history of HTN, HLD, arthritis for which he has previously tolerated steroids, renal cell carcinoma for which he states he had surgical excision 20 years ago with no complications, and atrial fibrillation not on anticoagulation s/p cardioversion 5/26/2023, who presents with concern for 6 days of cough productive of sputum, also notes nasal congestion and fatigue.  Wife has similar symptoms.  He states he flew to [place] and [place] was wet and cold, he felt the cough was improving 48 hours ago, but on the airplane 24 hours ago on his return flight home, he notes progression of his cough.    Objective:     Past Medical History:    Arrhythmia    Cholecystitis    Disorder of thyroid    Epidermal inclusion cyst    excision of cyst on back, 2013    High blood pressure    High cholesterol    Osteoarthritis    Other and unspecified hyperlipidemia    Prostate enlargement    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    Unspecified essential hypertension              Past Surgical History:   Procedure Laterality Date    Cholecystectomy      Colonoscopy  2007    Colonoscopy N/A 1/2/2018    Procedure: COLONOSCOPY;  Surgeon: Shiv Cruz MD;  Location: [facility] ENDOSCOPY    Colonoscopy N/A 2/16/2021    Procedure: COLONOSCOPY;  Surgeon: Shiv Cruz MD;  Location: [facility] ENDOSCOPY    Colonoscopy N/A 9/10/2024    Procedure: COLONOSCOPY;  Surgeon: Shiv Cruz MD;  Location: [facility] ENDOSCOPY    Kidney surgery  2012    left partial nephrectomy    Knee replacement surgery Left 2005    Right TKR 10/2015    Total knee replacement                  Social History     Socioeconomic History    Marital status:    Tobacco Use    Smoking status: Never     Passive exposure: Never    Smokeless tobacco:  Never   Vaping Use    Vaping status: Never Used   Substance and Sexual Activity    Alcohol use: Yes     Alcohol/week: 4.0 standard drinks of alcohol     Types: 4 Glasses of wine per week    Drug use: No    Sexual activity: Not Currently     Partners: Female   Other Topics Concern    Caffeine Concern Yes     Comment: coffee 2cups/day     Social Drivers of Health     Food Insecurity: No Food Insecurity (4/7/2025)    NCSS - Food Insecurity     Worried About Running Out of Food in the Last Year: No     Ran Out of Food in the Last Year: No   Transportation Needs: No Transportation Needs (4/7/2025)    NCSS - Transportation     Lack of Transportation: No   Housing Stability: Not At Risk (4/7/2025)    NCSS - Housing/Utilities     Has Housing: Yes     Worried About Losing Housing: No     Unable to Get Utilities: No              Review of Systems    Positive for stated complaint: chest xray  Other systems are as noted in HPI.  Constitutional and vital signs reviewed.      All other systems reviewed and negative except as noted above.                  Physical Exam     ED Triage Vitals [05/03/25 1058]   /65   Pulse 74   Resp 18   Temp 97.8 °F (36.6 °C)   Temp src Oral   SpO2 97 %   O2 Device None (Room air)       Current Vitals:   Vital Signs  BP: 146/65  Pulse: 74  Resp: 18  Temp: 97.8 °F (36.6 °C)  Temp src: Oral    Oxygen Therapy  SpO2: 97 %  O2 Device: None (Room air)        Physical Exam          General: Awake, alert, comfortable on room air, in no distress, tolerating oral secretions, interactive  Pulmonary: Diffuse coarse breath sounds throughout, potentially scattered wheezing, no prolonged expiratory phase, good airflow throughout, no tachypnea, no conversational dyspnea  Neuro: Symmetrical facial expressions on gross observation  HEENT: No periorbital edema or erythema, nonerythematous and nonedematous intact B/L TMs, no erythema or edema of the B/L ear canals, nasal congestion is present, nonerythematous and  nonedematous B/L tonsils, no tonsillar exudates, no peritonsillar edema, mild posterior pharyngeal cobblestoning, uvula midline, tolerating oral secretions, normal speech, no submandibular edema  Psych: Normal mood, normal affect    ED Course   Copy of radiology report of patient's chest x-ray:  Narrative  PROCEDURE: XR CHEST PA + LAT CHEST (CPT=71046)     COMPARISON: [facility], CT ABDOMEN+PELVIS (CPT=74176), 8/25/2024, 10:33 AM.  [facility], XR CHEST PA + LAT CHEST (HNS=69357), 4/12/2024, 7:04 AM.  [facility], XR CHEST AP PORTABLE (CPT=71045),  5/25/2023, 8:53 PM.  [facility], XR CHEST AP PORTABLE (CPT=71045), 4/24/2023, 1:07 PM.  [facility], X CHEST PA LAT ROUTINE, 4/07/2005, 10:43 AM.     INDICATIONS: Acute productive cough, congestion and fatigue.     TECHNIQUE:   Two views.       FINDINGS:  CARDIAC/VASC: The cardiomediastinal silhouette is not enlarged. There is mild tortuosity of the thoracic aorta with peripheral atherosclerotic vascular calcification. The pulmonary vascularity is within normal limits.  MEDIAST/DANTE: No visible mass or adenopathy.  LUNGS/PLEURA: No airspace consolidation, pleural effusion, or pneumothorax is evident.    BONES: Left convexity of the lumbar spine is present with compensatory dextroscoliosis of the midthoracic spine. Mild degenerative changes of the thoracic spine are apparent. There are degenerative changes of shoulders bilaterally                 Impression  CONCLUSION:  Stable chest without radiographically evident acute intrathoracic process.           Dictated by (CST): Maurisio Carter MD on 5/03/2025 at 11:21 AM      Finalized by (CST): Maurisio Carter MD on 5/03/2025 at 11:22 AM              Exam Ended: 05/03/25 11:12 Last Resulted: 05/03/25 11:22     Collected 5/3/2025 11:44       Status: Final result    0 Result Notes      Component  Ref Range & Units 5/3/25 1144   WBC IC  4.0 -  11.0 x10ˆ3/uL 9.0   RBC IC  3.80 - 5.80 X10ˆ6/uL 4.66   HGB IC  13.0 - 17.5 g/dL 13.7   HCT IC  39.0 - 53.0 % 42.1   MCV IC  80.0 - 100.0 fL 90.3   MCH IC  26.0 - 34.0 pg 29.4   MCHC IC  31.0 - 37.0 g/dL 32.5   PLT IC  150.0 - 450.0 X10ˆ3/uL 191.0   # Neutrophil  1.5 - 7.7 X10ˆ3/uL 7.4   # Lymphocyte  1.0 - 4.0 X10ˆ3/uL 0.9 Low    # Mixed Cells  0.1 - 1.0 X10ˆ3/uL 0.7   Neutrophil %  % 82.2   Lymphocyte %  % 9.7   Mixed Cell %  % 8.1   [facility] ([facility])             Specimen Collected: 05/03/25 11:44 Last Resulted: 05/03/25 11:54     Collected 5/3/2025 11:55       Status: Final result    0 Result Notes      Component  Ref Range & Units 5/3/25 1155   ISTAT Sodium  136 - 145 mmol/L 145   ISTAT BUN  7 - 18 mg/dL 32 High    ISTAT Potassium  3.6 - 5.1 mmol/L 4.5   ISTAT Chloride  98 - 112 mmol/L 104   ISTAT Ionized Calcium  1.12 - 1.32 mmol/L 1.11 Low    ISTAT Hematocrit  37 - 53 % 43   ISTAT Glucose  70 - 99 mg/dL 114 High    ISTAT TCO2  21 - 32 mmol/L 30   ISTAT Creatinine  0.70 - 1.30 mg/dL 1.40 High    Comment: This test may exhibit falsely elevated results when a sample is collected from a patient who is presently taking Hydroxyurea. If patient is consuming Hydroxyurea, i-STAT creatinine analysis should be considered inaccurate and a sample should be referred to the Central Lab for analysis, if clinically indicated.   eGFR-Cr  >=60 mL/min/1.73m2 52 Low    Comment: eGRF calculated using the CKD-EPI 2021 calculation   [facility] ([facility])             Specimen Collected: 05/03/25 11:55 Last Resulted: 05/03/25 11:58         Collected 5/3/2025 11:44       Status: Final result    0 Result Notes      Component  Ref Range & Units 5/3/25 1144   D-Dimer DDU  <400 ng/mL  High    [facility] ([facility])             Specimen Collected: 05/03/25 11:44 Last Resulted: 05/03/25 12:15     Collected 5/3/2025 12:14       Status: Final result    0 Result Notes      Component  Ref Range & Units 5/3/25  1214   ISTAT Troponin  <0.045 ng/mL ng/mL <0.02   [facility] ([facility])             Specimen Collected: 05/03/25 12:14 Last Resulted: 05/03/25 12:17       EKG    Rate, intervals and axes as noted on EKG Report.  Rate: HR 59  Rhythm: Sinus bradycardia with HR 59  Reading: I do not appreciate any ST elevation or ST depression or T wave inversions, there is flattening throughout the patient's EKG, QTc is 489, IN interval 200,       Copy of radiology report of pt's CTPE:  Narrative  PROCEDURE: CT CHEST PE AORTA (IV ONLY) (CPT=71260)     COMPARISON: [facility], CT CHEST PE AORTA (IV ONLY) (CPT=71260), 4/24/2023, 3:02 PM.     INDICATIONS: fatigue and cough since travel to [place] also h/o afib not on anticoagulation s/p cardioversion with elevated ddimer     TECHNIQUE: CT images of the chest were obtained with non-ionic intravenous contrast material.  Automated exposure control for dose reduction was used. Adjustment of the mA and/or kV was done based on the patient's size. Use of iterative reconstruction  technique for dose reduction was used. Dose information is transmitted to the ACR (American College of Radiology) NRDR (National Radiology Data Registry) which includes the Dose Index Registry.     FINDINGS:  CARDIAC: The cardiomediastinal silhouette is within limits of size.  No pericardial effusion.  No coronary artery calcifications.  MEDIASTINUM/DANTE: No mass or enlarged adenopathy.    LUNGS: Small linear consolidations involving the right middle lobe (30:94).  There are small ground-glass opacities and micro nodules involving the right middle lobe.  There are small ground-glass opacities and micro nodules involving the left lower lobe   (3:128).  Minimal atelectasis/scarring involving the lingula.  There is diffuse peribronchial thickening with scattered mucous plugging involving the left greater than right lower lobe and right middle lobe bronchi.  VASCULATURE: The main pulmonary  artery is normal in caliber.  No acute pulmonary embolism through the segmental pulmonary arteries.  THORACIC AORTA: No aneurysm or dissection.  Mild atherosclerotic calcification of the aortic arch.  The left vertebral artery originates directly off the aortic arch.  PLEURA: No mass or effusion.  No pneumothorax.  CHEST WALL: No axillary mass or enlarged adenopathy.    LIMITED ABDOMEN: The gallbladder surgically absent.  BONES: Mild dextrocurvature of the thoracic spine.  Multilevel degenerative changes of the thoracic spine.  No acute fracture.  No destructive osseous lesion.  Mild bilateral shoulder degenerative change.  OTHER: Negative.                Impression  CONCLUSION:     1. No acute pulmonary embolism through the segmental pulmonary arteries.  2. Small linear consolidations involving the right middle lobe with a few ground-glass opacities and micronodules in the right middle lobe and left greater than right lower lobes.  These findings are favored to be secondary to infectious/inflammatory  bronchiolitis.  3. Diffuse peribronchial thickening with scattered mucous plugging involving the segmental bronchi involving the left greater than right lower lobes and right middle lobe.  4. Lesser incidental findings described above.             Dictated by (CST): Teja Paige MD on 5/03/2025 at 1:54 PM      Finalized by (CST): Teja Paige MD on 5/03/2025 at 2:00 PM              Exam Ended: 05/03/25 12:40 Last Resulted: 05/03/25 14:00         MDM   Patient is awake, alert, comfortable on room air, in no distress, afebrile, diffuse coarse breath sounds throughout the lung fields with potentially scattered wheezing but good airflow throughout with no prolonged expiratory phase, consistent with bronchitis with potential early bronchospasm versus postviral pneumonia, no sign otitis media otitis externa, no sign of tonsillitis or PTA or deep space infection, discussed starting with chest x-ray imaging to assess  for potential pneumonia, if chest x-ray does not explain his symptoms, we will also assess for potential cardiac etiology with troponin and EKG given patient does report fatigue, CBC to ensure no anemia, BMP to assess kidney function and electrolytes, and D-dimer to ensure no VTE as patient has just returned from [place] and flew home yesterday as well as patient has history of atrial fibrillation s/p cardioversion no longer on anticoagulation, patient is agreeable with this plan  -Per my personal review interpretation of the patient's chest x-ray, there is normal lung expansion with no effusions, no consolidations, no pneumothoraces, this is consistent with my review of the radiology report as copied above.  -Patient's EKG shows no ST elevation and no ST depression and no T wave inversion with no STEMI, there is flattening throughout which is new when compared to EKG from 5/26/2023 which is the most recent EKG for comparison, EKG reports prolonged QTc at 489  -Troponin is less than 0.02 which in the setting of nonischemic EKG rules out ACS  -I did message on-call cardiology provider, Alicia KENDALL, as patient follows with Dr. Abraham, to also assess EKG given machine reading on EKG reports ST and T wave abnormality which I do not personally appreciate on his EKG other than flattening throughout, she also agrees no concerning findings at this time to indicate emergent intervention, can follow-up next week, patient already has follow-up next week  -This was all discussed with the patient  -Patient's BMP shows creatinine of 1.40 and GFR of 52 which is consistent with similar previous results, BMP from 1/2/2025 with creatinine of 1.46 and GFR of 50, creatinine clearance today 78  -Patient's D-dimer is 617, patient agreeable to CT PE, did discuss  that contrast dye can affect kidney function, but GFR is greater than 30 and CTPE therefore is not contraindicated, patient is agreeable to CT PE  -Per my personal  review and interpretation of the patient's CT PE, I do not appreciate any PEs.  This is consistent with my review of the radiology report which also does note small linear consolidations in the right middle lobe with a few groundglass opacities and micronodules in the right middle lobe and left greater than right lower lobe, favored to be secondary to infectious/inflammatory bronchiolitis. Also reports diffuse peribronchial thickening and scattered mucus plugging  -We discussed that exam and story is most consistent with bronchiolitis/airway inflammation, which is usually due to underlying viral infection, but given consolidations noted on CT scan, will also cover with Augmentin for potential bacterial infection, azithromycin and doxycycline avoided at this time given prolonged QTc reported on EKG, patient denies antibiotic allergies, no indication for dosing adjustment as creatinine clearance today is 78  - Will treat for airway inflammation and potential early bronchospasm in the setting of likely bronchiolitis with prednisone, patient has no history of diabetes, as well as with albuterol inhaler, patient is agreeable to this plan  - Currently, no signs of respiratory distress, but did discuss that airway hyperreactivity and inflammation can rapidly progress to become life-threatening, to maintain the albuterol inhaler on him at all times, but with any chest pain, shortness of breath, increased usage of albuterol more than prescribed, or any other concerning signs or symptoms, should present immediately to the emergency department  -To f/u with his primary care provider this week fore reassessment of his clinical course      Medical Decision Making  Amount and/or Complexity of Data Reviewed  External Data Reviewed: labs and ECG.     Details: EKG from 5/26/2023 and BMP from 1/2/2025 assessed  Labs: ordered.  Radiology: ordered and independent interpretation performed.  ECG/medicine tests: ordered and independent  interpretation performed.  Discussion of management or test interpretation with external provider(s): EKG and story discussed with on-call cardiology provider, Alicia Pacheco  Prescription drug management.        Disposition and Plan     Clinical Impression:  1. Lower respiratory infection    2. Other fatigue    3. Cough, unspecified type    4. Acute bronchiolitis due to unspecified organism         Disposition:  Discharge  5/3/2025  2:10 pm    Follow-up:  Wilbur Martin MD  [address] [facility] [address]  [phone]    In 3 days      Khalif Abraham MD  [address]  [facility] [address]  [phone]      cardiology          Medications Prescribed:  Discharge Medication List as of 5/3/2025  2:10 PM            albuterol 108 (90 Base) MCG/ACT Inhalation Aero Soln 1 each 0 5/3/2025 6/2/2025   Sig:   Inhale 2 puffs into the lungs every 4 (four) hours as needed for Wheezing or Shortness of Breath.     Route:   Inhalation     PRN Reason(s):   Wheezing, Shortness of Breath         predniSONE 20 MG Oral Tab 8 tablet 0 5/3/2025 5/7/2025   Sig:   Take 2 tablets (40 mg total) by mouth daily for 4 days.     Route:   Oral         amoxicillin clavulanate 875-125 MG Oral Tab 14 tablet 0 5/3/2025 5/10/2025   Sig:   Take 1 tablet by mouth 2 (two) times daily for 7 days.     Route:   Oral

## 2025-05-03 NOTE — ED INITIAL ASSESSMENT (HPI)
Cough, congestion x 8 days.  Recently traveled to [place].  Denies fever.  Productive cough - reports yellow mucus.

## 2025-05-04 LAB
ATRIAL RATE: 59 BPM
P AXIS: 80 DEGREES
P-R INTERVAL: 200 MS
Q-T INTERVAL: 494 MS
QRS DURATION: 100 MS
QTC CALCULATION (BEZET): 489 MS
R AXIS: 28 DEGREES
T AXIS: 110 DEGREES
VENTRICULAR RATE: 59 BPM

## 2025-05-09 ENCOUNTER — LAB ENCOUNTER (OUTPATIENT)
Dept: LAB | Facility: HOSPITAL | Age: 75
End: 2025-05-09
Attending: UROLOGY
Payer: MEDICARE

## 2025-05-09 DIAGNOSIS — I10 HYPERTENSION, ESSENTIAL: ICD-10-CM

## 2025-05-09 DIAGNOSIS — Z79.899 ON AMIODARONE THERAPY: ICD-10-CM

## 2025-05-09 DIAGNOSIS — N40.1 BPH WITH OBSTRUCTION/LOWER URINARY TRACT SYMPTOMS: ICD-10-CM

## 2025-05-09 DIAGNOSIS — B35.1 ONYCHOMYCOSIS: ICD-10-CM

## 2025-05-09 DIAGNOSIS — N13.8 BPH WITH OBSTRUCTION/LOWER URINARY TRACT SYMPTOMS: ICD-10-CM

## 2025-05-09 DIAGNOSIS — I48.92 ATRIAL FLUTTER (HCC): Primary | ICD-10-CM

## 2025-05-09 DIAGNOSIS — N40.1 BPH WITH OBSTRUCTION/LOWER URINARY TRACT SYMPTOMS: Primary | ICD-10-CM

## 2025-05-09 DIAGNOSIS — N13.8 BPH WITH OBSTRUCTION/LOWER URINARY TRACT SYMPTOMS: Primary | ICD-10-CM

## 2025-05-09 LAB
ALBUMIN SERPL-MCNC: 4.4 G/DL (ref 3.2–4.8)
ALBUMIN/GLOB SERPL: 1.7 {RATIO} (ref 1–2)
ALP LIVER SERPL-CCNC: 92 U/L (ref 45–117)
ALT SERPL-CCNC: 33 U/L (ref 10–49)
ANION GAP SERPL CALC-SCNC: 6 MMOL/L (ref 0–18)
AST SERPL-CCNC: 24 U/L (ref ?–34)
BILIRUB DIRECT SERPL-MCNC: 0.1 MG/DL (ref ?–0.3)
BILIRUB SERPL-MCNC: 0.4 MG/DL (ref 0.2–1.1)
BILIRUB UR QL: NEGATIVE
BUN BLD-MCNC: 27 MG/DL (ref 9–23)
BUN/CREAT SERPL: 20 (ref 10–20)
CALCIUM BLD-MCNC: 9.2 MG/DL (ref 8.7–10.4)
CHLORIDE SERPL-SCNC: 106 MMOL/L (ref 98–112)
CLARITY UR: CLEAR
CO2 SERPL-SCNC: 30 MMOL/L (ref 21–32)
COLOR UR: YELLOW
CREAT BLD-MCNC: 1.35 MG/DL (ref 0.7–1.3)
EGFRCR SERPLBLD CKD-EPI 2021: 54 ML/MIN/1.73M2 (ref 60–?)
FASTING STATUS PATIENT QL REPORTED: NO
GLOBULIN PLAS-MCNC: 2.6 G/DL (ref 2–3.5)
GLUCOSE BLD-MCNC: 89 MG/DL (ref 70–99)
GLUCOSE UR-MCNC: NORMAL MG/DL
HGB UR QL STRIP.AUTO: NEGATIVE
HYALINE CASTS #/AREA URNS AUTO: PRESENT /LPF
KETONES UR-MCNC: NEGATIVE MG/DL
LEUKOCYTE ESTERASE UR QL STRIP.AUTO: 75
NITRITE UR QL STRIP.AUTO: NEGATIVE
OSMOLALITY SERPL CALC.SUM OF ELEC: 299 MOSM/KG (ref 275–295)
PH UR: 6 [PH] (ref 5–8)
POTASSIUM SERPL-SCNC: 3.8 MMOL/L (ref 3.5–5.1)
PROT SERPL-MCNC: 7 G/DL (ref 5.7–8.2)
PROT UR-MCNC: 20 MG/DL
SODIUM SERPL-SCNC: 142 MMOL/L (ref 136–145)
SP GR UR STRIP: 1.02 (ref 1–1.03)
T4 FREE SERPL-MCNC: 2 NG/DL (ref 0.8–1.7)
TSI SER-ACNC: 2 UIU/ML (ref 0.55–4.78)
UROBILINOGEN UR STRIP-ACNC: NORMAL

## 2025-05-09 PROCEDURE — 84439 ASSAY OF FREE THYROXINE: CPT

## 2025-05-09 PROCEDURE — 81001 URINALYSIS AUTO W/SCOPE: CPT

## 2025-05-09 PROCEDURE — 80053 COMPREHEN METABOLIC PANEL: CPT

## 2025-05-09 PROCEDURE — 36415 COLL VENOUS BLD VENIPUNCTURE: CPT

## 2025-05-09 PROCEDURE — 82248 BILIRUBIN DIRECT: CPT

## 2025-05-09 PROCEDURE — 84443 ASSAY THYROID STIM HORMONE: CPT

## 2025-05-12 ENCOUNTER — OFFICE VISIT (OUTPATIENT)
Dept: SURGERY | Facility: CLINIC | Age: 75
End: 2025-05-12

## 2025-05-12 ENCOUNTER — LAB ENCOUNTER (OUTPATIENT)
Dept: LAB | Facility: HOSPITAL | Age: 75
End: 2025-05-12
Attending: UROLOGY
Payer: MEDICARE

## 2025-05-12 DIAGNOSIS — N13.8 BPH WITH OBSTRUCTION/LOWER URINARY TRACT SYMPTOMS: ICD-10-CM

## 2025-05-12 DIAGNOSIS — N40.1 BPH WITH OBSTRUCTION/LOWER URINARY TRACT SYMPTOMS: ICD-10-CM

## 2025-05-12 DIAGNOSIS — N40.1 BPH WITH OBSTRUCTION/LOWER URINARY TRACT SYMPTOMS: Primary | ICD-10-CM

## 2025-05-12 DIAGNOSIS — N13.8 BPH WITH OBSTRUCTION/LOWER URINARY TRACT SYMPTOMS: Primary | ICD-10-CM

## 2025-05-12 LAB
BILIRUB UR QL: NEGATIVE
CLARITY UR: CLEAR
COLOR UR: YELLOW
GLUCOSE UR-MCNC: NORMAL MG/DL
HGB UR QL STRIP.AUTO: NEGATIVE
HYALINE CASTS #/AREA URNS AUTO: PRESENT /LPF
KETONES UR-MCNC: NEGATIVE MG/DL
LEUKOCYTE ESTERASE UR QL STRIP.AUTO: 25
NITRITE UR QL STRIP.AUTO: NEGATIVE
PH UR: 7 [PH] (ref 5–8)
PROT UR-MCNC: NEGATIVE MG/DL
SP GR UR STRIP: 1.01 (ref 1–1.03)
UROBILINOGEN UR STRIP-ACNC: NORMAL

## 2025-05-12 PROCEDURE — 99213 OFFICE O/P EST LOW 20 MIN: CPT | Performed by: UROLOGY

## 2025-05-12 PROCEDURE — 81001 URINALYSIS AUTO W/SCOPE: CPT

## 2025-05-12 NOTE — PROGRESS NOTES
Stanley De Santiago is a 74 year old male.    HPI:     Chief Complaint   Patient presents with    BPH     Yearly f/u         74-year-old male in follow-up to a visit May 6, 2024 with a history of BPH, lower urinary tract symptoms and residual overactive bladder symptoms well-controlled currently on oxybutynin 10 mg daily.  For the most part, he feels well.  No problems or concerns.  IPSS score today is 11 quality-of-life index score is 3.  No gross hematuria or dysuria is reported.  Urinalysis with microscopy May 9, 2025 demonstrated microhematuria.  He states he returned last week from a trip to [place] and had a significant upper respiratory infection marked by significant coughing and bodyaches.  He has responded well to treatment with steroids and albuterol.    HISTORY:  Past Medical History[1]   Past Surgical History[2]   Family History[3]   Social History: Short Social Hx on File[4]     Medications (Active prior to today's visit):  Current Medications[5]    Allergies:  Allergies[6]      ROS:       PHYSICAL EXAM:        ASSESSMENT/PLAN:   Assessment   Encounter Diagnosis   Name Primary?    BPH with obstruction/lower urinary tract symptoms Yes         Recommend:  - Overactive bladder symptoms: He will trial discontinuation of oxybutynin.  If no significant change in symptoms he will stay off of it for now.  - Microhematuria: Repeat urinalysis today.  If normal, no further evaluation.  If positive for blood further evaluation with CT urogram and office cystoscopy.       Orders This Visit:  Orders Placed This Encounter   Procedures    Urinalysis, Routine       Meds This Visit:  Requested Prescriptions      No prescriptions requested or ordered in this encounter       Imaging & Referrals:  None     5/12/2025  Hossein Pace MD               [1]   Past Medical History:   Arrhythmia    Cholecystitis    Disorder of thyroid    Epidermal inclusion cyst    excision of cyst on back, 2013    High blood pressure    High  cholesterol    Osteoarthritis    Other and unspecified hyperlipidemia    Prostate enlargement    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    Unspecified essential hypertension   [2]   Past Surgical History:  Procedure Laterality Date    Cholecystectomy      Colonoscopy  2007    Colonoscopy N/A 1/2/2018    Procedure: COLONOSCOPY;  Surgeon: Shiv Cruz MD;  Location: [facility] ENDOSCOPY    Colonoscopy N/A 2/16/2021    Procedure: COLONOSCOPY;  Surgeon: Shiv Cruz MD;  Location: [facility] ENDOSCOPY    Colonoscopy N/A 9/10/2024    Procedure: COLONOSCOPY;  Surgeon: Shiv Cruz MD;  Location: [facility] ENDOSCOPY    Kidney surgery  2012    left partial nephrectomy    Knee replacement surgery Left 2005    Right TKR 10/2015    Total knee replacement     [3]   Family History  Problem Relation Age of Onset    Cancer Mother     Breast Cancer Mother     Alcohol and Other Disorders Associated Father         alcoholism   [4]   Social History  Socioeconomic History    Marital status:    Tobacco Use    Smoking status: Never     Passive exposure: Never    Smokeless tobacco: Never   Vaping Use    Vaping status: Never Used   Substance and Sexual Activity    Alcohol use: Yes     Alcohol/week: 4.0 standard drinks of alcohol     Types: 4 Glasses of wine per week    Drug use: No    Sexual activity: Not Currently     Partners: Female   Other Topics Concern    Caffeine Concern Yes     Comment: coffee 2cups/day     Social Drivers of Health     Food Insecurity: No Food Insecurity (4/7/2025)    NCSS - Food Insecurity     Worried About Running Out of Food in the Last Year: No     Ran Out of Food in the Last Year: No   Transportation Needs: No Transportation Needs (4/7/2025)    NCSS - Transportation     Lack of Transportation: No   Housing Stability: Not At Risk (4/7/2025)    NCSS - Housing/Utilities     Has Housing: Yes     Worried About Losing Housing: No     Unable to Get Utilities: No   [5]   Current  Outpatient Medications   Medication Sig Dispense Refill    albuterol 108 (90 Base) MCG/ACT Inhalation Aero Soln Inhale 2 puffs into the lungs every 4 (four) hours as needed for Wheezing or Shortness of Breath. 1 each 0    terbinafine 250 MG Oral Tab Take 1 tablet (250 mg total) by mouth daily. (Patient not taking: Reported on 5/12/2025) 90 tablet 0    levothyroxine 100 MCG Oral Tab Take 1 tablet (100 mcg total) by mouth before breakfast. 90 tablet 3    atorvastatin 20 MG Oral Tab Take 1 tablet (20 mg total) by mouth nightly. Watch muscle weakness 90 tablet 3    oxybutynin ER 10 MG Oral Tablet 24 Hr Take 1 tablet (10 mg total) by mouth daily. 90 tablet 3    amLODIPine 10 MG Oral Tab Take 1 tablet (10 mg total) by mouth daily. 90 tablet 3    metoprolol succinate ER 25 MG Oral Tablet 24 Hr Take 1 tablet (25 mg total) by mouth daily.      amiodarone 200 MG Oral Tab Take 1 tablet (200 mg total) by mouth daily.      Multiple Vitamin (MULTIVITAMIN ADULT OR) Take 1 tablet by mouth daily.     [6] No Known Allergies

## 2025-05-13 ENCOUNTER — OFFICE VISIT (OUTPATIENT)
Dept: PODIATRY CLINIC | Facility: CLINIC | Age: 75
End: 2025-05-13

## 2025-05-13 DIAGNOSIS — B35.1 ONYCHOMYCOSIS: Primary | ICD-10-CM

## 2025-05-13 PROCEDURE — 99214 OFFICE O/P EST MOD 30 MIN: CPT | Performed by: PODIATRIST

## 2025-05-13 RX ORDER — TERBINAFINE HYDROCHLORIDE 250 MG/1
250 TABLET ORAL DAILY
Qty: 90 TABLET | Refills: 0 | Status: SHIPPED | OUTPATIENT
Start: 2025-05-13

## 2025-05-13 NOTE — PROGRESS NOTES
Reason for Visit      Stnaley De Santiago is a 74 year old male presents today complaining of bilateral foot evaluation.     History of Present Illness     Patient presents to clinic today for follow-up management of bilateral lower extremity onychomycosis and callus.  Patient was last seen by podiatry in 2022 for management of possible to plantar verruca on his left foot.  Patient presents to clinic today with multiple issues in regards to bilateral lower extremities.  Patient tried oral Lamisil many years ago for a few months and noted to some improvement but not a significant mount.  Patient also has concerns with curling of his digits as well as pain to the second digits bilateral.    Patient returns to clinic today for 3-month follow-up of bilateral second digits.  Patient states that he has had no side effects with the medication and noticed improvement in the second digits.    Patient was last seen by me on 2/11/2025.  Patient has noted significant improvement on most of his toes other than right second toe, the left second toe and the left hallux.  Patient denies any side effects from the medication.  Patient's last blood work was within normal limits.    The following portions of the patient's history were reviewed and updated as appropriate: allergies, current medications, past family history, past medical history, past social history, past surgical history and problem list.    Allergies[1]      Current Outpatient Medications:     levothyroxine 100 MCG Oral Tab, Take 1 tablet (100 mcg total) by mouth before breakfast., Disp: 90 tablet, Rfl: 3    atorvastatin 20 MG Oral Tab, Take 1 tablet (20 mg total) by mouth nightly. Watch muscle weakness, Disp: 90 tablet, Rfl: 3    oxybutynin ER 10 MG Oral Tablet 24 Hr, Take 1 tablet (10 mg total) by mouth daily., Disp: 90 tablet, Rfl: 3    amLODIPine 10 MG Oral Tab, Take 1 tablet (10 mg total) by mouth daily., Disp: 90 tablet, Rfl: 3    metoprolol succinate ER 25 MG  Oral Tablet 24 Hr, Take 1 tablet (25 mg total) by mouth daily., Disp: , Rfl:     amiodarone 200 MG Oral Tab, Take 1 tablet (200 mg total) by mouth daily., Disp: , Rfl:     Multiple Vitamin (MULTIVITAMIN ADULT OR), Take 1 tablet by mouth daily., Disp: , Rfl:     terbinafine 250 MG Oral Tab, Take 1 tablet (250 mg total) by mouth daily. (Patient not taking: Reported on 5/13/2025), Disp: 90 tablet, Rfl: 0    There are no discontinued medications.    Patient Active Problem List   Diagnosis    Benign prostatic hyperplasia without lower urinary tract symptoms    Essential hypertension    Primary osteoarthritis of right knee    Calculus of kidney    Cholelithiasis    Nodular prostate with urinary obstruction    Pain in joint, pelvic region and thigh    History of renal cell carcinoma    History of atrial flutter       Past Medical History:    Arrhythmia    Cholecystitis    Disorder of thyroid    Epidermal inclusion cyst    excision of cyst on back, 2013    High blood pressure    High cholesterol    Osteoarthritis    Other and unspecified hyperlipidemia    Prostate enlargement    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    Unspecified essential hypertension       Past Surgical History:   Procedure Laterality Date    Cholecystectomy      Colonoscopy  2007    Colonoscopy N/A 1/2/2018    Procedure: COLONOSCOPY;  Surgeon: Shiv Cruz MD;  Location: [facility] ENDOSCOPY    Colonoscopy N/A 2/16/2021    Procedure: COLONOSCOPY;  Surgeon: Shiv Cruz MD;  Location: [facility] ENDOSCOPY    Colonoscopy N/A 9/10/2024    Procedure: COLONOSCOPY;  Surgeon: Shiv Cruz MD;  Location: [facility] ENDOSCOPY    Kidney surgery  2012    left partial nephrectomy    Knee replacement surgery Left 2005    Right TKR 10/2015    Total knee replacement         Family History   Problem Relation Age of Onset    Cancer Mother     Breast Cancer Mother     Alcohol and Other Disorders Associated Father         alcoholism       Social  History     Occupational History    Not on file   Tobacco Use    Smoking status: Never     Passive exposure: Never    Smokeless tobacco: Never   Vaping Use    Vaping status: Never Used   Substance and Sexual Activity    Alcohol use: Yes     Alcohol/week: 4.0 standard drinks of alcohol     Types: 4 Glasses of wine per week    Drug use: No    Sexual activity: Not Currently     Partners: Female       ROS      Constitutional: negative for chills, fevers and sweats  Gastrointestinal: negative for abdominal pain, diarrhea, nausea and vomiting  Genitourinary:negative for dysuria and hematuria  Musculoskeletal:negative for arthralgias and muscle weakness  Neurological: negative for paresthesia and weakness  All others reviewed and negative.      Physical Exam     LE PHYSICAL EXAM    Constitution: Well-developed and well-nourished. Gait appears normal. No apparent distress. Alert and oriented to person, place, and time.  Integument: There are no varicosities. Skin appears moist, warm, and supple with positive hair growth. There are no color changes. No open lesions. No macerations, No Hyperkeratotic lesions.  Vascular examination: Dorsalis pedis and posterior tibial pulses are strong bilaterally with capillary filling time less than 3 seconds to all digits. There is no peripheral edema..  Neurological Sensorium: Grossly intact to sharp/dull. Vibratory: Intact.  Musculoskeletal:   5/5 pedal muscle strength b/l       Contracted digits 2 through 5 bilateral with thickened nails 1 through 5 bilateral.  No open lesions signs of infection noted.  Hyperkeratotic lesion dorsal lateral aspect of left fifth digit    Assessment and Plan     Encounter Diagnoses   Name Primary?    Onychomycosis Yes       In regards to the onychomycosis went over both topical oral laser removal and compounding.  Patient opted for oral.  Reviewed last blood work which BUN/creatinine had resolved but he has a history of elevation.  Placed an order for  hepatic function panel to be done prior to prescribing oral medication which patient is interested in    -In regards to the hammertoe surgery discussed in great detail and gave information about toe crest pads.  Discussed hammertoe surgery in great detail.  Patient states that he was in part of injury of a hammertoe surgery that got bad.  Discussed risk and benefits with hammertoe surgery.  Patient was instructed to call the office or on-call podiatric physician immediately with any issues or concerns before the next scheduled visit. Patient to follow-up in clinic in after blood work patient has noted improvement in the second digits with a 3 months of oral medication.  Patient's blood work is within normal limits and patient has no side effects.  Prescribed for another 3 months.  Once again discussed side effects and to please stop medication immediately and to call us with any issues      Chula Hicks DPM, [email or web address] ROSE  Diplomat, American Board of Foot and Ankle Surgery  Certified in Foot and Rearfoot/Ankle Reconstruction  Fellow of the American College of Foot and Ankle Surgeons  Fellowship Trained Foot and Ankle Surgeon   [facility] ([place], [place])     10/30/2024    5:49 AM         [1] No Known Allergies

## 2025-06-09 RX ORDER — ATORVASTATIN CALCIUM 20 MG/1
20 TABLET, FILM COATED ORAL NIGHTLY
Qty: 90 TABLET | Refills: 3 | Status: SHIPPED | OUTPATIENT
Start: 2025-06-09

## 2025-06-09 NOTE — TELEPHONE ENCOUNTER
Refill Per Protocol     Requested Prescriptions   Pending Prescriptions Disp Refills    atorvastatin 20 MG Oral Tab 90 tablet 3     Sig: Take 1 tablet (20 mg total) by mouth nightly. Watch muscle weakness       Cholesterol Medication Protocol Passed - 6/9/2025  8:14 AM        Passed - ALT < 80     Lab Results   Component Value Date    ALT 33 05/09/2025             Passed - ALT resulted within past year        Passed - Lipid panel within past 12 months     Lab Results   Component Value Date    CHOLEST 120 01/02/2025    TRIG 78 01/02/2025    HDL 36 (L) 01/02/2025    LDL 68 01/02/2025    VLDL 12 01/02/2025    NONHDLC 84 01/02/2025             Passed - In person appointment or virtual visit in the past 12 mos or appointment in next 3 mos     Recent Outpatient Visits              3 weeks ago Onychomycosis    [facility] [place] Knight, Jessica Marie, DPM    Office Visit    4 weeks ago BPH with obstruction/lower urinary tract symptoms    [facility] Hossein Pace MD    Office Visit    1 month ago Acute cough    [facility] Sona Hodge APRN    Office Visit    1 month ago Dermoid cyst of skin of back    [facility] Hoa Talley PA    Nurse Only    2 months ago Sebaceous cyst    [facility] Wilbur Martin MD    Office Visit          Future Appointments         Provider Department Appt Notes    In 2 weeks Wilbur Alvarez DO [facility] lower back pain    In 4 weeks Wilbur Martin MD [facility] 6 month followup                    Passed - Medication is active on med list

## 2025-06-24 ENCOUNTER — OFFICE VISIT (OUTPATIENT)
Dept: PHYSICAL MEDICINE AND REHAB | Facility: CLINIC | Age: 75
End: 2025-06-24
Payer: MEDICARE

## 2025-06-24 VITALS — WEIGHT: 262 LBS | BODY MASS INDEX: 35 KG/M2

## 2025-06-24 DIAGNOSIS — M47.816 LUMBAR SPONDYLOSIS: Primary | ICD-10-CM

## 2025-06-24 PROCEDURE — 99204 OFFICE O/P NEW MOD 45 MIN: CPT | Performed by: PHYSICAL MEDICINE & REHABILITATION

## 2025-06-24 NOTE — PROGRESS NOTES
The following individual(s) verbally consented to be recorded using ambient AI listening technology and understand that they can each withdraw their consent to this listening technology at any point by asking the clinician to turn off or pause the recording:    Patient name: Stanley Marsh Jonnathan

## 2025-06-24 NOTE — PROGRESS NOTES
NEW PATIENT VISIT    CHIEF COMPLAINT  Low back pain      HISTORY OF PRESENTING ILLNESS    Stanley De Santiago is a 74 year old male who presents for evaluation of low back pain.  Patient presents axial back which started several years ago.  Pain is described as a stabbing sensation when walking for prolonged distances or sharp pain with laying on his back.  No numbness and tingling or radiation of symptoms.  He feels as though maybe there was a lifting event that caused his low back pain to initially start however this is not necessarily clear.    He has not had any injections or physical therapy for his back.  No medications currently.  Current level of pain at rest is 1/10.    History of Present Illness  Stanley De Santiago is a 74 year old male with degenerative lumbar spine changes who presents with persistent back pain.    He experiences persistent low-grade back pain that worsens with activities such as walking long distances or bending over, like when cutting grass, and improves with sitting. He attributes some discomfort to suboptimal posture.    He participates in [employer] three times a week. Approximately two weeks ago, he experienced sharp back pain during a 'hollow rock' exercise and again during a bench press. The sharp pain occurs upon initial contact with a hard surface but subsides quickly, allowing him to continue exercising.    He has occasional tingling in his legs, mostly on one side, but denies searing pain down the legs. An MRI in late 2024 showed significant degenerative changes in the lumbar spine with arthritis and foraminal narrowing. He has not tried any treatments or medications for his back pain yet.     PAST MEDICAL HISTORY  Past Medical History[1]    PAST SURGICAL HISTORY  Past Surgical History[2]    MEDICATIONS  Medications Ordered Prior to Encounter[3]    ALLERGIES  Allergies[4]    SOCIAL HISTORY   reports that he has never smoked. He has never been exposed to tobacco smoke. He has  never used smokeless tobacco. He reports current alcohol use of about 4.0 standard drinks of alcohol per week. He reports that he does not use drugs.    FAMILY HISTORY  Family History[5]    REVIEW OF SYSTEMS  Complete review of systems was performed and was negative except for those items stated in the History of Presenting Illness and Past Medical/Surgical History.    PHYSICAL EXAMINATION  GENERAL:  In no acute distress. Well-developed and well nourished.   SKIN: No rashes or open wounds involving posterior torso, posterior pelvis, lower extremities.  NEUROLOGIC:   Strength: 5/5 bilaterally with hip flexion, knee extension, knee flexion, ankle dorsiflexion, ankle eversion, ankle inversion, ankle plantarflexion, and great toe extension.   Sensation: intact light touch sensation throughout both lower extremities.   Reflexes: intact and symmetric in bilateral lower extremities. Babinski downgoing bilaterally. No clonus.   Gait: able to heel walk, toe walk, and perform tandem gait.   MUSCULOSKELETAL:  Physical Exam  MUSCULOSKELETAL: Spine examination reveals no acute distress. Discomfort is elicited upon extension of the spine. Flexion of the spine does not cause discomfort.       REVIEW OF PRIOR X-RAYS/STUDIES  Independently reviewed the MRI of the lumbar spine dated 9/30/2024 which reveals degenerative change most notably anteriorly at L1-2 and L2-3 with some foraminal narrowing at L3-4.  There is degenerative changes at L4-5 with facet joint hypertrophy.  This results in severe left and moderate right foraminal narrowing.  There is also severe left joint degeneration at L5-S1 which results in severe left foraminal narrowing.    IMPRESSION/DIAGNOSIS  Encounter Diagnosis   Name Primary?    Lumbar spondylosis Yes       TREATMENT/PLAN  Assessment & Plan  Low back arthritis with nerve involvement  Chronic low back arthritis with degenerative changes and foraminal narrowing. MRI shows significant arthritis and nerve  involvement. Pain attributed to arthritis, not nerve compression. Sharp pain likely from bone bruise or spinous process irritation, expected to resolve.  - Refer to physical therapy for lumbar spine movement and alignment.  - Consider corticosteroid injections or radiofrequency ablation if physical therapy fails.      Education was provided regarding the above impression/diagnosis and treatment options/plan were discussed.  All questions were answered during today's visit.  Patient will contact clinic if any other questions or concerns.            Wilbur Alvarez DO  Interventional Spine and Sports Medicine Specialist   Physical Medicine and Rehabilitation  [place] [id number] [place] [id number]    [facility]  [address] [place] [address]               [1]   Past Medical History:   Arrhythmia    Cholecystitis    Disorder of thyroid    Epidermal inclusion cyst    excision of cyst on back, 2013    High blood pressure    High cholesterol    Osteoarthritis    Other and unspecified hyperlipidemia    Prostate enlargement    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    Unspecified essential hypertension   [2]   Past Surgical History:  Procedure Laterality Date    Cholecystectomy      Colonoscopy  2007    Colonoscopy N/A 1/2/2018    Procedure: COLONOSCOPY;  Surgeon: Shiv Cruz MD;  Location: [facility] ENDOSCOPY    Colonoscopy N/A 2/16/2021    Procedure: COLONOSCOPY;  Surgeon: Shiv Cruz MD;  Location: [facility] ENDOSCOPY    Colonoscopy N/A 9/10/2024    Procedure: COLONOSCOPY;  Surgeon: Shiv Cruz MD;  Location: [facility] ENDOSCOPY    Kidney surgery  2012    left partial nephrectomy    Knee replacement surgery Left 2005    Right TKR 10/2015    Total knee replacement     [3]   Current Outpatient Medications on File Prior to Visit   Medication Sig Dispense Refill    atorvastatin 20 MG Oral Tab Take 1 tablet (20 mg total) by  mouth nightly. Watch muscle weakness 90 tablet 3    terbinafine 250 MG Oral Tab Take 1 tablet (250 mg total) by mouth daily. 90 tablet 0    terbinafine 250 MG Oral Tab Take 1 tablet (250 mg total) by mouth daily. (Patient not taking: Reported on 5/13/2025) 90 tablet 0    levothyroxine 100 MCG Oral Tab Take 1 tablet (100 mcg total) by mouth before breakfast. 90 tablet 3    oxybutynin ER 10 MG Oral Tablet 24 Hr Take 1 tablet (10 mg total) by mouth daily. 90 tablet 3    amLODIPine 10 MG Oral Tab Take 1 tablet (10 mg total) by mouth daily. 90 tablet 3    metoprolol succinate ER 25 MG Oral Tablet 24 Hr Take 1 tablet (25 mg total) by mouth daily.      amiodarone 200 MG Oral Tab Take 1 tablet (200 mg total) by mouth daily.      Multiple Vitamin (MULTIVITAMIN ADULT OR) Take 1 tablet by mouth daily.       No current facility-administered medications on file prior to visit.   [4] No Known Allergies  [5]   Family History  Problem Relation Age of Onset    Cancer Mother     Breast Cancer Mother     Alcohol and Other Disorders Associated Father         alcoholism

## 2025-07-04 RX ORDER — AMLODIPINE BESYLATE 10 MG/1
10 TABLET ORAL DAILY
Qty: 90 TABLET | Refills: 3 | Status: SHIPPED | OUTPATIENT
Start: 2025-07-04

## 2025-07-04 NOTE — TELEPHONE ENCOUNTER
Please Review. Protocol Failed; No Protocol   BP Readings from Last 3 Encounters:   05/03/25 146/65   05/03/25 134/83   04/16/25 152/75

## 2025-07-07 ENCOUNTER — OFFICE VISIT (OUTPATIENT)
Dept: INTERNAL MEDICINE CLINIC | Facility: CLINIC | Age: 75
End: 2025-07-07
Payer: MEDICARE

## 2025-07-07 VITALS
SYSTOLIC BLOOD PRESSURE: 118 MMHG | WEIGHT: 266 LBS | BODY MASS INDEX: 35.25 KG/M2 | HEIGHT: 73 IN | TEMPERATURE: 98 F | OXYGEN SATURATION: 99 % | HEART RATE: 58 BPM | DIASTOLIC BLOOD PRESSURE: 70 MMHG | RESPIRATION RATE: 20 BRPM

## 2025-07-07 DIAGNOSIS — E03.9 HYPOTHYROIDISM, UNSPECIFIED TYPE: ICD-10-CM

## 2025-07-07 DIAGNOSIS — E78.5 HYPERLIPIDEMIA, UNSPECIFIED HYPERLIPIDEMIA TYPE: ICD-10-CM

## 2025-07-07 DIAGNOSIS — M47.816 LUMBAR SPONDYLOSIS: ICD-10-CM

## 2025-07-07 DIAGNOSIS — I10 ESSENTIAL HYPERTENSION: Primary | ICD-10-CM

## 2025-07-07 DIAGNOSIS — R05.1 ACUTE COUGH: ICD-10-CM

## 2025-07-07 PROCEDURE — G2211 COMPLEX E/M VISIT ADD ON: HCPCS | Performed by: INTERNAL MEDICINE

## 2025-07-07 PROCEDURE — 99214 OFFICE O/P EST MOD 30 MIN: CPT | Performed by: INTERNAL MEDICINE

## 2025-07-07 RX ORDER — DOXYCYCLINE 100 MG/1
100 CAPSULE ORAL 2 TIMES DAILY
Qty: 14 CAPSULE | Refills: 0 | Status: SHIPPED | OUTPATIENT
Start: 2025-07-07 | End: 2025-07-14

## 2025-07-07 NOTE — PROGRESS NOTES
HPI:    Patient ID: Stanley De Santiago is a 74 year old male.    HPI    Patient returns to the office today to discuss chronic medical issues as listed on the active problem list below.  Patient last seen in the office by me on April 7 for what appeared to be infected cyst/boil on his back.  It was drained and placed on Duricef.  During the last visit, the following changes were made: Temporary course of Duricef.  Since the last visit, the patient has seen the following doctors: Walk-in clinic for cough, urology, podiatry, cardiology, physiatry.    Today, the patient offers the following complaints: the cyst on the back is not an issue. He was in [place] for 2 weeks; he got sick there with cough that persisted after return; had CXR and CT; put on antibiotics; it cleared after quite some time. Later, his wife became sick which he then caught; started with sore throat and sinuses, then cough with phlegm; the cough is better but still with chest congestion. No check for COVID. No fevers. Slowly recovering now over 3 weeks.   He is working with Podiatry. He has a wart on the bottom of his foot that bothers him.  Not checking BP.   He will be starting PT for his back soon.   Patient describes diet as not so good. Weight is up 6 pounds in the last year.   For exercise, the patient goes to the gym 2-3 times a week.   Tobacco and alcohol use reviewed.   Current medications reviewed.   Health maintenance issues reviewed.    Wt Readings from Last 6 Encounters:   07/07/25 266 lb (120.7 kg)   06/24/25 262 lb (118.8 kg)   05/03/25 261 lb (118.4 kg)   05/03/25 260 lb (117.9 kg)   04/16/25 260 lb (117.9 kg)   04/07/25 264 lb (119.7 kg)       Problem List[1]     HISTORY:  Past Medical History[2]   Past Surgical History[3]   Family History[4]   Short Social Hx on File[5]       Review of Systems        Current Medications[6]  Allergies:Allergies[7]     PHYSICAL EXAM:   /70 (BP Location: Left arm, Patient Position: Sitting,  Cuff Size: large)   Pulse 58   Temp 98 °F (36.7 °C) (Other)   Resp 20   Ht 6' 1\" (1.854 m)   Wt 266 lb (120.7 kg)   SpO2 99%   BMI 35.09 kg/m²      Physical Exam  Constitutional:       Appearance: Normal appearance. He is well-developed.   HENT:      Nose: Nose normal.      Mouth/Throat:      Pharynx: No oropharyngeal exudate or posterior oropharyngeal erythema.   Eyes:      Conjunctiva/sclera: Conjunctivae normal.   Neck:      Vascular: No carotid bruit.   Cardiovascular:      Rate and Rhythm: Normal rate and regular rhythm.      Pulses: Normal pulses.      Heart sounds: Normal heart sounds. No murmur heard.  Pulmonary:      Effort: Pulmonary effort is normal.      Breath sounds: Normal breath sounds. No wheezing or rales.   Abdominal:      General: Bowel sounds are normal.      Palpations: Abdomen is soft. There is no mass.      Tenderness: There is no abdominal tenderness.   Musculoskeletal:      Right lower leg: No edema.      Left lower leg: No edema.   Lymphadenopathy:      Cervical: No cervical adenopathy.   Skin:     General: Skin is warm and dry.      Findings: No rash.   Neurological:      General: No focal deficit present.      Mental Status: He is alert.   Psychiatric:         Mood and Affect: Mood normal.         Behavior: Behavior normal.         Thought Content: Thought content normal.                 ASSESSMENT/PLAN:   1. Essential hypertension blood pressure is well-controlled.  Continue current treatment.  Medications reviewed in detail.  Work on diet and exercise.  Try to lose weight.  Follow-up here in 6 months for Medicare annual wellness visit next January.    2. Hypothyroidism, unspecified type  Last TSH looked good.  Continue current dose of levothyroxine.    3. Hyperlipidemia, unspecified hyperlipidemia type  Stable.  Continue current medications.  Work on diet and exercise.    4. Lumbar spondylosis  Patient is going to be starting physical therapy soon.  May move onto injections at  some point.    5. Acute cough  Patient with prolonged cough recently upon return to [place].  Now he is caught something again and it seems to be persisting.  3 weeks of discolored sputum.  May be some level of bronchitis.  Placed on doxycycline.  Call if not resolving.  Lungs are clear.        Meds This Visit:  Requested Prescriptions      No prescriptions requested or ordered in this encounter       Imaging & Referrals:  None         Wilbur Martin MD        [1]   Patient Active Problem List  Diagnosis    Benign prostatic hyperplasia without lower urinary tract symptoms    Essential hypertension    Primary osteoarthritis of right knee    Calculus of kidney    Cholelithiasis    Nodular prostate with urinary obstruction    Pain in joint, pelvic region and thigh    History of renal cell carcinoma    History of atrial flutter   [2]   Past Medical History:   Arrhythmia    Calculus of kidney    Cancer (HCC)    small tumor on l kidney    Cholecystitis    Chronic atrial fibrillation (HCC)    Disorder of thyroid    Epidermal inclusion cyst    excision of cyst on back, 2013    High blood pressure    High cholesterol    Osteoarthritis    Other and unspecified hyperlipidemia    Prostate enlargement    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    Unspecified essential hypertension   [3]   Past Surgical History:  Procedure Laterality Date    Cholecystectomy      Colonoscopy  2007    Colonoscopy N/A 1/2/2018    Procedure: COLONOSCOPY;  Surgeon: Shiv Cruz MD;  Location: [facility] ENDOSCOPY    Colonoscopy N/A 2/16/2021    Procedure: COLONOSCOPY;  Surgeon: Shiv Cruz MD;  Location: [facility] ENDOSCOPY    Colonoscopy N/A 9/10/2024    Procedure: COLONOSCOPY;  Surgeon: Shiv Cruz MD;  Location: [facility] ENDOSCOPY    Kidney surgery  2012    left partial nephrectomy    Knee replacement surgery Left 2005    Right TKR 10/2015    Total knee replacement     [4]   Family History  Problem Relation Age of  Onset    Cancer Mother     Breast Cancer Mother     Alcohol and Other Disorders Associated Father         alcoholism   [5]   Social History  Socioeconomic History    Marital status:    Tobacco Use    Smoking status: Never     Passive exposure: Never    Smokeless tobacco: Never   Vaping Use    Vaping status: Never Used   Substance and Sexual Activity    Alcohol use: Yes     Alcohol/week: 3.0 standard drinks of alcohol     Types: 3 Glasses of wine per week     Comment: social -- 3-4 glasses of wine/week    Drug use: No    Sexual activity: Not Currently     Partners: Female   Other Topics Concern    Caffeine Concern Yes     Comment: coffee 2cups/day     Social Drivers of Health     Food Insecurity: No Food Insecurity (7/7/2025)    NCSS - Food Insecurity     Worried About Running Out of Food in the Last Year: No     Ran Out of Food in the Last Year: No   Transportation Needs: No Transportation Needs (7/7/2025)    NCSS - Transportation     Lack of Transportation: No   Housing Stability: Not At Risk (7/7/2025)    NCSS - Housing/Utilities     Has Housing: Yes     Worried About Losing Housing: No     Unable to Get Utilities: No   [6]   Current Outpatient Medications   Medication Sig Dispense Refill    amLODIPine 10 MG Oral Tab Take 1 tablet (10 mg total) by mouth daily. 90 tablet 3    atorvastatin 20 MG Oral Tab Take 1 tablet (20 mg total) by mouth nightly. Watch muscle weakness 90 tablet 3    levothyroxine 100 MCG Oral Tab Take 1 tablet (100 mcg total) by mouth before breakfast. 90 tablet 3    oxybutynin ER 10 MG Oral Tablet 24 Hr Take 1 tablet (10 mg total) by mouth daily. 90 tablet 3    metoprolol succinate ER 25 MG Oral Tablet 24 Hr Take 1 tablet (25 mg total) by mouth daily.      amiodarone 200 MG Oral Tab Take 1 tablet (200 mg total) by mouth daily.      Multiple Vitamin (MULTIVITAMIN ADULT OR) Take 1 tablet by mouth daily.      terbinafine 250 MG Oral Tab Take 1 tablet (250 mg total) by mouth daily. (Patient  not taking: Reported on 7/7/2025) 90 tablet 0   [7] No Known Allergies

## 2025-07-22 ENCOUNTER — OFFICE VISIT (OUTPATIENT)
Dept: PHYSICAL THERAPY | Age: 75
End: 2025-07-22
Attending: PHYSICAL MEDICINE & REHABILITATION
Payer: MEDICARE

## 2025-07-22 DIAGNOSIS — M47.816 LUMBAR SPONDYLOSIS: Primary | ICD-10-CM

## 2025-07-22 PROCEDURE — 97112 NEUROMUSCULAR REEDUCATION: CPT | Performed by: PHYSICAL THERAPIST

## 2025-07-22 PROCEDURE — 97110 THERAPEUTIC EXERCISES: CPT | Performed by: PHYSICAL THERAPIST

## 2025-07-22 PROCEDURE — 97161 PT EVAL LOW COMPLEX 20 MIN: CPT | Performed by: PHYSICAL THERAPIST

## 2025-07-22 NOTE — PROGRESS NOTES
SPINE EVALUATION:     Diagnosis:   Lumbar spondylosis (M47.816) Patient:  Stanley De Santiago (74 year old, male)        Referring Provider: Wilbur Alvarez  Today's Date   7/22/2025    Precautions:  None   Date of Evaluation: 07/22/25  Next MD visit: to be scheduled  Date of Surgery: NA     PATIENT SUMMARY     Summary of chief complaints: LBP (B) radiating almost to the sides  History of current condition: LBP x 5-6 years for no apparent cause or JULIENNE   Pain level: current 0 /10, at best 0 /10, at worst 7 /10  Description of symptoms: sharp pain across LBP which lasts for a few minutes   Occupation: Retired ; 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   Leisure activities/Hobbies: likes hiking, cross-fit   Prior level of function: (I) and active; lives with spouse with 1 flight of stairs  Current limitations: limiting weight lifting to 40lbs at a time currently but has worked out with over 100lbs; pain after cutting grass  Pt goals: pain relief and return to normal work outs and walk long distances  Red flag signs/symptoms: Pt denies dizziness, drop attacks, dysphagia, dysarthria, diplopia; Pt denies changes in bowel/bladder function, saddle anesthesia; Pt denies pain that wakes in sleep, fever, recent trauma, history of CA, pain unchanged with movement/activity    Past medical history was reviewed with Stanley.  Significant findings include: (B) TKA  Imaging/Tests: last MRI in 2024   Stanley  has a past medical history of Arrhythmia, Calculus of kidney (2010?), Cancer (HCC) (2012), Cholecystitis, Chronic atrial fibrillation (HCC), Disorder of thyroid, Epidermal inclusion cyst, High blood pressure, High cholesterol, Osteoarthritis, Other and unspecified hyperlipidemia, Prostate enlargement, Renal cell carcinoma (HCC) (2014), and Unspecified essential hypertension.  He  has a past surgical history that includes cholecystectomy; Kidney Surgery (2012); knee replacement  surgery (Left, 2005); colonoscopy (2007); colonoscopy (N/A, 1/2/2018); total knee replacement; colonoscopy (N/A, 2/16/2021); and colonoscopy (N/A, 9/10/2024).    ASSESSMENT  Stanley presents to physical therapy evaluation with primary c/o LBP (B) radiating almost to the sides. The results of the objective tests and measures show symptoms and findings consistent with a lumbar posterior derangement with mechanical directional preference towards extension along with sensitivity to load and will benefit from a lumbar extension program. Functional deficits include but are not limited to limiting weight lifting to 40lbs at a time currently but has worked out with over 100lbs; pain after cutting grass. Signs and symptoms are consistent with diagnosis of Lumbar spondylosis (M47.816). Pt and PT discussed evaluation findings, pathology, POC and HEP.  Pt voiced understanding and performs HEP correctly without reported pain. Skilled Physical Therapy is medically necessary to address the above impairments and reach functional goals.    OBJECTIVE:      Musculoskeletal:  Observation/Posture: decreased cervical lordosis; forward head posture; rounded shoulders; anterior pelvic tilt   Accessory Motion: mod loss of lumbar PA's   Palpation: muscle guarding to parathoracolumbars     Special tests:   (-) seated slump, SLR, Lasegue's; (-) lumbar quadrant     ROM and Strength:  (* denotes performed with pain)  Trunk ROM MMT (-/5)     Flex WNL 5     Ext mod loss 4    R L R L     Side bend min loss min loss 5 5     Rotation min loss min loss 5 5   ,   Hip   ROM MMT (-/5)    R L R L     Flex (L2) WNL WNL 5 5     Ext  WNL WNL 4- 4-     Abd WNL WNL 5 5     ER WNL WNL 4 4     IR WNL WNL 5 5        Flexibility:  LE Flexibility R L     Hip Flexor mod restricted mod restricted     Hamstrings mod restricted mod restricted     ITB mod restricted mod restricted     Piriformis mod restricted mod restricted     Quads mod restricted mod restricted      Gastroc-soleus mod restricted mod restricted       Neurological:  Sensation: grossly intact to light touch JACQUELINE UE/LE     Deep Tendon Reflexes: grossly intact JACQUELINE UE/LE     UMN:      Woods's: negative     Clonus: negative     Babinski: negative     Peripheral Neurodynamic: WNL except     Balance and Functional Mobility:  Gait: pt ambulates on level ground with trendelenburg/waddle.  Balance: SLS: EO R 5 sec, EO L 5 sec        Today's Treatment and Response:   Pt education was provided on exam findings, treatment diagnosis, treatment plan, expectations, and prognosis.    Today's Treatment       7/22/2025   Spine Treatment   Therapeutic Exercise DHARMESH 3x10  Prone lying x 3 mins  LOYD x 3 mins  REIL 3x10   Neuro Re-Education Posture correction and education on postural influences on tissue load/injury and impact on tissue irritability vs healing.  Wall posture drill  Lumbar roll use for sitting   Therapeutic Exercise Minutes 15   Neuro Re-Educ Minutes 8   Evaluation Minutes 20   Total Time Of Timed Procedures 23   Total Time Of Service-Based Procedures 20   Total Treatment Time 43   HEP Refer to patient instructions        Patient was instructed in and issued a HEP for: Refer to patient instructions    Charges:  PT EVAL: Low Complexity, PT eval x 1; Therapeutic exercise x 1; Neuromuscular re-education x 1  In agreement with evaluation findings and clinical rationale, this evaluation involved LOW COMPLEXITY decision making due to no personal factors/comorbidities, 1-2 body structures involved/activity limitations, and stable symptoms as documented in the evaluation.                                                                         PLAN OF CARE:      Goals: (to be met in 8 visits)    Not Met Progress Toward Partially Met Met   Pt will improve transversus abdominis recruitment to perform proper isometric contraction without requiring verbal or tactile cuing to promote advancement of therex. [] [] [] []   Pt will  demonstrate good understanding of proper posture and body mechanics to decrease pain and improve spinal safety. [] [] [] []   Pt will improve lumbar spine AROM flexion to >WNL and painfree deg to allow increase ease with bending forward to don shoes. [] [] [] []   Pt will report improved symptom centralization and absence of radicular symptoms for 3 consecutive days to improve function with ADLs. [] [] [] []   Pt will have decreased paraspinal mm tension to tolerate standing >30 minutes for work and home activities. [] [] [] []   Pt will demonstrate improved core strength to be able to perform lifting floor to waist with <1/10 pain. [] [] [] []   Pt will be independent and compliant with comprehensive HEP to maintain progress achieved in PT [] [] [] []         Frequency / Duration: Patient will be seen 1-2x/week or a total of 8  visits over a 90 day period. Treatment will include: Manual Therapy; Neuromuscular Re-education; Therapeutic Activities; Therapeutic Exercise; Home Exercise Program instruction    Education or treatment limitation: None   Rehab Potential: good     Oswestry Disability Index Score  Score: (Patient-Rptd) 8 % (7/22/2025 10:35 AM)      Patient/Family/Caregiver was advised of these findings, precautions, and treatment options and has agreed to actively participate in planning and for this course of care.    Thank you for your referral. Please co-sign or sign and return this letter via fax as soon as possible to [phone]. If you have any questions, please contact me at Dept: [phone]    Sincerely,  Electronically signed by therapist: Ernesto Hardwick, PT  Physician's certification required: Yes  I certify the need for these services furnished under this plan of treatment and while under my care.    X___________________________________________________ Date____________________    Certification From: 7/22/2025  To: 10/20/2025

## 2025-07-24 ENCOUNTER — OFFICE VISIT (OUTPATIENT)
Dept: PHYSICAL THERAPY | Age: 75
End: 2025-07-24
Attending: PHYSICAL MEDICINE & REHABILITATION
Payer: MEDICARE

## 2025-07-24 PROCEDURE — 97140 MANUAL THERAPY 1/> REGIONS: CPT | Performed by: PHYSICAL THERAPIST

## 2025-07-24 PROCEDURE — 97110 THERAPEUTIC EXERCISES: CPT | Performed by: PHYSICAL THERAPIST

## 2025-07-24 NOTE — PROGRESS NOTES
Patient: Stanley De Santiago (74 year old, male) Referring Provider:  Insurance:   Diagnosis: Lumbar spondylosis (M47.816) Wilbur Alvarez  MEDICARE   Date of Surgery: NA Next MD visit:  MELCHOR   Precautions:  None to be scheduled Referral Information:    Date of Evaluation: Req: 1, Auth: 1, Exp: 6/24/2026 07/22/25 POC Auth Visits:  8       Today's Date   7/24/2025    Subjective  Denies LBP upon arrival; some calf soreness with stretching.       Pain: 0/10     Objective  Tolerating increased end-range with unloaded extension without symptom provocaiton; improved postural awareness noted.          Assessment  Responding well to lumbar extension program and adequate flexion avoidance with good symptom control. Tolerating progression into extension bias core stabilization  Treatment today include Manual Therapy and Therapeutic Exercise focusing on lumbar extension program and noted improvement in symptom control with HEP post session. Residual deficits continue to be noted in intolerance to flexion and will be addressed with continued skilled interventions.      Goals (to be met in 8 visits)      Not Met Progress Toward Partially Met Met   Pt will improve transversus abdominis recruitment to perform proper isometric contraction without requiring verbal or tactile cuing to promote advancement of therex. [] [x] [] []   Pt will demonstrate good understanding of proper posture and body mechanics to decrease pain and improve spinal safety. [] [x] [] []   Pt will improve lumbar spine AROM flexion to >WNL and painfree deg to allow increase ease with bending forward to don shoes. [] [x] [] []   Pt will report improved symptom centralization and absence of radicular symptoms for 3 consecutive days to improve function with ADLs. [] [x] [] []   Pt will have decreased paraspinal mm tension to tolerate standing >30 minutes for work and home activities. [] [x] [] []   Pt will demonstrate improved core strength to be able to perform  lifting floor to waist with <1/10 pain. [] [x] [] []   Pt will be independent and compliant with comprehensive HEP to maintain progress achieved in PT [] [x] [] []             Plan  Continue to progress extension bias and unloaded core stabilization progressing to loading as tolerated.    Treatment Last 4 Visits  Treatment Day: 2       7/22/2025 7/24/2025   Spine Treatment   Therapeutic Exercise DHARMESH 3x10  Prone lying x 3 mins  LOYD x 3 mins  REIL 3x10 Slantboard stretch L3 (B) 3 x 1 min ea  TM 12% grade 12% grade up to 0.8mph x 10 mins focus on upright posture control  Prone heel squeeze 2x15; 2nd set with knee flexion  LOYD with femoral nerve glides 3x10  Prone hip ext 3x10  Prone gluteal raises 3x10  SL clamshells 3x10  REIL 2x10; 2nd set with HOB elevated     Neuro Re-Education Posture correction and education on postural influences on tissue load/injury and impact on tissue irritability vs healing.  Wall posture drill  Lumbar roll use for sitting    Manual Therapy  Lumbar PA's Gr 3-4   STM to paralumbars.gluteals   Therapeutic Exercise Minutes 15 40   Neuro Re-Educ Minutes 8    Manual Therapy Minutes  8   Evaluation Minutes 20    Total Time Of Timed Procedures 23 48   Total Time Of Service-Based Procedures 20 0   Total Treatment Time 43 48   HEP Refer to patient instructions Refer to patient instructions.        HEP  Refer to patient instructions.    Charges  THerapeutic exercise x 2; Manual Therapy x 1

## 2025-07-28 ENCOUNTER — OFFICE VISIT (OUTPATIENT)
Dept: PHYSICAL THERAPY | Age: 75
End: 2025-07-28
Attending: PHYSICAL MEDICINE & REHABILITATION
Payer: MEDICARE

## 2025-07-28 PROCEDURE — 97110 THERAPEUTIC EXERCISES: CPT | Performed by: PHYSICAL THERAPIST

## 2025-07-28 PROCEDURE — 97112 NEUROMUSCULAR REEDUCATION: CPT | Performed by: PHYSICAL THERAPIST

## 2025-07-28 NOTE — PROGRESS NOTES
Patient: Stanley De Santiago (74 year old, male) Referring Provider:  Insurance:   Diagnosis: Lumbar spondylosis (M47.816) Wilbur Alvarez  MEDICARE   Date of Surgery: NA Next MD visit:  MELCHOR   Precautions:  None to be scheduled Referral Information:    Date of Evaluation: Req: 1, Auth: 1, Exp: 6/24/2026 07/22/25 POC Auth Visits:  8       Today's Date   7/28/2025    Subjective  No symptoms upon arrival and only c/o       Pain: 0/10     Objective  Tolerating progression to loaded core strengthening/stabilization without symptom provocation.          Assessment  Good symptom control with extension program allowing progression of loaded activities.  Treatment today include Neuromuscular Re-education and Therapeutic Exercise focusing on lumbar extension program and noted improvement in tolerance to loaded positions post session. Residual deficits continue to be noted in intolerance to flexion and will be addressed with continued skilled interventions.      Goals (to be met in 8 visits)      Not Met Progress Toward Partially Met Met   Pt will improve transversus abdominis recruitment to perform proper isometric contraction without requiring verbal or tactile cuing to promote advancement of therex. [] [x] [] []   Pt will demonstrate good understanding of proper posture and body mechanics to decrease pain and improve spinal safety. [] [x] [] []   Pt will improve lumbar spine AROM flexion to >WNL and painfree deg to allow increase ease with bending forward to don shoes. [] [x] [] []   Pt will report improved symptom centralization and absence of radicular symptoms for 3 consecutive days to improve function with ADLs. [] [x] [] []   Pt will have decreased paraspinal mm tension to tolerate standing >30 minutes for work and home activities. [] [x] [] []   Pt will demonstrate improved core strength to be able to perform lifting floor to waist with <1/10 pain. [] [x] [] []   Pt will be independent and compliant with  comprehensive HEP to maintain progress achieved in PT [] [x] [] []                 Plan  Continue to progress core strengthening and HEP progressions in absence of latent increase in symptoms.    Treatment Last 4 Visits  Treatment Day: 3       7/22/2025 7/24/2025 7/28/2025   Spine Treatment   Therapeutic Exercise DHARMESH 3x10  Prone lying x 3 mins  LOYD x 3 mins  REIL 3x10 Slantboard stretch L3 (B) 3 x 1 min ea  TM 12% grade 12% grade up to 0.8mph x 10 mins focus on upright posture control  Prone heel squeeze 2x15; 2nd set with knee flexion  LOYD with femoral nerve glides 3x10  Prone hip ext 3x10  Prone gluteal raises 3x10  SL clamshells 3x10  REIL 2x10; 2nd set with HOB elevated   Slantboard stretch L4 (B) 3 x 1 min ea  Lat pull downs tandem stance on airex beam 10lbs multi pull 2x15 ea  TM 12% grade retrowalk 0.8mph x 10 mins  Prone alt/opp UE/LE lifts 2x10 ea  REIL with belt overpressure 2x10   Neuro Re-Education Posture correction and education on postural influences on tissue load/injury and impact on tissue irritability vs healing.  Wall posture drill  Lumbar roll use for sitting  3 way hip on BOSU 2x10  Lateral step up on BOSU with dips 2x10 ea  Pallof press 10lb cable resistance 2x15 ea   Manual Therapy  Lumbar PA's Gr 3-4   STM to paralumbars.gluteals    Therapeutic Exercise Minutes 15 40 35   Neuro Re-Educ Minutes 8  8   Manual Therapy Minutes  8    Evaluation Minutes 20     Total Time Of Timed Procedures 23 48 43   Total Time Of Service-Based Procedures 20 0 0   Total Treatment Time 43 48 43   HEP Refer to patient instructions Refer to patient instructions. Continue current HEP.        HEP  Continue current HEP.    Charges  Therapeutic exercise x 2; Neuromuscular re-education x 1

## 2025-07-30 ENCOUNTER — OFFICE VISIT (OUTPATIENT)
Dept: PHYSICAL THERAPY | Age: 75
End: 2025-07-30
Attending: PHYSICAL MEDICINE & REHABILITATION
Payer: MEDICARE

## 2025-07-30 PROCEDURE — 97110 THERAPEUTIC EXERCISES: CPT | Performed by: PHYSICAL THERAPIST

## 2025-07-30 PROCEDURE — 97112 NEUROMUSCULAR REEDUCATION: CPT | Performed by: PHYSICAL THERAPIST

## 2025-07-31 ENCOUNTER — TELEPHONE (OUTPATIENT)
Dept: INTERNAL MEDICINE CLINIC | Facility: CLINIC | Age: 75
End: 2025-07-31

## 2025-08-04 ENCOUNTER — APPOINTMENT (OUTPATIENT)
Dept: PHYSICAL THERAPY | Age: 75
End: 2025-08-04
Attending: PHYSICAL MEDICINE & REHABILITATION

## 2025-08-07 ENCOUNTER — APPOINTMENT (OUTPATIENT)
Dept: PHYSICAL THERAPY | Age: 75
End: 2025-08-07
Attending: PHYSICAL MEDICINE & REHABILITATION

## 2025-08-11 ENCOUNTER — APPOINTMENT (OUTPATIENT)
Dept: PHYSICAL THERAPY | Age: 75
End: 2025-08-11
Attending: PHYSICAL MEDICINE & REHABILITATION

## 2025-08-13 DIAGNOSIS — B35.1 ONYCHOMYCOSIS: Primary | ICD-10-CM

## 2025-08-14 ENCOUNTER — APPOINTMENT (OUTPATIENT)
Dept: PHYSICAL THERAPY | Age: 75
End: 2025-08-14
Attending: PHYSICAL MEDICINE & REHABILITATION

## 2025-08-16 ENCOUNTER — LAB ENCOUNTER (OUTPATIENT)
Dept: LAB | Facility: HOSPITAL | Age: 75
End: 2025-08-16
Attending: PODIATRIST

## 2025-08-16 DIAGNOSIS — B35.1 ONYCHOMYCOSIS: ICD-10-CM

## 2025-08-16 LAB
ALBUMIN SERPL-MCNC: 4.8 G/DL (ref 3.2–4.8)
ALP LIVER SERPL-CCNC: 110 U/L (ref 45–117)
ALT SERPL-CCNC: 21 U/L (ref 10–49)
AST SERPL-CCNC: 26 U/L (ref ?–34)
BILIRUB DIRECT SERPL-MCNC: 0.1 MG/DL (ref ?–0.3)
BILIRUB SERPL-MCNC: 0.3 MG/DL (ref 0.2–1.1)
PROT SERPL-MCNC: 7.3 G/DL (ref 5.7–8.2)

## 2025-08-16 PROCEDURE — 36415 COLL VENOUS BLD VENIPUNCTURE: CPT

## 2025-08-16 PROCEDURE — 80076 HEPATIC FUNCTION PANEL: CPT

## 2025-08-18 ENCOUNTER — APPOINTMENT (OUTPATIENT)
Dept: PHYSICAL THERAPY | Age: 75
End: 2025-08-18
Attending: PHYSICAL MEDICINE & REHABILITATION

## 2025-08-20 ENCOUNTER — OFFICE VISIT (OUTPATIENT)
Dept: PODIATRY CLINIC | Facility: CLINIC | Age: 75
End: 2025-08-20

## 2025-08-20 DIAGNOSIS — B07.0 PLANTAR VERRUCA: ICD-10-CM

## 2025-08-20 DIAGNOSIS — B35.1 ONYCHOMYCOSIS: Primary | ICD-10-CM

## 2025-08-20 DIAGNOSIS — M20.40 HAMMER TOE, ACQUIRED: ICD-10-CM

## 2025-08-21 ENCOUNTER — APPOINTMENT (OUTPATIENT)
Dept: PHYSICAL THERAPY | Age: 75
End: 2025-08-21
Attending: PHYSICAL MEDICINE & REHABILITATION

## 2025-08-25 ENCOUNTER — APPOINTMENT (OUTPATIENT)
Dept: PHYSICAL THERAPY | Age: 75
End: 2025-08-25
Attending: PHYSICAL MEDICINE & REHABILITATION

## 2025-08-27 ENCOUNTER — APPOINTMENT (OUTPATIENT)
Dept: PHYSICAL THERAPY | Age: 75
End: 2025-08-27
Attending: PHYSICAL MEDICINE & REHABILITATION

## (undated) DEVICE — SOL  .9 1000ML BAG

## (undated) DEVICE — FIBER XPS MOXY

## (undated) DEVICE — LASSO POLYPECTOMY SNARE: Brand: LASSO

## (undated) DEVICE — Device: Brand: DEFENDO AIR/WATER/SUCTION AND BIOPSY VALVE

## (undated) DEVICE — MEDI-VAC NON-CONDUCTIVE SUCTION TUBING 6MM X 1.8M (6FT.) L: Brand: CARDINAL HEALTH

## (undated) DEVICE — MEDI-VAC NON-CONDUCTIVE SUCTION TUBING: Brand: CARDINAL HEALTH

## (undated) DEVICE — SNARE CAPTIFLEX MICRO-OVL OLY

## (undated) DEVICE — KIT ENDO ORCAPOD 160/180/190

## (undated) DEVICE — CO2 CANNULA,SSOFT,ADLT,7O2,4CO2,FEMALE: Brand: MEDLINE

## (undated) DEVICE — PLASTC TOOMEY SYRNG DISP

## (undated) DEVICE — KIT MFLD FOR SPEC COLL

## (undated) DEVICE — 35 ML SYRINGE REGULAR TIP: Brand: MONOJECT

## (undated) DEVICE — IV SET ADMIN W/2 PORTS

## (undated) DEVICE — CATH SECURING DEVICE STATLOCK

## (undated) DEVICE — SOL  .9 3000ML

## (undated) DEVICE — SNARE LARIAT HEXAGONAL 10X28

## (undated) DEVICE — CATH URTH BDX IC 18FR FL 2

## (undated) DEVICE — ENDOSCOPY PACK - LOWER: Brand: MEDLINE INDUSTRIES, INC.

## (undated) DEVICE — SOL H2O 1000ML BTL

## (undated) DEVICE — Device: Brand: SPOT EX ENDOSCOPIC TATTOO

## (undated) DEVICE — SYRINGE, LUER SLIP, STERILE, 60ML: Brand: MEDLINE

## (undated) DEVICE — Device: Brand: CUSTOM PROCEDURE KIT

## (undated) DEVICE — REM POLYHESIVE ADULT PATIENT RETURN ELECTRODE: Brand: VALLEYLAB

## (undated) DEVICE — CYSTO PACK: Brand: MEDLINE INDUSTRIES, INC.

## (undated) DEVICE — STERILE LATEX POWDER-FREE SURGICAL GLOVESWITH NITRILE COATING: Brand: PROTEXIS

## (undated) DEVICE — SNARE OPTMZ PLPCTM TRP

## (undated) DEVICE — LINE MNTR ADLT SET O2 INTMD

## (undated) DEVICE — HEXAGONAL CAPTIVATOR STIFF 13M

## (undated) DEVICE — KIT CLEAN ENDOKIT 1.1OZ GOWNX2

## (undated) DEVICE — RENTAL LASER GREEN LIGHT XPS

## (undated) DEVICE — ENCORE® LATEX ACCLAIM SIZE 8, STERILE LATEX POWDER-FREE SURGICAL GLOVE: Brand: ENCORE

## (undated) NOTE — LETTER
[address], [place]  Authorization for Invasive Procedures  Date:04/25/2023           Time: 80    I hereby authorize Dr. Vijay Veronica, my physician and his/her assistants (if applicable), which may include medical students, residents, and/or fellows, to perform the following surgical operation/ procedure and administer such anesthesia as may be determined necessary by my physician: Cardioversion on Erik Och  2. I recognize that during the surgical operation/procedure, unforeseen conditions may necessitate additional or different procedures than those listed above. I, therefore, further authorize and request that the above-named surgeon, assistants, or designees perform such procedures as are, in their judgment, necessary and desirable. 3.   My surgeon/physician has discussed prior to my surgery the potential benefits, risks and side effects of this procedure; the likelihood of achieving goals; and potential problems that might occur during recuperation. They also discussed reasonable alternatives to the procedure, including risks, benefits, and side effects related to the alternatives and risks related to not receiving this procedure. I have had all my questions answered and I acknowledge that no guarantee has been made as to the result that may be obtained. 4.   Should the need arise during my operation/procedure, which includes change of level of care prior to discharge, I also consent to the administration of blood and/or blood products. Further, I understand that despite careful testing and screening of blood or blood products by collecting agencies, I may still be subject to ill effects as a result of receiving a blood transfusion and/or blood products.   The following are some, but not all, of the potential risks that can occur: fever and allergic reactions, hemolytic reactions, transmission of diseases such as Hepatitis, AIDS and Cytomegalovirus (CMV) and fluid overload. In the event that I wish to have an autologous transfusion of my own blood, or a directed donor transfusion, I will discuss this with my physician. Check only if Refusing Blood or Blood Products  I understand refusal of blood or blood products as deemed necessary by my physician may have serious consequences to my condition to include possible death. I hereby assume responsibility for my refusal and release the hospital, its personnel, and my physicians from any responsibility for the consequences of my refusal.         o  Refuse         5. I authorize the use of any specimen, organs, tissues, body parts or foreign objects that may be removed from my body during the operation/procedure for diagnosis, research or teaching purposes and their subsequent disposal by hospital authorities. I also authorize the release of specimen test results and/or written reports to my treating physician on the hospital medical staff or other referring or consulting physicians involved in my care, at the discretion of the Pathologist or my treating physician. 6.   I consent to the photographing or videotaping of the operations or procedures to be performed, including appropriate portions of my body for medical, scientific, or educational purposes, provided my identity is not revealed by the pictures or by descriptive texts accompanying them. If the procedure has been photographed/videotaped, the surgeon will obtain the original picture, image, videotape or CD. The hospital will not be responsible for storage, release or maintenance of the picture, image, tape or CD.    7.   I consent to the presence of a  or observers in the operating room as deemed necessary by my physician or their designees. 8.   I recognize that in the event my procedure results in extended X-Ray/fluoroscopy time, I may develop a skin reaction. 9.  If I have a Do Not Attempt Resuscitation (DNAR) order in place, that status will be suspended while in the operating room, procedural suite, and during the recovery period unless otherwise explicitly stated by me (or a person authorized to consent on my behalf). The surgeon or my attending physician will determine when the applicable recovery period ends for purposes of reinstating the DNAR order. 10. Patients having a sterilization procedure: I understand that if the procedure is successful the results will be permanent and it will therefore be impossible for me to inseminate, conceive, or bear children. I also understand that the procedure is intended to result in sterility, although the result has not been guaranteed. 11. I acknowledge that my physician has explained sedation/analgesia administration to me including the risk and benefits I consent to the administration of sedation/analgesia as may be necessary or desirable in the judgment of my physician. I CERTIFY THAT I HAVE READ AND FULLY UNDERSTAND THE ABOVE CONSENT TO OPERATION and/or OTHER PROCEDURE.        ____________________________________       _________________________________      ______________________________  Signature of Patient         Signature of Responsible Person        Printed Name of Responsible Person        ____________________________________      _________________________________      ______________________________       Signature of Witness          Relationship to Patient                       Date                                       Time    Patient Name: Diaz Cartwright     : 1950                 Printed: 2023      Medical Record #: [id number]                      Page 1 of 2          [place] My signature below affirms that prior to the time of the procedure; I have explained to the patient and/or his/her legal representative, the risks and benefits involved in the proposed treatment and any reasonable alternative to the proposed treatment.  I have also explained the risks and benefits involved in refusal of the proposed treatment and alternatives to the proposed treatment and have answered the patient's questions.  If I have a significant financial interest in a co-management agreement or a significant financial interest in any product or implant, or other significant relationship used in this procedure/surgery, I have disclosed this and had a discussion with my patient.     _______________________________________________________________ _____________________________  Sarai Dear of Physician)                                                                                         (Date)                                   (Time)    Patient Name: Nelma Eisenmenger     : 1950                 Printed: 2023      Medical Record #: [id number]                      Page 2 of 2

## (undated) NOTE — LETTER
12/2/2020    [address] [facility]            Dear Jared Palumbo,      Our records indicate that you are due for an appointment for a Colonoscopy in January 2021, or shortly there after, with [facility]

## (undated) NOTE — LETTER
[address] [place] [address]  Authorization for Invasive Procedures  Date: 04/25/2023           Time: 0800    I hereby authorize Dr. Oliva Santa, my physician and his/her assistants (if applicable), which may include medical students, residents, and/or fellows, to perform the following surgical operation/ procedure and administer such anesthesia as may be determined necessary by my physician: Transesophageal Echocardiogram on Arlean Spotted  2. I recognize that during the surgical operation/procedure, unforeseen conditions may necessitate additional or different procedures than those listed above. I, therefore, further authorize and request that the above-named surgeon, assistants, or designees perform such procedures as are, in their judgment, necessary and desirable. 3.   My surgeon/physician has discussed prior to my surgery the potential benefits, risks and side effects of this procedure; the likelihood of achieving goals; and potential problems that might occur during recuperation. They also discussed reasonable alternatives to the procedure, including risks, benefits, and side effects related to the alternatives and risks related to not receiving this procedure. I have had all my questions answered and I acknowledge that no guarantee has been made as to the result that may be obtained. 4.   Should the need arise during my operation/procedure, which includes change of level of care prior to discharge, I also consent to the administration of blood and/or blood products. Further, I understand that despite careful testing and screening of blood or blood products by collecting agencies, I may still be subject to ill effects as a result of receiving a blood transfusion and/or blood products.   The following are some, but not all, of the potential risks that can occur: fever and allergic reactions, hemolytic reactions, transmission of diseases such as Hepatitis, AIDS and Cytomegalovirus (CMV) and fluid overload. In the event that I wish to have an autologous transfusion of my own blood, or a directed donor transfusion, I will discuss this with my physician. Check only if Refusing Blood or Blood Products  I understand refusal of blood or blood products as deemed necessary by my physician may have serious consequences to my condition to include possible death. I hereby assume responsibility for my refusal and release the hospital, its personnel, and my physicians from any responsibility for the consequences of my refusal.         o  Refuse         5. I authorize the use of any specimen, organs, tissues, body parts or foreign objects that may be removed from my body during the operation/procedure for diagnosis, research or teaching purposes and their subsequent disposal by hospital authorities. I also authorize the release of specimen test results and/or written reports to my treating physician on the hospital medical staff or other referring or consulting physicians involved in my care, at the discretion of the Pathologist or my treating physician. 6.   I consent to the photographing or videotaping of the operations or procedures to be performed, including appropriate portions of my body for medical, scientific, or educational purposes, provided my identity is not revealed by the pictures or by descriptive texts accompanying them. If the procedure has been photographed/videotaped, the surgeon will obtain the original picture, image, videotape or CD. The hospital will not be responsible for storage, release or maintenance of the picture, image, tape or CD.    7.   I consent to the presence of a  or observers in the operating room as deemed necessary by my physician or their designees. 8.   I recognize that in the event my procedure results in extended X-Ray/fluoroscopy time, I may develop a skin reaction. 9.  If I have a Do Not Attempt Resuscitation (DNAR) order in place, that status will be suspended while in the operating room, procedural suite, and during the recovery period unless otherwise explicitly stated by me (or a person authorized to consent on my behalf). The surgeon or my attending physician will determine when the applicable recovery period ends for purposes of reinstating the DNAR order. 10. Patients having a sterilization procedure: I understand that if the procedure is successful the results will be permanent and it will therefore be impossible for me to inseminate, conceive, or bear children. I also understand that the procedure is intended to result in sterility, although the result has not been guaranteed. 11. I acknowledge that my physician has explained sedation/analgesia administration to me including the risk and benefits I consent to the administration of sedation/analgesia as may be necessary or desirable in the judgment of my physician.     I CERTIFY THAT I HAVE READ AND FULLY UNDERSTAND THE ABOVE CONSENT TO OPERATION and/or OTHER PROCEDURE.        ____________________________________       _________________________________      ______________________________  Signature of Patient         Signature of Responsible Person        Printed Name of Responsible Person        ____________________________________      _________________________________      ______________________________       Signature of Witness          Relationship to Patient                       Date                                       Time    Patient Name: Kaitlynn Aguilar     : 1950                 Printed: 2023      Medical Record #: [id number]                      Page 1 of 2          STATEMENT OF PHYSICIAN My signature below affirms that prior to the time of the procedure; I have explained to the patient and/or his/her legal representative, the risks and benefits involved in the proposed treatment and any reasonable alternative to the proposed treatment. I have also explained the risks and benefits involved in refusal of the proposed treatment and alternatives to the proposed treatment and have answered the patient's questions.  If I have a significant financial interest in a co-management agreement or a significant financial interest in any product or implant, or other significant relationship used in this procedure/surgery, I have disclosed this and had a discussion with my patient.     _______________________________________________________________ _____________________________  Efe Orf of Physician)                                                                                         (Date)                                   (Time)    Patient Name: Asya Guadalupe     : 1950                 Printed: 2023      Medical Record #: [id number]                      Page 2 of 2

## (undated) NOTE — LETTER
June 19, 2018         John Mcmanus MD  [facility] [address]      Patient: Jaime Hutton   YOB: 1950   Date of Visit: 6/19/2018       Dear Dr. Katie Almodovar MD,    I saw your patient, Jaime Hutton, on 6/19/2018.  Enc

## (undated) NOTE — LETTER
[address], [place]  Authorization for Invasive Procedures  Date: 5/26/2023           Time: 1504    I hereby authorize Dr. Milagros Rowan, my physician and his/her assistants (if applicable), which may include medical students, residents, and/or fellows, to perform the following surgical operation/ procedure and administer such anesthesia as may be determined necessary by my physician: Cardioversion/Automated Internal Cardiac Defibrillator check on [facility]  2. I recognize that during the surgical operation/procedure, unforeseen conditions may necessitate additional or different procedures than those listed above. I, therefore, further authorize and request that the above-named surgeon, assistants, or designees perform such procedures as are, in their judgment, necessary and desirable. 3.   My surgeon/physician has discussed prior to my surgery the potential benefits, risks and side effects of this procedure; the likelihood of achieving goals; and potential problems that might occur during recuperation. They also discussed reasonable alternatives to the procedure, including risks, benefits, and side effects related to the alternatives and risks related to not receiving this procedure. I have had all my questions answered and I acknowledge that no guarantee has been made as to the result that may be obtained. 4.   Should the need arise during my operation/procedure, which includes change of level of care prior to discharge, I also consent to the administration of blood and/or blood products. Further, I understand that despite careful testing and screening of blood or blood products by collecting agencies, I may still be subject to ill effects as a result of receiving a blood transfusion and/or blood products.   The following are some, but not all, of the potential risks that can occur: fever and allergic reactions, hemolytic reactions, transmission of diseases such as Hepatitis, AIDS and Cytomegalovirus (CMV) and fluid overload. In the event that I wish to have an autologous transfusion of my own blood, or a directed donor transfusion, I will discuss this with my physician. Check only if Refusing Blood or Blood Products  I understand refusal of blood or blood products as deemed necessary by my physician may have serious consequences to my condition to include possible death. I hereby assume responsibility for my refusal and release the hospital, its personnel, and my physicians from any responsibility for the consequences of my refusal.         o  Refuse         5. I authorize the use of any specimen, organs, tissues, body parts or foreign objects that may be removed from my body during the operation/procedure for diagnosis, research or teaching purposes and their subsequent disposal by hospital authorities. I also authorize the release of specimen test results and/or written reports to my treating physician on the hospital medical staff or other referring or consulting physicians involved in my care, at the discretion of the Pathologist or my treating physician. 6.   I consent to the photographing or videotaping of the operations or procedures to be performed, including appropriate portions of my body for medical, scientific, or educational purposes, provided my identity is not revealed by the pictures or by descriptive texts accompanying them. If the procedure has been photographed/videotaped, the surgeon will obtain the original picture, image, videotape or CD. The hospital will not be responsible for storage, release or maintenance of the picture, image, tape or CD.    7.   I consent to the presence of a  or observers in the operating room as deemed necessary by my physician or their designees. 8.   I recognize that in the event my procedure results in extended X-Ray/fluoroscopy time, I may develop a skin reaction. 9.  If I have a Do Not Attempt Resuscitation (DNAR) order in place, that status will be suspended while in the operating room, procedural suite, and during the recovery period unless otherwise explicitly stated by me (or a person authorized to consent on my behalf). The surgeon or my attending physician will determine when the applicable recovery period ends for purposes of reinstating the DNAR order. 10. Patients having a sterilization procedure: I understand that if the procedure is successful the results will be permanent and it will therefore be impossible for me to inseminate, conceive, or bear children. I also understand that the procedure is intended to result in sterility, although the result has not been guaranteed. 11. I acknowledge that my physician has explained sedation/analgesia administration to me including the risk and benefits I consent to the administration of sedation/analgesia as may be necessary or desirable in the judgment of my physician.     I CERTIFY THAT I HAVE READ AND FULLY UNDERSTAND THE ABOVE CONSENT TO OPERATION and/or OTHER PROCEDURE.        ____________________________________       _________________________________      ______________________________  Signature of Patient         Signature of Responsible Person        Printed Name of Responsible Person        ____________________________________      _________________________________      ______________________________       Signature of Witness          Relationship to Patient                       Date                                       Time    Patient Name: Kike Stone     : 1950                 Printed: May 26, 2023      Medical Record #: [id number]                      Page 1 of 2          [place] My signature below affirms that prior to the time of the procedure; I have explained to the patient and/or his/her legal representative, the risks and benefits involved in the proposed treatment and any reasonable alternative to the proposed treatment. I have also explained the risks and benefits involved in refusal of the proposed treatment and alternatives to the proposed treatment and have answered the patient's questions.  If I have a significant financial interest in a co-management agreement or a significant financial interest in any product or implant, or other significant relationship used in this procedure/surgery, I have disclosed this and had a discussion with my patient.     _______________________________________________________________ _____________________________  Aristeo Ivan of Physician)                                                                                         (Date)                                   (Time)    Patient Name: Rip Jimenez     : 1950                 Printed: May 26, 2023      Medical Record #: [id number]                      Page 2 of 2

## (undated) NOTE — LETTER
1/17/2018              [facility]         Dear  Jerome Tishs,    Your recent colonoscopy exam at [facility] on January 2nd 2018 showed 4 small colon polyps which I removed.  3 of the polyp

## (undated) NOTE — LETTER
[facility]  Administration of Anesthesia  I, Stanley De Santiago agree to be cared for by a physician anesthesiologist alone and/or with a nurse anesthetist, who is specially trained to monitor me and give me medicine to put me to sleep or keep me comfortable during my procedure    I understand that my anesthesiologist and/or anesthetist is not an employee or agent of [facility] or [employer]. He or she works for [facility]    As the patient asking for anesthesia services, I agree to:  Allow the anesthesiologist (anesthesia doctor) to give me medicine and do additional procedures as necessary. Some examples are: Starting or using an “IV” to give me medicine, fluids or blood during my procedure, and having a breathing tube placed to help me breathe when I’m asleep (intubation). In the event that my heart stops working properly, I understand that my anesthesiologist will make every effort to sustain my life, unless otherwise directed by [facility] Do Not Resuscitate documents.  Tell my anesthesia doctor before my procedure:  If I am pregnant.  The last time that I ate or drank.  iii. All of the medicines I take (including prescriptions, herbal supplements, and pills I can buy without a prescription (including street drugs/illegal medications). Failure to inform my anesthesiologist about these medicines may increase my risk of anesthetic complications.  iv.If I am allergic to anything or have had a reaction to anesthesia before.  I understand how the anesthesia medicine will help me (benefits).  I understand that with any type of anesthesia medicine there are risks:  The most common risks are: nausea, vomiting, sore throat, muscle soreness, damage to my eyes, mouth, or teeth (from breathing tube placement).  Rare risks include: remembering what happened during my procedure, allergic reactions to medications, injury to my airway, heart, lungs, vision, nerves, or  muscles and in extremely rare instances death.  My doctor has explained to me other choices available to me for my care (alternatives).  Pregnant Patients (“epidural”):  I understand that the risks of having an epidural (medicine given into my back to help control pain during labor), include itching, low blood pressure, difficulty urinating, headache or slowing of the baby’s heart. Very rare risks include infection, bleeding, seizure, irregular heart rhythms and nerve injury.  Regional Anesthesia (“spinal”, “epidural”, & “nerve blocks”):  I understand that rare but potential complications include headache, bleeding, infection, seizure, irregular heart rhythms, and nerve injury.    _____________________________________________________________________________  Patient (or Representative) Signature/Relationship to Patient  Date   Time    _____________________________________________________________________________   Name (if used)    Language/Organization   Time    _____________________________________________________________________________  Nurse Anesthetist Signature     Date   Time  _____________________________________________________________________________  Anesthesiologist Signature     Date   Time  I have discussed the procedure and information above with the patient (or patient’s representative) and answered their questions. The patient or their representative has agreed to have anesthesia services.    _____________________________________________________________________________  Witness        Date   Time  I have verified that the signature is that of the patient or patient’s representative, and that it was signed before the procedure  Patient Name: Stanley De Santiago     : 1950                 Printed: 2024 at 8:59 AM    Medical Record #: [id number]                                            Page 1 of 1  ----------ANESTHESIA CONSENT----------

## (undated) NOTE — LETTER
[facility]  [address], [place], [place]    Authorization for Surgical Operation and Procedure                               I hereby authorize Shiv Cruz MD, my physician and his/her assistants (if applicable), which may include medical students, residents, and/or fellows, to perform the following surgical operation/ procedure and administer such anesthesia as may be determined necessary by my physician: Operation/Procedure name (s) COLONOSCOPY on Stanley De Santiago   2.   I recognize that during the surgical operation/procedure, unforeseen conditions may necessitate additional or different procedures than those listed above.  I, therefore, further authorize and request that the above-named surgeon, assistants, or designees perform such procedures as are, in their judgment, necessary and desirable.    3.   My surgeon/physician has discussed prior to my surgery the potential benefits, risks and side effects of this procedure; the likelihood of achieving goals; and potential problems that might occur during recuperation.  They also discussed reasonable alternatives to the procedure, including risks, benefits, and side effects related to the alternatives and risks related to not receiving this procedure.  I have had all my questions answered and I acknowledge that no guarantee has been made as to the result that may be obtained.    4.   Should the need arise during my operation/procedure, which includes change of level of care prior to discharge, I also consent to the administration of blood and/or blood products.  Further, I understand that despite careful testing and screening of blood or blood products by collecting agencies, I may still be subject to ill effects as a result of receiving a blood transfusion and/or blood products.  The following are some, but not all, of the potential risks that can occur: fever and allergic reactions, hemolytic reactions, transmission of  diseases such as Hepatitis, AIDS and Cytomegalovirus (CMV) and fluid overload.  In the event that I wish to have an autologous transfusion of my own blood, or a directed donor transfusion, I will discuss this with my physician.  Check only if Refusing Blood or Blood Products  I understand refusal of blood or blood products as deemed necessary by my physician may have serious consequences to my condition to include possible death. I hereby assume responsibility for my refusal and release the hospital, its personnel, and my physicians from any responsibility for the consequences of my refusal.    o  Refuse   5.   I authorize the use of any specimen, organs, tissues, body parts or foreign objects that may be removed from my body during the operation/procedure for diagnosis, research or teaching purposes and their subsequent disposal by hospital authorities.  I also authorize the release of specimen test results and/or written reports to my treating physician on the hospital medical staff or other referring or consulting physicians involved in my care, at the discretion of the Pathologist or my treating physician.    6.   I consent to the photographing or videotaping of the operations or procedures to be performed, including appropriate portions of my body for medical, scientific, or educational purposes, provided my identity is not revealed by the pictures or by descriptive texts accompanying them.  If the procedure has been photographed/videotaped, the surgeon will obtain the original picture, image, videotape or CD.  The hospital will not be responsible for storage, release or maintenance of the picture, image, tape or CD.    7.   I consent to the presence of a  or observers in the operating room as deemed necessary by my physician or their designees.    8.   I recognize that in the event my procedure results in extended X-Ray/fluoroscopy time, I may develop a skin reaction.    9. If I have a Do Not  Attempt Resuscitation (DNAR) order in place, that status will be suspended while in the operating room, procedural suite, and during the recovery period unless otherwise explicitly stated by me (or a person authorized to consent on my behalf). The surgeon or my attending physician will determine when the applicable recovery period ends for purposes of reinstating the DNAR order.  10. Patients having a sterilization procedure: I understand that if the procedure is successful the results will be permanent and it will therefore be impossible for me to inseminate, conceive, or bear children.  I also understand that the procedure is intended to result in sterility, although the result has not been guaranteed.   11. I acknowledge that my physician has explained sedation/analgesia administration to me including the risk and benefits I consent to the administration of sedation/analgesia as may be necessary or desirable in the judgment of my physician.    I CERTIFY THAT I HAVE READ AND FULLY UNDERSTAND THE ABOVE CONSENT TO OPERATION and/or OTHER PROCEDURE.     ____________________________________  _________________________________        ______________________________  Signature of Patient    Signature of Responsible Person                Printed Name of Responsible Person                                      ____________________________________  _____________________________                ________________________________  Signature of Witness        Date  Time         Relationship to Patient    STATEMENT OF PHYSICIAN My signature below affirms that prior to the time of the procedure; I have explained to the patient and/or his/her legal representative, the risks and benefits involved in the proposed treatment and any reasonable alternative to the proposed treatment. I have also explained the risks and benefits involved in refusal of the proposed treatment and alternatives to the proposed treatment and have answered the  patient's questions. If I have a significant financial interest in a co-management agreement or a significant financial interest in any product or implant, or other significant relationship used in this procedure/surgery, I have disclosed this and had a discussion with my patient.     _____________________________________________________              _____________________________  (Signature of Physician)                                                                                         (Date)                                   (Time)  Patient Name: Stanley Marsh Jonnathan      : 1950      Printed: 2024     Medical Record #: [id number]                                      Page 1 of 1

## (undated) NOTE — MR AVS SNAPSHOT
[address]  [phone]               Thank you for choosing us for your health care visit with Rizwan Harding.  MD Miguel.  We are glad to serve you and happy to provide you with this summar What changed:  Another medication with the same name was removed. Continue taking this medication, and follow the directions you see here.    Commonly known as:  PROSCAR           hydrochlorothiazide 12.5 MG Tabs   TAKE 1 TABLET DAILY   Commonly known as: [employer] [address] [place], [place] [address] for making healthy food choices  -   Enjoy your food, but eat less. Fully enjoy your food when eating. Don’t eat while distracted and slow down. Avoid over sized portions. Don’t eat while when you’re bored.

## (undated) NOTE — LETTER
11/20/2023    Stanley De Santiago        [address]        [facility] [address]            Dear Stanley De Santiago,      Our records indicate that you are due for an appointment for a Colonoscopy in early 2024 with Shiv Cruz MD. Our doctors are booking out about 3-6 months in advance for procedures.     Please call our office to schedule a phone screening appointment to plan for the procedure(s).   Your medical well-being is important to us.    If your insurance requires a referral, please call your primary care office to request one.      Thank you,      The Physicians and Staff at [facility]

## (undated) NOTE — Clinical Note
Spoke with pt today--confirms TCM/HFU appt 5/31/2023 and cardiology f/u appt 6/14/2023, as scheduled--thank you.   Future Appointments 5/31/2023  10:20 AM   Dinesh Aguirre MD        [facility] 8/23/2023  2:40 PM    Dinesh Aguirre MD        [facility] 5/6/2024   9:20 AM    [place]remont Ganser, MD         [facility]